# Patient Record
Sex: FEMALE | Race: WHITE
[De-identification: names, ages, dates, MRNs, and addresses within clinical notes are randomized per-mention and may not be internally consistent; named-entity substitution may affect disease eponyms.]

---

## 2017-01-11 ENCOUNTER — HOSPITAL ENCOUNTER (EMERGENCY)
Dept: HOSPITAL 53 - M ED | Age: 27
Discharge: HOME | End: 2017-01-11
Payer: COMMERCIAL

## 2017-01-11 DIAGNOSIS — R05: ICD-10-CM

## 2017-01-11 DIAGNOSIS — Z72.0: ICD-10-CM

## 2017-01-11 DIAGNOSIS — J18.9: ICD-10-CM

## 2017-01-11 DIAGNOSIS — R07.9: Primary | ICD-10-CM

## 2017-01-11 DIAGNOSIS — R20.9: ICD-10-CM

## 2017-01-11 NOTE — REP
Clinical:  Chest pain .

 

Comparison:  None .

 

Findings:

The mediastinum and cardiac silhouette are stable and within normal limits for

portable technique.  The lung fields are clear without acute consolidation,

effusion, or pneumothorax.  Skeletal structures are intact.

 

Impression:

Normal portable chest x-ray

 

 

Signed by

Ramy Charles MD 01/11/2017 08:16 A

## 2017-01-11 NOTE — EDDOCDS
Nurse's Notes                                                                                     

[facility]                                                                         

Name: Cee Randhawa                                                                            

Age: 26 yrs                                                                                       

Sex: Female                                                                                       

: 1990                                                                                   

MRN: [id number]                                                                                     

Arrival Date: 2017                                                                          

Time: 05:24                                                                                       

Account#: [id number]                                                                              

Bed 7                                                                                             

Private MD:                                                                                       

Diagnosis: Acute bronchitis;Pneumonia in diseases classified elsewhere                            

                                                                                                  

Presentation:                                                                                     

                                                                                             

05:35 Presenting complaint: Patient states: throat hurts, chest pains along with coughing,    cjh 

      started about two days ago. Aspirin was not taken prior to arrival. Adult Sepsis            

      Screening: The patient does not have new or worsening altered mentation. Patient's          

      respiratory rate is less than 22. Systolic blood pressure is greater than 100. Patient      

      has a qSOFA score of 0- Negative Sepsis Screen. Suicide/Homicide risk assessment- the       

      patient denies having any suicidal and/or homicidal ideations and does not present with     

      any other emotional, behavioral or mental health complaints.  Status: Patient       

      is not a  or  dependent. Transition of care: patient was not          

      received from another setting of care.                                                      

05:35 Acuity: KIARA Level 4                                                                     [facility] 

05:35 Method Of Arrival: Walkin/Carried/Asstd                                                 [facility] 

                                                                                                  

Triage Assessment:                                                                                

05:36 General: Appears in no apparent distress, comfortable, Behavior is cooperative. Pain:   [facility] 

      Location: head, neck and chest Pain currently is 7 out of 10 on a pain scale. HIV           

      screening NA for this visit Offered previously. Respiratory: Reports cough that is          

      productive. Derm: Skin is pink, warm & dry.                                               

                                                                                                  

OB/GYN:                                                                                           

05:32 LMP 2016                                                                          [facility] 

                                                                                                  

Historical:                                                                                       

- Allergies: no known allergies;                                                                  

- Home Meds:                                                                                      

1. none                                                                                         

- PMHx: none;                                                                                     

- PSHx: tubal pregnancy;                                                                          

- Social history: Smoking status: Patient uses tobacco products, light tobacco smoker.            

No barriers to communication noted.                                                             

- : The pt / caregiver states he / she is not on anticoagulants. Home medication list             

is obtained from the patient.                                                                   

- Exposure Risk Screening:: None identified.                                                      

                                                                                                  

                                                                                                  

Screenin:45 Screening information is obtained from the patient. Fall risk: No risks identified.     kas2

      Assistance ADL's: requires no assistance with activities of daily living. Abuse/DV          

      Screen: The patient / caregiver reports he/she is: not in a situation that causes fear,     

      pain or injury. Nutritional screening: No deficits noted. Advance Directives:               

      Currently, there is no health care proxy. There is no active DNR order. There is no         

      living will. There is no Power of . home support is adequate.                       

                                                                                                  

Assessment:                                                                                       

05:43 General: Appears in no apparent distress, uncomfortable, well nourished, well groomed,  kas2

      Behavior is appropriate for age, cooperative. Pain: Location: chest and neck and head       

      Pain currently is 9 out of 10 on a pain scale. Neurological: Level of Consciousness is      

      awake, alert, Oriented to person, place, time. Cardiovascular: Capillary refill < 3         

      seconds Heart tones S1 S2 present Rhythm is sinus tachycardia No ectopy. Respiratory:       

      Airway is patent Respiratory effort is even, unlabored, Respiratory pattern is regular,     

      symmetrical, Breath sounds are clear bilaterally. Derm: Skin is intact, Skin is dry,        

      Skin is pink, warm & dry. Skin temperature is warm.                                       

06:50 General: Patient laying in bed waiting for respiratory treatment before discharge. No   kas2

      apparent distress noted. Call bell within reach..                                           

                                                                                                  

Vital Signs:                                                                                      

05:32  / 95; Pulse 111; Resp 18; Temp 98.1; Pulse Ox 98% ; Weight 86.18 kg; Height 5    [facility] 

      ft. 4 in. (162.56 cm); Pain 7/10;                                                           

06:54  / 90; Pulse 99; Resp 18; Temp 97.9(O); Pulse Ox 99% on R/A; Pain 5/10;           kas2

07:46  / 86; Pulse 100; Resp 18; Temp 97.8; Pulse Ox 95% on R/A;                        jmk 

05:32 Body Mass Index 32.61 (86.18 kg, 162.56 cm)                                             [facility] 

                                                                                                  

Vitals:                                                                                           

05:32 Log In Time: 2017 at 05:21.                                                 [facility] 

                                                                                                  

ED Course:                                                                                        

05:25 Patient visited by Phyllis Nunez.                                                      gjb 

05:25 Patient moved to Waiting                                                                gjb 

05:36 Triage Initiated                                                                        [facility] 

05:37 Ursula James RN is Primary Nurse.                                                          [facility] 

05:37 Patient moved to 7                                                                      [facility] 

05:40 Patient visited by Ursula James RN.                                                        kas2

05:42 Jannette Gaspar MD is Attending Physician.                                               fg  

05:42 Patient visited by Jannette Gaspar MD.                                                   fg  

05:46 Patient visited by Ursula James RN.                                                        kas2

05:53 Patient visited by Clyde, Oliva, PCA.                                                 ls3 

05:53 EKG done. (by ED staff). Reviewed by Jannette Gaspar MD.                                   ls3 

05:54 Patient visited by Ursula James RN.                                                        kas2

06:09 [facility] Payment Agreement was scanned into [employer] and attached to record.               hs2 

06:37 [facility] is Referral Physician.                               fg  

06:51 Patient visited by Ursula James RN.                                                        kas2

06:55 Patient visited by Ursula James RN.                                                        kas2

07:19 Primary Nurse role handed off by Ursula James RN                                           mcp 

07:46 The patient / caregiver is instructed regarding the plan of care and ED course.         k 

07:46 No IV's were initiated during this patient's visit. No procedures done that require     k 

      assistance.                                                                                 

                                                                                                  

Administered Medications:                                                                         

06:01 Drug: Albuterol-Ipratropium 3 ml [ipratropium-albuterol 0.5 mg-3 mg(2.5 mg base)/3 mL   jh6 

      nebulization soln (3 mL)] Route: Inhalation;                                                

06:54 Drug: Albuterol 2.5 mg [albuterol sulfate 2.5 mg/0.5 mL solution for nebulization (0.5  sd7 

      mL)] Route: Nebulizer;                                                                      

06:58 Follow up: Response: Nebulizer completed                                                sd7 

                                                                                                  

                                                                                                  

RT:                                                                                               

06:01 Initial Med Neb Given as ordered Patient was instructed and evaluated on procedure      jh6 

      Patient tolerated procedure well without adverse effect. Respiratory: Airway is patent      

      Respiratory effort is even, unlabored, Respiratory pattern is regular symmetrical,          

      Breath sounds are coarse in right upper lobe, left upper lobe, right middle lobe, left      

      lower lobe and right lower lobe Breath sounds are diminished in right upper lobe, left      

      upper lobe, right middle lobe, left lower lobe and right lower lobe.                        

06:08 Respiratory: Airway is patent Respiratory effort is even, unlabored, Respiratory        jh6 

      pattern is regular symmetrical, Breath sounds with crackles in right upper lobe, left       

      upper lobe, right middle lobe, left lower lobe and right lower lobe Breath sounds are       

      diminished in right upper lobe, left upper lobe, right middle lobe, left lower lobe and     

      right lower lobe Reports cough that is.                                                     

06:54 Subsequent Med Neb Given as ordered Patient was reinforced on procedure Patient         sd7 

      tolerated procedure well without adverse effect. Respiratory: Breath sounds are coarse      

      bilaterally. Breath sounds with wheezes bilaterally. at inspiration.                        

                                                                                                  

Order Results:                                                                                    

There are currently no results for this order.                                                    

Outcome:                                                                                          

06:37 Discharge ordered by Provider.                                                          fg  

07:46 Discharge Assessment: Patient awake, alert and oriented x 3. No cognitive and/or        jmk 

      functional deficits noted. Patient verbalized understanding of disposition                  

      instructions. patient administered narcotics - no. The following High Risk Discharge        

      criteria are identified: None. Condition: good. Discharge instructions given to             

      patient, Instructed on discharge instructions, follow up and referral plans. medication     

      usage, Demonstrated understanding of instructions, medications, Pt was receptive of         

      discharge instructions/ teaching. Prescriptions given X 3. No special radiology studies     

      were completed. Property :Personal belongings accompany Pt.                                 

07:48 Patient left the ED.                                                                    [facility] 

                                                                                                  

Signatures:                                                                                       

Errol Mccain,RN                           RN   Antonietta Bueno RN                        RN   Lonnie Angel                                 jh6                                                  

Nina Kent,RN                          RN   Terra Del Rio,RT                   RT   sd7                                                  

Jannette Gaspar MD MD fg Schiff, Lauren, PCA                     PCA  ls3                                                  

Phyllis Nunez Hillary, Reg                   Reg  hs2                                                  

Ursula James,RN                            RN   kas2                                                 

                                                                                                  

**************************************************************************************************

MTDD

## 2017-01-11 NOTE — EDDOCDS
Physician Documentation                                                                           

[facility]                                                                         

Name: Cee Randhawa                                                                            

Age: 26 yrs                                                                                       

Sex: Female                                                                                       

: 1990                                                                                   

MRN: [id number]                                                                                     

Arrival Date: 2017                                                                          

Time: 05:24                                                                                       

Account#: [id number]                                                                              

Bed 7                                                                                             

Private MD:                                                                                       

Disposition:                                                                                      

17 06:37 Discharged to Home/Self Care. Impression: Acute bronchitis, Pneumonia in           

diseases classified elsewhere.                                                                  

- Condition is Stable.                                                                            

- Discharge Instructions: Acute Bronchitis, Pneumonia, Adult, Easy-to-Read.                       

- Prescriptions for Zithromax Z- Jac 250 mg Oral Tablet - take 1 tablet by ORAL route             

as directed for 5 days Day 1- take two tablets once. Day 2, 3, 4 , 5 take one tablet            

once daily.; 6 tablet. Prednisone 20 mg Oral Tablet - take 2 tablet by ORAL route               

once daily for 5 days; 10 tablet. benzonatate 200 mg Oral Capsule - take 1 capsule by           

ORAL route 2 times per day As needed; 15 capsule.                                               

- Medication Reconciliation, Local Pharmacy Hours form.                                           

- [facility]; When: Call to arrange an appointment;            

Reason: Continuance of care.                                                                    

- Problem is new.                                                                                 

- Symptoms have worsened.                                                                         

                                                                                                  

                                                                                                  

                                                                                                  

Historical:                                                                                       

- Allergies: no known allergies;                                                                  

- Home Meds:                                                                                      

1. none                                                                                         

- PMHx: none;                                                                                     

- PSHx: tubal pregnancy;                                                                          

- Social history: Smoking status: Patient uses tobacco products, light tobacco smoker.            

No barriers to communication noted.                                                             

- : The pt / caregiver states he / she is not on anticoagulants. Home medication list             

is obtained from the patient.                                                                   

- Exposure Risk Screening:: None identified.                                                      

                                                                                                  

                                                                                                  

OB/GYN:                                                                                           

                                                                                             

05:32 LMP 2016                                                                          [facility] 

                                                                                                  

Vital Signs:                                                                                      

05:32  / 95; Pulse 111; Resp 18; Temp 98.1; Pulse Ox 98% ; Weight 86.18 kg / 189.99     [facility] 

      lbs; Height 5 ft. 4 in. (162.56 cm); Pain 7/10;                                             

06:54  / 90; Pulse 99; Resp 18; Temp 97.9(O); Pulse Ox 99% on R/A; Pain 5/10;           kas2

07:46  / 86; Pulse 100; Resp 18; Temp 97.8; Pulse Ox 95% on R/A;                        k 

05:32 Body Mass Index 32.61 (86.18 kg, 162.56 cm)                                             [facility] 

                                                                                                  

MDM:                                                                                              

05:42 ECG WITH READING ER PHYS+CARDIAG ordered.                                               EDMS

05:48 Albuterol-Ipratropium 3 ml Inhalation once ordered.                                     fg  

05:48 Call Respiratory ordered.                                                               fg  

05:48 Strep Screen, Nursing ordered.                                                          fg  

05:49 Call Respiratory complete.                                                              kas2

05:50 Chest, 1 View Ordered.                                                                  EDMS

06:09 Financial registration complete.                                                        hs2 

06:09 [facility] Payment Agreement was scanned into [employer] and attached to record.               hs2 

06:36 Albuterol 2.5 mg Nebulizer once ordered.                                                fg  

                                                                                                  

Administered Medications:                                                                         

06:01 Drug: Albuterol-Ipratropium 3 ml [ipratropium-albuterol 0.5 mg-3 mg(2.5 mg base)/3 mL   [facility] 

      nebulization soln (3 mL)] Route: Inhalation;                                                

06:54 Drug: Albuterol 2.5 mg [albuterol sulfate 2.5 mg/0.5 mL solution for nebulization (0.5  sd7 

      mL)] Route: Nebulizer;                                                                      

06:58 Follow up: Response: Nebulizer completed                                                sd7 

                                                                                                  

                                                                                                  

Signatures:                                                                                       

Dispatcher [employer]                           EDMS                                                 

Errol Mccain,RN                           RN   Nina Vyas,RN                          RN   [facility]                                                  

Jannette Gaspar MD MD                                                      

Iram Patel, Reg                   Reg  hs2                                                  

Ursula James,RN                            RN   marry                                                 

Lonnie Arteaga                                jh6                                                  

Terra Mehta                     RT   sd7                                                  

                                                                                                  

The chart was reviewed and I authenticate all verbal orders and agree with the evaluation and 
treatment provided.Attachments:

06:09 [facility] Payment Agreement                                                                hs2 

                                                                                                  

**************************************************************************************************

MTDD

## 2017-01-11 NOTE — ECGEPIP
Stationary ECG Study

                           [facility] - ED

                                       

                                       Test Date:    2017

Pat Name:     ISAIAH RUSSELL         Department:   

Patient ID:   [id number]                 Room:         -

Gender:       F                        Technician:   

:          1990               Requested By: ABHIJIT MANCERA

Order Number: [id number]     Reading MD:   Keiko Ferrer

                                 Measurements

Intervals                              Axis          

Rate:         102                      P:            27

IN:           114                      QRS:          54

QRSD:         76                       T:            16

QT:           313                                    

QTc:          409                                    

                           Interpretive Statements

SINUS TACHYCARDIA WITH SHORT IN INTERVAL

ABNORMAL RHYTHM ECG

NO PRIOR FOR COMPARISON

Electronically Signed On 2017 18:05:26 EST by Keiko Ferrer

## 2017-01-13 NOTE — EDDOCDS
Physician Documentation                                                                           

[facility]                                                                         

Name: Cee Randhawa                                                                            

Age: 26 yrs                                                                                       

Sex: Female                                                                                       

: 1990                                                                                   

MRN: [id number]                                                                                     

Arrival Date: 2017                                                                          

Time: 05:24                                                                                       

Account#: [id number]                                                                              

Bed 7                                                                                             

Private MD:                                                                                       

Disposition:                                                                                      

17 06:37 Discharged to Home/Self Care. Impression: Acute bronchitis, Pneumonia in           

diseases classified elsewhere.                                                                  

- Condition is Stable.                                                                            

- Discharge Instructions: Acute Bronchitis, Pneumonia, Adult, Easy-to-Read.                       

- Prescriptions for Zithromax Z- Jac 250 mg Oral Tablet - take 1 tablet by ORAL route             

as directed for 5 days Day 1- take two tablets once. Day 2, 3, 4 , 5 take one tablet            

once daily.; 6 tablet. Prednisone 20 mg Oral Tablet - take 2 tablet by ORAL route               

once daily for 5 days; 10 tablet. benzonatate 200 mg Oral Capsule - take 1 capsule by           

ORAL route 2 times per day As needed; 15 capsule.                                               

- Medication Reconciliation, Local Pharmacy Hours form.                                           

- [facility]; When: Call to arrange an appointment;            

Reason: Continuance of care.                                                                    

- Problem is new.                                                                                 

- Symptoms have worsened.                                                                         

                                                                                                  

                                                                                                  

                                                                                                  

Historical:                                                                                       

- Allergies: no known allergies;                                                                  

- Home Meds:                                                                                      

1. none                                                                                         

- PMHx: none;                                                                                     

- PSHx: tubal pregnancy;                                                                          

- Social history: Smoking status: Patient uses tobacco products, light tobacco smoker.            

No barriers to communication noted.                                                             

- : The pt / caregiver states he / she is not on anticoagulants. Home medication list             

is obtained from the patient.                                                                   

- Exposure Risk Screening:: None identified.                                                      

                                                                                                  

                                                                                                  

OB/GYN:                                                                                           

                                                                                             

05:32 LMP 2016                                                                          [facility] 

                                                                                                  

Vital Signs:                                                                                      

05:32  / 95; Pulse 111; Resp 18; Temp 98.1; Pulse Ox 98% ; Weight 86.18 kg / 189.99     [facility] 

      lbs; Height 5 ft. 4 in. (162.56 cm); Pain 7/10;                                             

06:54  / 90; Pulse 99; Resp 18; Temp 97.9(O); Pulse Ox 99% on R/A; Pain 5/10;           kas2

07:46  / 86; Pulse 100; Resp 18; Temp 97.8; Pulse Ox 95% on R/A;                        k 

05:32 Body Mass Index 32.61 (86.18 kg, 162.56 cm)                                             [facility] 

                                                                                                  

MDM:                                                                                              

05:42 ECG WITH READING ER PHYS+CARDIAG ordered.                                               EDMS

05:48 Albuterol-Ipratropium 3 ml Inhalation once ordered.                                     fg  

05:48 Call Respiratory ordered.                                                               fg  

05:48 Strep Screen, Nursing ordered.                                                          fg  

05:49 Call Respiratory complete.                                                              kas2

05:50 Chest, 1 View Ordered.                                                                  EDMS

06:09 Financial registration complete.                                                        hs2 

06:09 [facility] Payment Agreement was scanned into [employer] and attached to record.               hs2 

06:36 Albuterol 2.5 mg Nebulizer once ordered.                                                fg  

13:58 T-Sheet-- Draft Copy was scanned into [employer] and attached to record.                   gb  

13:58 ECG/EKG was scanned into [employer] and attached to record.                                gb  

                                                                                                  

Administered Medications:                                                                         

06:01 Drug: Albuterol-Ipratropium 3 ml [ipratropium-albuterol 0.5 mg-3 mg(2.5 mg base)/3 mL   [facility] 

      nebulization soln (3 mL)] Route: Inhalation;                                                

06:54 Drug: Albuterol 2.5 mg [albuterol sulfate 2.5 mg/0.5 mL solution for nebulization (0.5  sd7 

      mL)] Route: Nebulizer;                                                                      

06:58 Follow up: Response: Nebulizer completed                                                sd7 

                                                                                                  

                                                                                                  

Signatures:                                                                                       

Dispatcher [employer]                           EDMS                                                 

Errol Mccain,RN                           RN   Stephanie Garza, Reg                  Reg  gb                                                   

Nina Kent,RN                          RN   [facility]                                                  

Jannette Gaspar MD MD                                                      

Iram Patel, Reg                   Reg  hs2                                                  

Ursula James RN                            RN   [facility]                                                 

Lonnie Arteaga                                jh                                                  

Terra Mehta                     RT   sd7                                                  

                                                                                                  

The chart was reviewed and I authenticate all verbal orders and agree with the evaluation and 
treatment provided.Attachments:

06:09 [facility] Payment Agreement                                                                hs2 

13:58 T-Sheet-- Draft Copy                                                                    gb  

13:58 ECG/EKG                                                                                 gb  

                                                                                                  

**************************************************************************************************



*** Chart Complete ***
MTDD

## 2017-01-13 NOTE — EDDOCDS
Nurse's Notes                                                                                     

[facility]                                                                         

Name: Cee Randhawa                                                                            

Age: 26 yrs                                                                                       

Sex: Female                                                                                       

: 1990                                                                                   

MRN: [id number]                                                                                     

Arrival Date: 2017                                                                          

Time: 05:24                                                                                       

Account#: [id number]                                                                              

Bed 7                                                                                             

Private MD:                                                                                       

Diagnosis: Acute bronchitis;Pneumonia in diseases classified elsewhere                            

                                                                                                  

Presentation:                                                                                     

                                                                                             

05:35 Presenting complaint: Patient states: throat hurts, chest pains along with coughing,    cjh 

      started about two days ago. Aspirin was not taken prior to arrival. Adult Sepsis            

      Screening: The patient does not have new or worsening altered mentation. Patient's          

      respiratory rate is less than 22. Systolic blood pressure is greater than 100. Patient      

      has a qSOFA score of 0- Negative Sepsis Screen. Suicide/Homicide risk assessment- the       

      patient denies having any suicidal and/or homicidal ideations and does not present with     

      any other emotional, behavioral or mental health complaints.  Status: Patient       

      is not a  or  dependent. Transition of care: patient was not          

      received from another setting of care.                                                      

05:35 Acuity: KIARA Level 4                                                                     [facility] 

05:35 Method Of Arrival: Walkin/Carried/Asstd                                                 [facility] 

                                                                                                  

Triage Assessment:                                                                                

05:36 General: Appears in no apparent distress, comfortable, Behavior is cooperative. Pain:   [facility] 

      Location: head, neck and chest Pain currently is 7 out of 10 on a pain scale. HIV           

      screening NA for this visit Offered previously. Respiratory: Reports cough that is          

      productive. Derm: Skin is pink, warm & dry.                                               

                                                                                                  

OB/GYN:                                                                                           

05:32 LMP 2016                                                                          [facility] 

                                                                                                  

Historical:                                                                                       

- Allergies: no known allergies;                                                                  

- Home Meds:                                                                                      

1. none                                                                                         

- PMHx: none;                                                                                     

- PSHx: tubal pregnancy;                                                                          

- Social history: Smoking status: Patient uses tobacco products, light tobacco smoker.            

No barriers to communication noted.                                                             

- : The pt / caregiver states he / she is not on anticoagulants. Home medication list             

is obtained from the patient.                                                                   

- Exposure Risk Screening:: None identified.                                                      

                                                                                                  

                                                                                                  

Screenin:45 Screening information is obtained from the patient. Fall risk: No risks identified.     kas2

      Assistance ADL's: requires no assistance with activities of daily living. Abuse/DV          

      Screen: The patient / caregiver reports he/she is: not in a situation that causes fear,     

      pain or injury. Nutritional screening: No deficits noted. Advance Directives:               

      Currently, there is no health care proxy. There is no active DNR order. There is no         

      living will. There is no Power of . home support is adequate.                       

                                                                                                  

Assessment:                                                                                       

05:43 General: Appears in no apparent distress, uncomfortable, well nourished, well groomed,  kas2

      Behavior is appropriate for age, cooperative. Pain: Location: chest and neck and head       

      Pain currently is 9 out of 10 on a pain scale. Neurological: Level of Consciousness is      

      awake, alert, Oriented to person, place, time. Cardiovascular: Capillary refill < 3         

      seconds Heart tones S1 S2 present Rhythm is sinus tachycardia No ectopy. Respiratory:       

      Airway is patent Respiratory effort is even, unlabored, Respiratory pattern is regular,     

      symmetrical, Breath sounds are clear bilaterally. Derm: Skin is intact, Skin is dry,        

      Skin is pink, warm & dry. Skin temperature is warm.                                       

06:50 General: Patient laying in bed waiting for respiratory treatment before discharge. No   kas2

      apparent distress noted. Call bell within reach..                                           

                                                                                                  

Vital Signs:                                                                                      

05:32  / 95; Pulse 111; Resp 18; Temp 98.1; Pulse Ox 98% ; Weight 86.18 kg; Height 5    [facility] 

      ft. 4 in. (162.56 cm); Pain 7/10;                                                           

06:54  / 90; Pulse 99; Resp 18; Temp 97.9(O); Pulse Ox 99% on R/A; Pain 5/10;           kas2

07:46  / 86; Pulse 100; Resp 18; Temp 97.8; Pulse Ox 95% on R/A;                        jmk 

05:32 Body Mass Index 32.61 (86.18 kg, 162.56 cm)                                             [facility] 

                                                                                                  

Vitals:                                                                                           

05:32 Log In Time: 2017 at 05:21.                                                 [facility] 

                                                                                                  

ED Course:                                                                                        

05:25 Patient visited by Phyllis Nunez.                                                      gjb 

05:25 Patient moved to Waiting                                                                gjb 

05:36 Triage Initiated                                                                        [facility] 

05:37 Ursula James RN is Primary Nurse.                                                          [facility] 

05:37 Patient moved to 7                                                                      [facility] 

05:40 Patient visited by Ursula James RN.                                                        kas2

05:42 Jannette Gaspar MD is Attending Physician.                                               fg  

05:42 Patient visited by Jannette Gaspar MD.                                                   fg  

05:46 Patient visited by Ursula James RN.                                                        kas2

05:53 Patient visited by Clyde, Oliva, PCA.                                                 ls3 

05:53 EKG done. (by ED staff). Reviewed by Jannette Gaspar MD.                                   ls3 

05:54 Patient visited by Ursula James RN.                                                        kas2

06:09 [facility] Payment Agreement was scanned into [employer] and attached to record.               hs2 

06:37 [facility] is Referral Physician.                               fg  

06:51 Patient visited by Ursula James RN.                                                        kas2

06:55 Patient visited by Ursula James RN.                                                        kas2

07:19 Primary Nurse role handed off by Ursula James RN                                           mcp 

07:46 The patient / caregiver is instructed regarding the plan of care and ED course.         jmk 

07:46 No IV's were initiated during this patient's visit. No procedures done that require     k 

      assistance.                                                                                 

08:50 Chest, 1 View Returned.                                                                 EDMS

13:58 T-Sheet-- Draft Copy was scanned into [employer] and attached to record.                   gb  

13:58 ECG/EKG was scanned into [employer] and attached to record.                                gb  

18:09 EKG-ADULT Returned.                                                                     EDMS

                                                                                                  

Administered Medications:                                                                         

06:01 Drug: Albuterol-Ipratropium 3 ml [ipratropium-albuterol 0.5 mg-3 mg(2.5 mg base)/3 mL   jh6 

      nebulization soln (3 mL)] Route: Inhalation;                                                

06:54 Drug: Albuterol 2.5 mg [albuterol sulfate 2.5 mg/0.5 mL solution for nebulization (0.5  sd7 

      mL)] Route: Nebulizer;                                                                      

06:58 Follow up: Response: Nebulizer completed                                                sd7 

                                                                                                  

                                                                                                  

RT:                                                                                               

06:01 Initial Med Neb Given as ordered Patient was instructed and evaluated on procedure      jh6 

      Patient tolerated procedure well without adverse effect. Respiratory: Airway is patent      

      Respiratory effort is even, unlabored, Respiratory pattern is regular symmetrical,          

      Breath sounds are coarse in right upper lobe, left upper lobe, right middle lobe, left      

      lower lobe and right lower lobe Breath sounds are diminished in right upper lobe, left      

      upper lobe, right middle lobe, left lower lobe and right lower lobe.                        

06:08 Respiratory: Airway is patent Respiratory effort is even, unlabored, Respiratory        jh6 

      pattern is regular symmetrical, Breath sounds with crackles in right upper lobe, left       

      upper lobe, right middle lobe, left lower lobe and right lower lobe Breath sounds are       

      diminished in right upper lobe, left upper lobe, right middle lobe, left lower lobe and     

      right lower lobe Reports cough that is.                                                     

06:54 Subsequent Med Neb Given as ordered Patient was reinforced on procedure Patient         sd7 

      tolerated procedure well without adverse effect. Respiratory: Breath sounds are coarse      

      bilaterally. Breath sounds with wheezes bilaterally. at inspiration.                        

                                                                                                  

Order Results:                                                                                    

                                                                                                  

Radiology Order: EKG-ADULT                                                                        

      Test: EKG-ADULT                                                                             

      REASON FOR EXAMINATION: Chest Pain; Stationary ECG Study; [facility] - ED; ;        

      Test Date: 2017; Pat Name: CEE RANDHAWA Department:; Patient ID: [id number]         

      Room: -; Gender: F Technician:; : 1990 Requested By: JANNETTE MANCERA; Order        

      Number: [id number] Reading MD: Keiko Ferrer; Measurements; Intervals     

      Axis; Rate: 102 P: 27; MA: 114 QRS: 54; QRSD: 76 T: 16; QT: 313; QTc: 409; Interpretive     

      Statements; SINUS TACHYCARDIA WITH SHORT MA INTERVAL; ABNORMAL RHYTHM ECG; NO PRIOR FOR     

      COMPARISON; Electronically Signed On 2017 18:05:26 EST by Keiko Ferrer;       

Radiology Order: Chest, 1 View                                                                    

      Test: Chest, 1 View                                                                         

      REASON FOR EXAMINATION: Chest Pain; Clinical: Chest pain .; ; Comparison: None .; ;         

      Findings:; The mediastinum and cardiac silhouette are stable and within normal limits       

      for; portable technique. The lung fields are clear without acute consolidation,;            

      effusion, or pneumothorax. Skeletal structures are intact.; ; Impression:; Normal           

      portable chest x-ray; ; ; Signed by; Ramy Charles MD 2017 08:16 A;                  

Outcome:                                                                                          

06:37 Discharge ordered by Provider.                                                          fg  

07:46 Discharge Assessment: Patient awake, alert and oriented x 3. No cognitive and/or        jmk 

      functional deficits noted. Patient verbalized understanding of disposition                  

      instructions. patient administered narcotics - no. The following High Risk Discharge        

      criteria are identified: None. Condition: good. Discharge instructions given to             

      patient, Instructed on discharge instructions, follow up and referral plans. medication     

      usage, Demonstrated understanding of instructions, medications, Pt was receptive of         

      discharge instructions/ teaching. Prescriptions given X 3. No special radiology studies     

      were completed. Property :Personal belongings accompany Pt.                                 

07:48 Patient left the ED.                                                                    k 

                                                                                                  

Signatures:                                                                                       

Dispatcher [employer]                           EDMS                                                 

Errol Mccain,RN                           RN   maddie Trevino, Antonietta, RN                        RN   [facility]                                                  

Stephanie Frankel, Reg                  Reg  gb                                                   

Lonnie Arteaga                                 jh6                                                  

Nina Kent,RN                          RN   [facility]                                                  

Janine,Terra,RT                   RT   sd7                                                  

Jannette Gaspar MD MD fg Schiff, Lauren, PCA                     PCA  ls3                                                  

Phyllis Nunez                               gjb                                                  

Iram Patel, Reg                   Reg  hs2                                                  

Ursula James,RN                            RN   kas2                                                 

                                                                                                  

**************************************************************************************************



*** Chart Complete ***
MTDD

## 2017-01-13 NOTE — EDDOCDS
Physician Documentation                                                                           

[facility]                                                                         

Name: Cee Randhawa                                                                            

Age: 26 yrs                                                                                       

Sex: Female                                                                                       

: 1990                                                                                   

MRN: [id number]                                                                                     

Arrival Date: 2017                                                                          

Time: 05:24                                                                                       

Account#: [id number]                                                                              

Bed 7                                                                                             

Private MD:                                                                                       

Disposition:                                                                                      

17 06:37 Discharged to Home/Self Care. Impression: Acute bronchitis, Pneumonia in           

diseases classified elsewhere.                                                                  

- Condition is Stable.                                                                            

- Discharge Instructions: Acute Bronchitis, Pneumonia, Adult, Easy-to-Read.                       

- Prescriptions for Zithromax Z- Jac 250 mg Oral Tablet - take 1 tablet by ORAL route             

as directed for 5 days Day 1- take two tablets once. Day 2, 3, 4 , 5 take one tablet            

once daily.; 6 tablet. Prednisone 20 mg Oral Tablet - take 2 tablet by ORAL route               

once daily for 5 days; 10 tablet. benzonatate 200 mg Oral Capsule - take 1 capsule by           

ORAL route 2 times per day As needed; 15 capsule.                                               

- Medication Reconciliation, Local Pharmacy Hours form.                                           

- [facility]; When: Call to arrange an appointment;            

Reason: Continuance of care.                                                                    

- Problem is new.                                                                                 

- Symptoms have worsened.                                                                         

                                                                                                  

                                                                                                  

                                                                                                  

Historical:                                                                                       

- Allergies: no known allergies;                                                                  

- Home Meds:                                                                                      

1. none                                                                                         

- PMHx: none;                                                                                     

- PSHx: tubal pregnancy;                                                                          

- Social history: Smoking status: Patient uses tobacco products, light tobacco smoker.            

No barriers to communication noted.                                                             

- : The pt / caregiver states he / she is not on anticoagulants. Home medication list             

is obtained from the patient.                                                                   

- Exposure Risk Screening:: None identified.                                                      

                                                                                                  

                                                                                                  

OB/GYN:                                                                                           

                                                                                             

05:32 LMP 2016                                                                          [facility] 

                                                                                                  

Vital Signs:                                                                                      

05:32  / 95; Pulse 111; Resp 18; Temp 98.1; Pulse Ox 98% ; Weight 86.18 kg / 189.99     [facility] 

      lbs; Height 5 ft. 4 in. (162.56 cm); Pain 7/10;                                             

06:54  / 90; Pulse 99; Resp 18; Temp 97.9(O); Pulse Ox 99% on R/A; Pain 5/10;           kas2

07:46  / 86; Pulse 100; Resp 18; Temp 97.8; Pulse Ox 95% on R/A;                        k 

05:32 Body Mass Index 32.61 (86.18 kg, 162.56 cm)                                             [facility] 

                                                                                                  

MDM:                                                                                              

05:42 ECG WITH READING ER PHYS+CARDIAG ordered.                                               EDMS

05:48 Albuterol-Ipratropium 3 ml Inhalation once ordered.                                     fg  

05:48 Call Respiratory ordered.                                                               fg  

05:48 Strep Screen, Nursing ordered.                                                          fg  

05:49 Call Respiratory complete.                                                              kas2

05:50 Chest, 1 View Ordered.                                                                  EDMS

06:09 Financial registration complete.                                                        hs2 

06:09 [facility] Payment Agreement was scanned into [employer] and attached to record.               hs2 

06:36 Albuterol 2.5 mg Nebulizer once ordered.                                                fg  

13:58 T-Sheet-- Draft Copy was scanned into [employer] and attached to record.                   gb  

13:58 ECG/EKG was scanned into [employer] and attached to record.                                gb  

                                                                                                  

Administered Medications:                                                                         

06:01 Drug: Albuterol-Ipratropium 3 ml [ipratropium-albuterol 0.5 mg-3 mg(2.5 mg base)/3 mL   [facility] 

      nebulization soln (3 mL)] Route: Inhalation;                                                

06:54 Drug: Albuterol 2.5 mg [albuterol sulfate 2.5 mg/0.5 mL solution for nebulization (0.5  sd7 

      mL)] Route: Nebulizer;                                                                      

06:58 Follow up: Response: Nebulizer completed                                                sd7 

                                                                                                  

                                                                                                  

Signatures:                                                                                       

Dispatcher [employer]                           EDMS                                                 

Errol Mccain,RN                           RN   Stephanie Garza, Reg                  Reg  gb                                                   

Nina Kent,RN                          RN   [facility]                                                  

Jannette Gaspar MD MD                                                      

Iram Patel, Reg                   Reg  hs2                                                  

Ursula James RN                            RN   [facility]                                                 

Lonnie Arteaga                                jh                                                  

Terra Mehta                     RT   sd7                                                  

                                                                                                  

The chart was reviewed and I authenticate all verbal orders and agree with the evaluation and 
treatment provided.Attachments:

06:09 [facility] Payment Agreement                                                                hs2 

13:58 T-Sheet-- Draft Copy                                                                    gb  

13:58 ECG/EKG                                                                                 gb  

                                                                                                  

**************************************************************************************************



*** Chart Complete ***
MTDD

## 2017-05-07 ENCOUNTER — HOSPITAL ENCOUNTER (EMERGENCY)
Dept: HOSPITAL 53 - M ED | Age: 27
Discharge: HOME | End: 2017-05-07
Payer: COMMERCIAL

## 2017-05-07 VITALS — SYSTOLIC BLOOD PRESSURE: 116 MMHG | DIASTOLIC BLOOD PRESSURE: 74 MMHG

## 2017-05-07 VITALS — WEIGHT: 196 LBS | HEIGHT: 64 IN | BODY MASS INDEX: 33.46 KG/M2

## 2017-05-07 DIAGNOSIS — R11.2: Primary | ICD-10-CM

## 2017-06-04 ENCOUNTER — HOSPITAL ENCOUNTER (EMERGENCY)
Dept: HOSPITAL 53 - M ED | Age: 27
Discharge: HOME | End: 2017-06-04
Payer: COMMERCIAL

## 2017-06-04 VITALS — BODY MASS INDEX: 37.56 KG/M2 | HEIGHT: 64 IN | WEIGHT: 220 LBS

## 2017-06-04 VITALS — SYSTOLIC BLOOD PRESSURE: 122 MMHG | DIASTOLIC BLOOD PRESSURE: 58 MMHG

## 2017-06-04 DIAGNOSIS — B37.2: ICD-10-CM

## 2017-06-04 DIAGNOSIS — N61.1: Primary | ICD-10-CM

## 2017-06-04 LAB
ANION GAP SERPL CALC-SCNC: 4 MEQ/L (ref 8–16)
BASOPHILS # BLD AUTO: 0 K/MM3 (ref 0–0.2)
BASOPHILS NFR BLD AUTO: 0.4 % (ref 0–1)
BUN SERPL-MCNC: 9 MG/DL (ref 7–18)
CALCIUM SERPL-MCNC: 9 MG/DL (ref 8.5–10.1)
CHLORIDE SERPL-SCNC: 109 MEQ/L (ref 98–107)
CO2 SERPL-SCNC: 27 MEQ/L (ref 21–32)
CREAT SERPL-MCNC: 0.6 MG/DL (ref 0.55–1.02)
EOSINOPHIL # BLD AUTO: 0.4 K/MM3 (ref 0–0.5)
EOSINOPHIL NFR BLD AUTO: 4 % (ref 0–3)
ERYTHROCYTE [DISTWIDTH] IN BLOOD BY AUTOMATED COUNT: 14.1 % (ref 11.5–14.5)
GFR SERPL CREATININE-BSD FRML MDRD: > 60 ML/MIN/{1.73_M2} (ref 60–?)
GLUCOSE SERPL-MCNC: 85 MG/DL (ref 70–105)
LARGE UNSTAINED CELL #: 0.1 K/MM3 (ref 0–0.4)
LARGE UNSTAINED CELL %: 1.2 % (ref 0–4)
LYMPHOCYTES # BLD AUTO: 2.4 K/MM3 (ref 1.5–6.5)
LYMPHOCYTES NFR BLD AUTO: 22 % (ref 24–44)
MCH RBC QN AUTO: 28.3 PG (ref 27–33)
MCHC RBC AUTO-ENTMCNC: 33.1 G/DL (ref 32–36.5)
MCV RBC AUTO: 85.3 FL (ref 80–96)
MONOCYTES # BLD AUTO: 0.3 K/MM3 (ref 0–0.8)
MONOCYTES NFR BLD AUTO: 3 % (ref 0–5)
NEUTROPHILS # BLD AUTO: 7.2 K/MM3 (ref 1.8–7.7)
NEUTROPHILS NFR BLD AUTO: 69.4 % (ref 36–66)
PLATELET # BLD AUTO: 254 K/MM3 (ref 150–450)
POTASSIUM SERPL-SCNC: 4 MEQ/L (ref 3.5–5.1)
SODIUM SERPL-SCNC: 140 MEQ/L (ref 136–145)
WBC # BLD AUTO: 10.4 K/MM3 (ref 4–10)

## 2017-06-04 NOTE — REPUSA
Clinical history: Breast redness, swelling.

Findings: Real-time ultrasound imaging of the right breast was performed.. There is an open draining 
wound  at the two to three o'clock position of the right breast. Adjacent to the site, posterior to t
he nipple, is a complex fluid collection measuring 2.3 x 1.7 x 1.4 cm. Diffuse edema is noted. No oth
er gross abnormalities.

Impression: Complex Fluid collection in the upper inner right breast, consistent with an abscess.

BIRAD 2: benign findings.

     Electronically signed by STEFANY SU MD on 06/04/2017 08:48:09 PM ET

## 2017-08-12 ENCOUNTER — HOSPITAL ENCOUNTER (EMERGENCY)
Dept: HOSPITAL 53 - M ED | Age: 27
LOS: 1 days | Discharge: HOME | End: 2017-08-13
Payer: COMMERCIAL

## 2017-08-12 VITALS — HEIGHT: 64 IN | BODY MASS INDEX: 36.13 KG/M2 | WEIGHT: 211.64 LBS

## 2017-08-12 VITALS — SYSTOLIC BLOOD PRESSURE: 122 MMHG | DIASTOLIC BLOOD PRESSURE: 68 MMHG

## 2017-08-12 DIAGNOSIS — Z72.0: ICD-10-CM

## 2017-08-12 DIAGNOSIS — R10.9: Primary | ICD-10-CM

## 2017-08-12 LAB
ALBUMIN SERPL BCG-MCNC: 3.3 GM/DL (ref 3.2–5.2)
ALBUMIN/GLOB SERPL: 0.87 {RATIO} (ref 1–1.93)
ALP SERPL-CCNC: 90 U/L (ref 45–117)
ALT SERPL W P-5'-P-CCNC: 17 U/L (ref 12–78)
ANION GAP SERPL CALC-SCNC: 9 MEQ/L (ref 8–16)
AST SERPL-CCNC: 13 U/L (ref 15–37)
BASOPHILS # BLD AUTO: 0.1 K/MM3 (ref 0–0.2)
BASOPHILS NFR BLD AUTO: 0.8 % (ref 0–1)
BILIRUB CONJ SERPL-MCNC: < 0.1 MG/DL (ref 0–0.2)
BILIRUB SERPL-MCNC: 0.1 MG/DL (ref 0.2–1)
BUN SERPL-MCNC: 7 MG/DL (ref 7–18)
CALCIUM SERPL-MCNC: 8.7 MG/DL (ref 8.5–10.1)
CHLORIDE SERPL-SCNC: 108 MEQ/L (ref 98–107)
CO2 SERPL-SCNC: 24 MEQ/L (ref 21–32)
CONTROL LINE HCG: (no result)
CONTROL LINE UCG: (no result)
CREAT SERPL-MCNC: 0.62 MG/DL (ref 0.55–1.02)
EOSINOPHIL # BLD AUTO: 0.4 K/MM3 (ref 0–0.5)
EOSINOPHIL NFR BLD AUTO: 3.8 % (ref 0–3)
ERYTHROCYTE [DISTWIDTH] IN BLOOD BY AUTOMATED COUNT: 12.8 % (ref 11.5–14.5)
GFR SERPL CREATININE-BSD FRML MDRD: > 60 ML/MIN/{1.73_M2} (ref 60–?)
GLUCOSE SERPL-MCNC: 114 MG/DL (ref 70–105)
LARGE UNSTAINED CELL #: 0.2 K/MM3 (ref 0–0.4)
LARGE UNSTAINED CELL %: 1.5 % (ref 0–4)
LYMPHOCYTES # BLD AUTO: 2.5 K/MM3 (ref 1.5–6.5)
LYMPHOCYTES NFR BLD AUTO: 21 % (ref 24–44)
MCH RBC QN AUTO: 28.9 PG (ref 27–33)
MCHC RBC AUTO-ENTMCNC: 34.1 G/DL (ref 32–36.5)
MCV RBC AUTO: 84.8 FL (ref 80–96)
MONOCYTES # BLD AUTO: 0.4 K/MM3 (ref 0–0.8)
MONOCYTES NFR BLD AUTO: 3.6 % (ref 0–5)
NEUTROPHILS # BLD AUTO: 8.2 K/MM3 (ref 1.8–7.7)
NEUTROPHILS NFR BLD AUTO: 69.4 % (ref 36–66)
PLATELET # BLD AUTO: 293 K/MM3 (ref 150–450)
POTASSIUM SERPL-SCNC: 3.9 MEQ/L (ref 3.5–5.1)
PROT SERPL-MCNC: 7.1 GM/DL (ref 6.4–8.2)
SODIUM SERPL-SCNC: 141 MEQ/L (ref 136–145)
WBC # BLD AUTO: 11.9 K/MM3 (ref 4–10)

## 2017-08-12 PROCEDURE — 87210 SMEAR WET MOUNT SALINE/INK: CPT

## 2017-08-12 PROCEDURE — 74177 CT ABD & PELVIS W/CONTRAST: CPT

## 2017-08-12 PROCEDURE — 80048 BASIC METABOLIC PNL TOTAL CA: CPT

## 2017-08-12 PROCEDURE — 96375 TX/PRO/DX INJ NEW DRUG ADDON: CPT

## 2017-08-12 PROCEDURE — 81001 URINALYSIS AUTO W/SCOPE: CPT

## 2017-08-12 PROCEDURE — 80076 HEPATIC FUNCTION PANEL: CPT

## 2017-08-12 PROCEDURE — 36415 COLL VENOUS BLD VENIPUNCTURE: CPT

## 2017-08-12 PROCEDURE — 87491 CHLMYD TRACH DNA AMP PROBE: CPT

## 2017-08-12 PROCEDURE — 96374 THER/PROPH/DIAG INJ IV PUSH: CPT

## 2017-08-12 PROCEDURE — 83690 ASSAY OF LIPASE: CPT

## 2017-08-12 PROCEDURE — 99284 EMERGENCY DEPT VISIT MOD MDM: CPT

## 2017-08-12 PROCEDURE — 84703 CHORIONIC GONADOTROPIN ASSAY: CPT

## 2017-08-12 PROCEDURE — 87591 N.GONORRHOEAE DNA AMP PROB: CPT

## 2017-08-12 PROCEDURE — 85025 COMPLETE CBC W/AUTO DIFF WBC: CPT

## 2017-08-12 PROCEDURE — 96376 TX/PRO/DX INJ SAME DRUG ADON: CPT

## 2017-08-12 PROCEDURE — 93000 ELECTROCARDIOGRAM COMPLETE: CPT

## 2017-08-12 NOTE — REPUSA
CLINICAL HISTORY: Right lower quadrant pain.

TECHNIQUE: CT abdomen and pelvis following administration of IV contrast. Total .9 mGy*cm.

COMPARISON: No pertinent prior studies are available at this time.

 

CT ABDOMEN WITH CONTRAST:

Lung bases: No lung base infiltrate or effusion. 4 mm bilateral lower lobe nodules likely due to atel
ectasis.

Liver: No intrahepatic ductal dilation.

Gallbladder: Contracted.

Pancreas: No pancreatic duct dilation.

Bowel loops: Nondistended.

Spleen: Normal size.

Adrenals: Normal size.

Kidneys: No hydronephrosis.

Aorta: Normal caliber.

Peritoneum: No free air.

 

CT PELVIS WITH CONTRAST:

Colon: Nondistended.

Bladder: Normally distended.

Pelvic organs: Unremarkable.

Peritoneum: No fluid.

Skeleton: No acute findings.

 

IMPRESSION: No acute abdominal findings.

     Electronically signed by SHARON OTERO MD on 08/12/2017 11:11:23 PM ET

## 2017-08-13 NOTE — ECGEPIP
Stationary ECG Study

                           [facility] - ED

                                       

                                       Test Date:    2017

Pat Name:     ISAIAH RUSSELL         Department:   

Patient ID:   [id number]                 Room:         -

Gender:       F                        Technician:   corazon

:          1990               Requested By: ABHIJIT MANCERA

Order Number: [id number]     Reading MD:   Keiko Ferrer

                                 Measurements

Intervals                              Axis          

Rate:         86                       P:            24

CO:           127                      QRS:          33

QRSD:         88                       T:            17

QT:           353                                    

QTc:          422                                    

                           Interpretive Statements

SINUS RHYTHM

DECREASED RATE 17

Electronically Signed On 2017 7:31:39 EDT by Keiko Ferrer

## 2017-09-19 ENCOUNTER — HOSPITAL ENCOUNTER (EMERGENCY)
Dept: HOSPITAL 53 - M ED | Age: 27
LOS: 1 days | Discharge: HOME | End: 2017-09-20
Payer: COMMERCIAL

## 2017-09-19 VITALS — WEIGHT: 210.54 LBS | HEIGHT: 55 IN | BODY MASS INDEX: 48.72 KG/M2
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
## 2017-09-19 DIAGNOSIS — I95.1: Primary | ICD-10-CM
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
## 2017-09-19 DIAGNOSIS — S20.229A: ICD-10-CM

## 2017-09-19 LAB
ANION GAP SERPL CALC-SCNC: 8 MEQ/L (ref 8–16)
BASOPHILS # BLD AUTO: 0 K/MM3 (ref 0–0.2)
BASOPHILS NFR BLD AUTO: 0.3 % (ref 0–1)
BUN SERPL-MCNC: 13 MG/DL (ref 7–18)
CALCIUM SERPL-MCNC: 8.6 MG/DL (ref 8.5–10.1)
CHLORIDE SERPL-SCNC: 108 MEQ/L (ref 98–107)
CO2 SERPL-SCNC: 24 MEQ/L (ref 21–32)
CONTROL LINE HCG: (no result)
CREAT SERPL-MCNC: 0.66 MG/DL (ref 0.55–1.02)
EOSINOPHIL # BLD AUTO: 0.2 K/MM3 (ref 0–0.5)
EOSINOPHIL NFR BLD AUTO: 2.6 % (ref 0–3)
ERYTHROCYTE [DISTWIDTH] IN BLOOD BY AUTOMATED COUNT: 13.3 % (ref 11.5–14.5)
GFR SERPL CREATININE-BSD FRML MDRD: > 60 ML/MIN/{1.73_M2} (ref 60–?)
GLUCOSE SERPL-MCNC: 122 MG/DL (ref 70–105)
LARGE UNSTAINED CELL #: 0.2 K/MM3 (ref 0–0.4)
LARGE UNSTAINED CELL %: 1.6 % (ref 0–4)
LYMPHOCYTES # BLD AUTO: 2.2 K/MM3 (ref 1.5–6.5)
LYMPHOCYTES NFR BLD AUTO: 24.6 % (ref 24–44)
MCH RBC QN AUTO: 29.2 PG (ref 27–33)
MCHC RBC AUTO-ENTMCNC: 34.2 G/DL (ref 32–36.5)
MCV RBC AUTO: 85.3 FL (ref 80–96)
MONOCYTES # BLD AUTO: 0.3 K/MM3 (ref 0–0.8)
MONOCYTES NFR BLD AUTO: 3.5 % (ref 0–5)
NEUTROPHILS # BLD AUTO: 6 K/MM3 (ref 1.8–7.7)
NEUTROPHILS NFR BLD AUTO: 67.4 % (ref 36–66)
PLATELET # BLD AUTO: 294 K/MM3 (ref 150–450)
POTASSIUM SERPL-SCNC: 3.7 MEQ/L (ref 3.5–5.1)
SODIUM SERPL-SCNC: 140 MEQ/L (ref 136–145)
WBC # BLD AUTO: 9 K/MM3 (ref 4–10)

## 2017-09-19 PROCEDURE — 96372 THER/PROPH/DIAG INJ SC/IM: CPT

## 2017-09-19 PROCEDURE — 85025 COMPLETE CBC W/AUTO DIFF WBC: CPT

## 2017-09-19 PROCEDURE — 99283 EMERGENCY DEPT VISIT LOW MDM: CPT

## 2017-09-19 PROCEDURE — 72100 X-RAY EXAM L-S SPINE 2/3 VWS: CPT

## 2017-09-19 PROCEDURE — 80048 BASIC METABOLIC PNL TOTAL CA: CPT

## 2017-09-19 PROCEDURE — 84703 CHORIONIC GONADOTROPIN ASSAY: CPT

## 2017-09-19 PROCEDURE — 96374 THER/PROPH/DIAG INJ IV PUSH: CPT

## 2017-09-20 VITALS — DIASTOLIC BLOOD PRESSURE: 89 MMHG | SYSTOLIC BLOOD PRESSURE: 127 MMHG

## 2018-01-18 ENCOUNTER — HOSPITAL ENCOUNTER (INPATIENT)
Dept: HOSPITAL 53 - M PSY | Age: 28
LOS: 5 days | Discharge: HOME | DRG: 751 | End: 2018-01-23
Attending: PSYCHIATRY & NEUROLOGY | Admitting: PSYCHIATRY & NEUROLOGY
Payer: COMMERCIAL

## 2018-01-18 DIAGNOSIS — F33.9: Primary | ICD-10-CM

## 2018-01-18 DIAGNOSIS — F17.210: ICD-10-CM

## 2018-01-18 DIAGNOSIS — F41.1: ICD-10-CM

## 2018-01-18 DIAGNOSIS — M54.5: ICD-10-CM

## 2018-01-18 DIAGNOSIS — Z62.810: ICD-10-CM

## 2018-01-18 DIAGNOSIS — Z79.899: ICD-10-CM

## 2018-01-18 LAB
ACETAMINOPHEN LEVEL: < 2 UG/ML (ref 10–30)
ALBUMIN/GLOBULIN RATIO: 0.84 (ref 1–1.93)
ALBUMIN: 3.6 GM/DL (ref 3.2–5.2)
ALKALINE PHOSPHATASE: 96 U/L (ref 45–117)
ALT SERPL W P-5'-P-CCNC: 18 U/L (ref 12–78)
AMPHETAMINES UR QL SCN: NEGATIVE
ANION GAP: 8 MEQ/L (ref 8–16)
AST SERPL-CCNC: 14 U/L (ref 7–37)
BARBITURATES UR QL SCN: NEGATIVE
BENZODIAZ UR QL SCN: NEGATIVE
BILIRUB CONJ SERPL-MCNC: < 0.1 MG/DL (ref 0–0.2)
BILIRUBIN,TOTAL: 0.3 MG/DL (ref 0.2–1)
BLOOD UREA NITROGEN: 8 MG/DL (ref 7–18)
BZE UR QL SCN: NEGATIVE
CALCIUM LEVEL: 8.7 MG/DL (ref 8.5–10.1)
CANNABINOIDS UR QL SCN: NEGATIVE
CARBON DIOXIDE LEVEL: 25 MEQ/L (ref 21–32)
CHLORIDE LEVEL: 105 MEQ/L (ref 98–107)
CONTROL LINE HCG: (no result)
CREATININE FOR GFR: 0.56 MG/DL (ref 0.55–1.02)
ETHYL ALCOHOL (ETHANOL): 0.01 % (ref 0–0.01)
GFR SERPL CREATININE-BSD FRML MDRD: > 60 ML/MIN/{1.73_M2} (ref 60–?)
GLUCOSE, FASTING: 101 MG/DL (ref 70–105)
HCG, SERUM QUALITATIVE: POSITIVE
HCG, SERUM QUANTITATIVE: < 1 MIU/ML
HEMATOCRIT: 37.2 % (ref 36–47)
HEMOGLOBIN: 12.2 G/DL (ref 12–16)
MEAN CORPUSCULAR HEMOGLOBIN: 27.3 PG (ref 27–33)
MEAN CORPUSCULAR HGB CONC: 32.8 G/DL (ref 32–36.5)
MEAN CORPUSCULAR VOLUME: 83.2 FL (ref 80–96)
METHADONE URINE: NEGATIVE
NRBC BLD AUTO-RTO: 0 % (ref 0–0)
OPIATES UR QL SCN: NEGATIVE
PCP UR QL SCN: NEGATIVE
PLATELET COUNT, AUTOMATED: 307 10^3/UL (ref 150–450)
POTASSIUM SERUM: 4.1 MEQ/L (ref 3.5–5.1)
RED BLOOD COUNT: 4.47 10^6/UL (ref 4–5.4)
RED CELL DISTRIBUTION WIDTH: 13.1 % (ref 11.5–14.5)
SALICYLATE LEVEL: 2.9 MG/DL (ref 5–30)
SODIUM LEVEL: 138 MEQ/L (ref 136–145)
THYROID STIMULATING HORMONE: 1.92 UIU/ML (ref 0.36–3.74)
TOTAL PROTEIN: 7.9 GM/DL (ref 6.4–8.2)
WHITE BLOOD COUNT: 6.8 10^3/UL (ref 4–10)

## 2018-01-18 RX ADMIN — ACETAMINOPHEN 1 MG: 325 TABLET ORAL at 21:50

## 2018-01-18 RX ADMIN — NICOTINE 1 PATCH: 21 PATCH, EXTENDED RELEASE TRANSDERMAL at 13:00

## 2018-01-19 RX ADMIN — CHLORHEXIDINE GLUCONATE 1 ML: 1.2 RINSE ORAL at 15:45

## 2018-01-19 RX ADMIN — CHLORHEXIDINE GLUCONATE 1 ML: 1.2 RINSE ORAL at 20:16

## 2018-01-19 RX ADMIN — Medication 1: at 20:16

## 2018-01-19 RX ADMIN — LIDOCAINE 1 PATCH: 50 PATCH CUTANEOUS at 10:23

## 2018-01-19 RX ADMIN — ESCITALOPRAM OXALATE 1 MG: 10 TABLET, FILM COATED ORAL at 15:45

## 2018-01-20 RX ADMIN — CHLORHEXIDINE GLUCONATE 1 ML: 1.2 RINSE ORAL at 16:00

## 2018-01-20 RX ADMIN — Medication 1: at 20:18

## 2018-01-20 RX ADMIN — CHLORHEXIDINE GLUCONATE 1 ML: 1.2 RINSE ORAL at 08:26

## 2018-01-20 RX ADMIN — CHLORHEXIDINE GLUCONATE 1 ML: 1.2 RINSE ORAL at 20:17

## 2018-01-20 RX ADMIN — LIDOCAINE 1 PATCH: 50 PATCH CUTANEOUS at 08:26

## 2018-01-20 RX ADMIN — ESCITALOPRAM OXALATE 1 MG: 10 TABLET, FILM COATED ORAL at 08:26

## 2018-01-21 RX ADMIN — CHLORHEXIDINE GLUCONATE 1 ML: 1.2 RINSE ORAL at 08:20

## 2018-01-21 RX ADMIN — ACETAMINOPHEN 1 MG: 325 TABLET ORAL at 12:27

## 2018-01-21 RX ADMIN — Medication 1: at 20:52

## 2018-01-21 RX ADMIN — CHLORHEXIDINE GLUCONATE 1 ML: 1.2 RINSE ORAL at 16:00

## 2018-01-21 RX ADMIN — CHLORHEXIDINE GLUCONATE 1 ML: 1.2 RINSE ORAL at 20:54

## 2018-01-21 RX ADMIN — LIDOCAINE 1 PATCH: 50 PATCH CUTANEOUS at 08:19

## 2018-01-21 RX ADMIN — ESCITALOPRAM OXALATE 1 MG: 10 TABLET, FILM COATED ORAL at 08:19

## 2018-01-22 LAB
CONTROL LINE UCG: (no result)
URINE PREG TEST: NEGATIVE

## 2018-01-22 RX ADMIN — CHLORHEXIDINE GLUCONATE 1 ML: 1.2 RINSE ORAL at 15:43

## 2018-01-22 RX ADMIN — ESCITALOPRAM OXALATE 1 MG: 10 TABLET, FILM COATED ORAL at 08:05

## 2018-01-22 RX ADMIN — LIDOCAINE 1 PATCH: 50 PATCH CUTANEOUS at 09:00

## 2018-01-22 RX ADMIN — Medication 1: at 21:00

## 2018-01-22 RX ADMIN — CHLORHEXIDINE GLUCONATE 1 ML: 1.2 RINSE ORAL at 21:24

## 2018-01-22 RX ADMIN — CHLORHEXIDINE GLUCONATE 1 ML: 1.2 RINSE ORAL at 08:07

## 2018-01-23 RX ADMIN — LIDOCAINE 1 PATCH: 50 PATCH CUTANEOUS at 08:15

## 2018-01-23 RX ADMIN — CHLORHEXIDINE GLUCONATE 1 ML: 1.2 RINSE ORAL at 08:16

## 2018-01-23 RX ADMIN — ESCITALOPRAM OXALATE 1 MG: 10 TABLET, FILM COATED ORAL at 12:42

## 2018-03-04 ENCOUNTER — HOSPITAL ENCOUNTER (EMERGENCY)
Dept: HOSPITAL 53 - M ED | Age: 28
Discharge: HOME | End: 2018-03-04
Payer: COMMERCIAL

## 2018-03-04 DIAGNOSIS — F17.210: ICD-10-CM

## 2018-03-04 DIAGNOSIS — R10.9: Primary | ICD-10-CM

## 2018-03-04 DIAGNOSIS — E66.8: ICD-10-CM

## 2018-03-04 DIAGNOSIS — R11.0: ICD-10-CM

## 2018-03-04 LAB
ALBUMIN/GLOBULIN RATIO: 0.82 (ref 1–1.93)
ALBUMIN: 3.6 GM/DL (ref 3.2–5.2)
ALKALINE PHOSPHATASE: 97 U/L (ref 45–117)
ALT SERPL W P-5'-P-CCNC: 15 U/L (ref 12–78)
AMORPHOUS SEDIMENT RFX: (no result)
ANION GAP: 6 MEQ/L (ref 8–16)
AST SERPL-CCNC: 21 U/L (ref 7–37)
BASO #: 0 10^3/UL (ref 0–0.2)
BASO %: 0.3 % (ref 0–1)
BILIRUB CONJ SERPL-MCNC: < 0.1 MG/DL (ref 0–0.2)
BILIRUBIN,TOTAL: 0.2 MG/DL (ref 0.2–1)
BLOOD UREA NITROGEN: 11 MG/DL (ref 7–18)
CALCIUM LEVEL: 8.8 MG/DL (ref 8.5–10.1)
CARBON DIOXIDE LEVEL: 26 MEQ/L (ref 21–32)
CHLORIDE LEVEL: 107 MEQ/L (ref 98–107)
CREATININE FOR GFR: 0.59 MG/DL (ref 0.55–1.3)
EOS #: 0.3 10^3/UL (ref 0–0.5)
EOSINOPHIL NFR BLD AUTO: 2.5 % (ref 0–3)
GFR SERPL CREATININE-BSD FRML MDRD: > 60 ML/MIN/{1.73_M2} (ref 60–?)
GLUCOSE, FASTING: 118 MG/DL (ref 70–100)
HEMATOCRIT: 35.9 % (ref 36–47)
HEMOGLOBIN: 11.6 G/DL (ref 12–16)
IMMATURE GRANULOCYTE %: 0.3 % (ref 0–3)
LEUKOCYTE ESTERASE UR AUTO RFX: NEGATIVE
LIPASE: 203 U/L (ref 73–393)
LYMPH #: 2.5 10^3/UL (ref 1.5–6.5)
LYMPH %: 25 % (ref 24–44)
MEAN CORPUSCULAR HEMOGLOBIN: 27.3 PG (ref 27–33)
MEAN CORPUSCULAR HGB CONC: 32.3 G/DL (ref 32–36.5)
MEAN CORPUSCULAR VOLUME: 84.5 FL (ref 80–96)
MONO #: 0.6 10^3/UL (ref 0–0.8)
MONO %: 5.6 % (ref 0–5)
NEUTROPHILS #: 6.5 10^3/UL (ref 1.8–7.7)
NEUTROPHILS %: 66.3 % (ref 36–66)
NRBC BLD AUTO-RTO: 0 % (ref 0–0)
PLATELET COUNT, AUTOMATED: 309 10^3/UL (ref 150–450)
POTASSIUM SERUM: 4.5 MEQ/L (ref 3.5–5.1)
RED BLOOD COUNT: 4.25 10^6/UL (ref 4–5.4)
RED CELL DISTRIBUTION WIDTH: 13.8 % (ref 11.5–14.5)
SODIUM LEVEL: 139 MEQ/L (ref 136–145)
SPECIFIC GRAVITY UR AUTO RFX: 1.02 (ref 1–1.03)
SQUAM EPITHELIAL CELL UR AURFX: 7 /HPF (ref 0–6)
TOTAL PROTEIN: 8 GM/DL (ref 6.4–8.2)
WHITE BLOOD COUNT: 9.8 10^3/UL (ref 4–10)

## 2018-03-04 RX ADMIN — DEXTROSE MONOHYDRATE 1 MG: 50 INJECTION, SOLUTION INTRAVENOUS at 22:41

## 2018-03-04 RX ADMIN — ONDANSETRON 1 MG: 2 INJECTION INTRAMUSCULAR; INTRAVENOUS at 22:40

## 2018-03-04 RX ADMIN — SODIUM CHLORIDE 1 MLS/HR: 9 INJECTION, SOLUTION INTRAVENOUS at 22:41

## 2018-03-28 ENCOUNTER — HOSPITAL ENCOUNTER (OUTPATIENT)
Dept: HOSPITAL 53 - M LAB REF | Age: 28
End: 2018-03-28
Attending: PHYSICIAN ASSISTANT
Payer: COMMERCIAL

## 2018-03-28 DIAGNOSIS — J02.9: Primary | ICD-10-CM

## 2018-03-28 PROCEDURE — 87070 CULTURE OTHR SPECIMN AEROBIC: CPT

## 2018-07-07 ENCOUNTER — HOSPITAL ENCOUNTER (EMERGENCY)
Dept: HOSPITAL 53 - M ED | Age: 28
Discharge: HOME | End: 2018-07-07
Payer: MEDICAID

## 2018-07-07 DIAGNOSIS — J06.9: Primary | ICD-10-CM

## 2018-07-07 DIAGNOSIS — Z72.0: ICD-10-CM

## 2018-07-07 DIAGNOSIS — J45.909: ICD-10-CM

## 2018-07-07 DIAGNOSIS — J98.01: ICD-10-CM

## 2018-07-07 PROCEDURE — 87880 STREP A ASSAY W/OPTIC: CPT

## 2018-07-20 NOTE — REP
Lu Smith is here for scheduled HDR brachytherapy    HDR Single Channel Cylinder  2    Pre-procedure-     Patient identified, arm band applied, signed consent available   Medications taken by patient prior to procedure, imodium this am for diarrhea  Pain assessment: 0/10. Occasional cramping, relieved with Ibuprofen.  /82  Pulse 92  SpO2 96%    Post-procedure-  Pain assessment: 0/10   Device removed without difficulty, Education for possible side effects and management, Discharge teaching reviewed, questions answered, Vaginal dilator use reviewed, Follow-up scheduled and Discharged to home       Lumbar spine three views:

 

There are no comparisons.

 

Vertebral body heights, interspacing alignment are normal.  The pedicles, facets

and sacroiliac articulations are unremarkable.  There is no spondylolysis or

spondylolisthesis.

 

Impression:

 

Negative lumbar spine.

 

 

Signed by

Dae Baumann MD 09/20/2017 07:58 A

## 2018-08-22 ENCOUNTER — HOSPITAL ENCOUNTER (EMERGENCY)
Dept: HOSPITAL 53 - M ED | Age: 28
LOS: 1 days | Discharge: HOME | End: 2018-08-23
Payer: COMMERCIAL

## 2018-08-22 DIAGNOSIS — Z79.899: ICD-10-CM

## 2018-08-22 DIAGNOSIS — F17.210: ICD-10-CM

## 2018-08-22 DIAGNOSIS — F41.9: ICD-10-CM

## 2018-08-22 DIAGNOSIS — F33.9: ICD-10-CM

## 2018-08-22 DIAGNOSIS — M79.601: Primary | ICD-10-CM

## 2018-08-22 DIAGNOSIS — J45.909: ICD-10-CM

## 2018-08-22 DIAGNOSIS — K21.9: ICD-10-CM

## 2018-08-22 PROCEDURE — 73080 X-RAY EXAM OF ELBOW: CPT

## 2018-08-29 ENCOUNTER — HOSPITAL ENCOUNTER (EMERGENCY)
Dept: HOSPITAL 53 - M ED | Age: 28
Discharge: HOME | End: 2018-08-29
Payer: COMMERCIAL

## 2018-08-29 DIAGNOSIS — R31.9: ICD-10-CM

## 2018-08-29 DIAGNOSIS — N39.0: Primary | ICD-10-CM

## 2018-08-29 DIAGNOSIS — F17.210: ICD-10-CM

## 2018-08-29 DIAGNOSIS — F41.9: ICD-10-CM

## 2018-08-29 DIAGNOSIS — M54.9: ICD-10-CM

## 2018-08-29 DIAGNOSIS — F32.9: ICD-10-CM

## 2018-08-29 LAB
LEUKOCYTE ESTERASE UR AUTO RFX: (no result)
SPECIFIC GRAVITY UR AUTO RFX: 1.01 (ref 1–1.03)
SQUAM EPITHELIAL CELL UR AURFX: 0 /HPF (ref 0–6)

## 2018-08-29 PROCEDURE — 74176 CT ABD & PELVIS W/O CONTRAST: CPT

## 2018-08-29 RX ADMIN — CIPROFLOXACIN HYDROCHLORIDE 1 MG: 500 TABLET, FILM COATED ORAL at 21:22

## 2018-11-16 ENCOUNTER — HOSPITAL ENCOUNTER (OUTPATIENT)
Dept: HOSPITAL 53 - M LAB | Age: 28
End: 2018-11-16
Attending: FAMILY MEDICINE
Payer: COMMERCIAL

## 2018-11-16 DIAGNOSIS — F33.9: Primary | ICD-10-CM

## 2018-11-16 LAB
ALBUMIN/GLOBULIN RATIO: 0.9 (ref 1–1.93)
ALBUMIN: 3.5 GM/DL (ref 3.2–5.2)
ALKALINE PHOSPHATASE: 101 U/L (ref 45–117)
ALT SERPL W P-5'-P-CCNC: 14 U/L (ref 12–78)
ANION GAP: 5 MEQ/L (ref 8–16)
AST SERPL-CCNC: 12 U/L (ref 7–37)
BASO #: 0 10^3/UL (ref 0–0.2)
BASO %: 0.4 % (ref 0–1)
BILIRUBIN,TOTAL: 0.3 MG/DL (ref 0.2–1)
BLOOD UREA NITROGEN: 7 MG/DL (ref 7–18)
CALCIUM LEVEL: 8.9 MG/DL (ref 8.5–10.1)
CARBON DIOXIDE LEVEL: 28 MEQ/L (ref 21–32)
CHLORIDE LEVEL: 104 MEQ/L (ref 98–107)
CHOLEST SERPL-MCNC: 183 MG/DL (ref ?–200)
CHOLESTEROL RISK RATIO: 5.9 (ref ?–5)
CREATININE FOR GFR: 0.64 MG/DL (ref 0.55–1.3)
EOS #: 0.4 10^3/UL (ref 0–0.5)
EOSINOPHIL NFR BLD AUTO: 4.1 % (ref 0–3)
EST. AVERAGE GLUCOSE BLD GHB EST-MCNC: 120 MG/DL (ref 60–110)
FREE T4: 1.05 NG/DL (ref 0.76–1.46)
GFR SERPL CREATININE-BSD FRML MDRD: > 60 ML/MIN/{1.73_M2} (ref 60–?)
GLUCOSE, FASTING: 89 MG/DL (ref 70–100)
HDLC SERPL-MCNC: 31 MG/DL (ref 40–?)
HEMATOCRIT: 34.5 % (ref 36–47)
HEMOGLOBIN: 11.1 G/DL (ref 12–15.5)
IMMATURE GRANULOCYTE #: 0.1 10^3/UL (ref 0–0)
IMMATURE GRANULOCYTE %: 0.5 % (ref 0–3)
LDL CHOLESTEROL: 107 MG/DL (ref ?–100)
LYMPH #: 2.5 10^3/UL (ref 1.5–4.5)
LYMPH %: 26.5 % (ref 24–44)
MEAN CORPUSCULAR HEMOGLOBIN: 27.5 PG (ref 27–33)
MEAN CORPUSCULAR HGB CONC: 32.2 G/DL (ref 32–36.5)
MEAN CORPUSCULAR VOLUME: 85.4 FL (ref 80–96)
MONO #: 0.4 10^3/UL (ref 0–0.8)
MONO %: 4.7 % (ref 0–5)
NEUTROPHILS #: 6 10^3/UL (ref 1.8–7.7)
NEUTROPHILS %: 63.8 % (ref 36–66)
NONHDLC SERPL-MCNC: 152 MG/DL
NRBC BLD AUTO-RTO: 0 % (ref 0–0)
PLATELET COUNT, AUTOMATED: 318 10^3/UL (ref 150–450)
POTASSIUM SERUM: 4.1 MEQ/L (ref 3.5–5.1)
RED BLOOD COUNT: 4.04 10^6/UL (ref 4–5.4)
RED CELL DISTRIBUTION WIDTH: 13 % (ref 11.5–14.5)
SODIUM LEVEL: 137 MEQ/L (ref 136–145)
THYROID STIMULATING HORMONE: 2.87 UIU/ML (ref 0.36–3.74)
TOTAL PROTEIN: 7.4 GM/DL (ref 6.4–8.2)
TRIGLYCERIDES LEVEL: 224 MG/DL (ref ?–150)
WHITE BLOOD COUNT: 9.3 10^3/UL (ref 4–10)

## 2018-11-16 PROCEDURE — 84443 ASSAY THYROID STIM HORMONE: CPT

## 2018-12-26 ENCOUNTER — HOSPITAL ENCOUNTER (EMERGENCY)
Dept: HOSPITAL 53 - M ED | Age: 28
Discharge: HOME | End: 2018-12-26
Payer: COMMERCIAL

## 2018-12-26 VITALS — DIASTOLIC BLOOD PRESSURE: 106 MMHG | SYSTOLIC BLOOD PRESSURE: 149 MMHG

## 2018-12-26 VITALS — WEIGHT: 216.49 LBS | HEIGHT: 64 IN | BODY MASS INDEX: 36.96 KG/M2

## 2018-12-26 DIAGNOSIS — F17.210: ICD-10-CM

## 2018-12-26 DIAGNOSIS — F33.9: ICD-10-CM

## 2018-12-26 DIAGNOSIS — J06.9: Primary | ICD-10-CM

## 2019-01-15 ENCOUNTER — HOSPITAL ENCOUNTER (OUTPATIENT)
Dept: HOSPITAL 53 - M LAB | Age: 29
End: 2019-01-15
Attending: FAMILY MEDICINE
Payer: COMMERCIAL

## 2019-01-15 DIAGNOSIS — R30.1: ICD-10-CM

## 2019-01-15 DIAGNOSIS — F41.9: ICD-10-CM

## 2019-01-15 DIAGNOSIS — F33.9: ICD-10-CM

## 2019-01-15 DIAGNOSIS — D64.9: ICD-10-CM

## 2019-01-15 DIAGNOSIS — R73.9: ICD-10-CM

## 2019-01-15 DIAGNOSIS — E66.9: Primary | ICD-10-CM

## 2019-01-15 LAB
ALBUMIN SERPL BCG-MCNC: 3.4 GM/DL (ref 3.2–5.2)
ALT SERPL W P-5'-P-CCNC: 15 U/L (ref 12–78)
BASOPHILS # BLD AUTO: 0 10^3/UL (ref 0–0.2)
BASOPHILS NFR BLD AUTO: 0.4 % (ref 0–1)
BILIRUB SERPL-MCNC: 0.2 MG/DL (ref 0.2–1)
BUN SERPL-MCNC: 7 MG/DL (ref 7–18)
CALCIUM SERPL-MCNC: 8.4 MG/DL (ref 8.5–10.1)
CHLORIDE SERPL-SCNC: 105 MEQ/L (ref 98–107)
CHOLEST SERPL-MCNC: 163 MG/DL (ref ?–200)
CHOLEST/HDLC SERPL: 5.62 {RATIO} (ref ?–5)
CO2 SERPL-SCNC: 24 MEQ/L (ref 21–32)
CREAT SERPL-MCNC: 0.59 MG/DL (ref 0.55–1.3)
EOSINOPHIL # BLD AUTO: 0.3 10^3/UL (ref 0–0.5)
EOSINOPHIL NFR BLD AUTO: 3.6 % (ref 0–3)
EST. AVERAGE GLUCOSE BLD GHB EST-MCNC: 111 MG/DL (ref 60–110)
GFR SERPL CREATININE-BSD FRML MDRD: > 60 ML/MIN/{1.73_M2} (ref 60–?)
GLUCOSE SERPL-MCNC: 85 MG/DL (ref 70–100)
HCT VFR BLD AUTO: 34.4 % (ref 36–47)
HDLC SERPL-MCNC: 29 MG/DL (ref 40–?)
HGB BLD-MCNC: 11.1 G/DL (ref 12–15.5)
LDLC SERPL CALC-MCNC: 102 MG/DL (ref ?–100)
LYMPHOCYTES # BLD AUTO: 2.5 10^3/UL (ref 1.5–4.5)
LYMPHOCYTES NFR BLD AUTO: 30 % (ref 24–44)
MCH RBC QN AUTO: 27.4 PG (ref 27–33)
MCHC RBC AUTO-ENTMCNC: 32.3 G/DL (ref 32–36.5)
MCV RBC AUTO: 84.9 FL (ref 80–96)
MONOCYTES # BLD AUTO: 0.4 10^3/UL (ref 0–0.8)
MONOCYTES NFR BLD AUTO: 4.2 % (ref 0–5)
NEUTROPHILS # BLD AUTO: 5.1 10^3/UL (ref 1.8–7.7)
NEUTROPHILS NFR BLD AUTO: 61.4 % (ref 36–66)
NONHDLC SERPL-MCNC: 134 MG/DL
PLATELET # BLD AUTO: 298 10^3/UL (ref 150–450)
POTASSIUM SERPL-SCNC: 4.1 MEQ/L (ref 3.5–5.1)
PROT SERPL-MCNC: 7.5 GM/DL (ref 6.4–8.2)
RBC # BLD AUTO: 4.05 10^6/UL (ref 4–5.4)
SODIUM SERPL-SCNC: 139 MEQ/L (ref 136–145)
T4 FREE SERPL-MCNC: 1.07 NG/DL (ref 0.76–1.46)
TRIGL SERPL-MCNC: 160 MG/DL (ref ?–150)
TSH SERPL DL<=0.005 MIU/L-ACNC: 1.46 UIU/ML (ref 0.36–3.74)
WBC # BLD AUTO: 8.3 10^3/UL (ref 4–10)

## 2019-04-26 ENCOUNTER — HOSPITAL ENCOUNTER (OUTPATIENT)
Dept: HOSPITAL 53 - M RAD | Age: 29
End: 2019-04-26
Attending: FAMILY MEDICINE
Payer: COMMERCIAL

## 2019-04-26 DIAGNOSIS — M25.531: Primary | ICD-10-CM

## 2019-04-26 NOTE — REP
RIGHT WRIST, FOUR VIEWS:

 

HISTORY:  Wrist pain.

 

There is no acute fracture or dislocation.  The joint spaces are normal in

appearance.

 

IMPRESSION:

 

There is no acute fracture or dislocation.

 

 

Electronically Signed by

Akil Campuzano MD 04/26/2019 02:40 P

## 2020-02-21 ENCOUNTER — HOSPITAL ENCOUNTER (OUTPATIENT)
Dept: HOSPITAL 53 - M LAB | Age: 30
End: 2020-02-21
Attending: PHYSICIAN ASSISTANT
Payer: COMMERCIAL

## 2020-02-21 DIAGNOSIS — R51: Primary | ICD-10-CM

## 2020-02-21 DIAGNOSIS — R70.0: ICD-10-CM

## 2020-06-23 ENCOUNTER — HOSPITAL ENCOUNTER (OUTPATIENT)
Dept: HOSPITAL 53 - M LAB | Age: 30
End: 2020-06-23
Attending: PHYSICIAN ASSISTANT
Payer: COMMERCIAL

## 2020-06-23 DIAGNOSIS — R51: Primary | ICD-10-CM

## 2020-07-14 ENCOUNTER — HOSPITAL ENCOUNTER (OUTPATIENT)
Dept: HOSPITAL 53 - M PAIN | Age: 30
End: 2020-07-14
Attending: NURSE PRACTITIONER
Payer: COMMERCIAL

## 2020-07-14 DIAGNOSIS — M79.18: Primary | ICD-10-CM

## 2020-07-16 NOTE — ECWPNPC
PATIENT NAME: ISAIAH RUSSELL

: 1990

GENDER: FEMALE

MRN: [id number]

VISIT DATE: 2020

DISCHARGE DATE: 20 1345

VISIT LOCKED DATE TIME: 

PHYSICIAN: CHARLEY GUTIÉRREZ 

RESOURCE: CHARLEY GUTIÉRREZ 

 

           

           

REASON FOR APPOINTMENT

           

          1. BACK PAIN

           

HISTORY OF PRESENT ILLNESS

           

      GENERAL:

           - 30-YEAR-OLD FEMALE IN FOR INITIAL PAIN CONSULT. PATIENT STATES

          HER PAIN IS LOCATED IN HER LOW BACK AND RADIATES UP INTO HER

          THORACIC AREA. SHE RATES HER PAIN CURRENTLY AT A 10 OUT OF 10 AND

          DESCRIBES IT AS ACHING AND SHARP. PATIENT WAS SEEN BY ANOTHER

          PAIN CENTER WHO SHE ADMITS WAS GOING TO DO TRIGGER POINT

          INJECTIONS WITH HER. SHE DENIES HISTORY OF TRAUMA AND/OR

          TREATMENT MODALITIES THAT HAVE BEEN EFFECTIVE IN REDUCING HER

          PAIN SYMPTOMS.

           

      FALL RISK SCREENING:

      SCREENING

           

           

          :NO FALLS REPORTED IN THE LAST YEAR

           

      PAIN SCREENING:

      PATIENT HAS A COMPLAINT OF ACUTE OR CHRONIC PAIN

           

           

          :YES

          LOCATION OF PAIN:LOW BACK RADIATES UP THRU THE SPINE

          INTENSITY OF PAIN (SCALE OF 1 TO 10):10

          WHAT DOES YOUR PAIN FEEL LIKE:ACHING, SHARP

          DURATION:CONTINOUS, CONSTANT, ALL DAY, AWAKENS FROM SLEEP

          PAIN IS INCREASED BY:ACTIVITIES, PROLONGED STANDING

          PAIN IS DECREASED BY:USE OF PAIN MEDICATIONS IBUPROFEN

          LEVEL OF RELIEF FROM PAIN TREATMENTS IN THE PAST:25%

          PAIN HAS INTERFERED WITH THE FOLLOWING:MOOD, WALKING ABILITY,

          HOUSEWORK, SLEEP, RELATIONSHIP WITH OTHERS, ENJOYMENT OF LIFE

          PLAN/GOALS/TREATMENT/INTERVENTION/FOLLOW UP:SEE PLAN

           

      NURSING NOTE:

           - -.

           

      PAIN CENTER INTAKE QUESTIONS:

      DO YOU HAVE A HISTORY OF MRSA?

           

           

          :NO

           

      DO YOU TAKE A BLOOD THINNERS?

           

           

          :NO

           

      DO YOU HAVE ANY BLEEDING DISORDERS?

           

           

          :NO

           

      ANY NEW NUMBNESS OR WEAKNESS IN YOUR LEGS OR ARMS?

           

           

          :NO

           

      ANY PACEMAKER,DEFIBRILLATOR, OR DORSAL COLUMN

          STIMULATOR?

           

           

          :NO

           

      DO YOU HAVE ANY RASHES OR OPEN SORES?

           

           

          :NO

           

      ARE YOU ALLERGIC TO IV DYE?

           

           

          :NO

           

      ARE YOU DIABETIC?

           

           

          :NO

           

      ANY NEW PROBLEMS WITH YOUR MEDICATIONS?

           

           

          :NO

           

      HAVE YOU RECEIVED A VACCINE IN THE PAST 30 DAYS?

           

           

          :NO

           

      DO YOU PLAN TO RECEIVE A VACCINE IN THE NEXT 21

          DAYS?

           

           

          :NO

           

      DO YOU NEED ANY PRESCRIPTION?

           

           

          :NO

           

      DO YOU TAKE ANY IMMUNOSUPPRESSIVE MEDICATIONS?

           

           

          :NO

           

CURRENT MEDICATIONS

           

          TAKING TRAZODONE HCL 50 MG TABLET 1 TABLET AT BEDTIME AS NEEDED

          ORALLY ONCE A DAY, NOTES: [facility]

          NOT-TAKING ESCITALOPRAM OXALATE 20 MG TABLET 0.5 TABLET ORALLY

          ONCE A DAY, NOTES: [facility]

          NOT-TAKING HYDROXYZINE HCL 50 MG TABLET 1 TABLET AS NEEDED ORALLY

          TID AS NEEDED, NOTES: [facility]

          MEDICATION LIST REVIEWED AND RECONCILED WITH THE PATIENT

           

PAST MEDICAL HISTORY

           

          DEPRESSION/ANXIETY

          INSOMNIA

          ASTHMA

           

ALLERGIES

           

          SEASONALE

           

SURGICAL HISTORY

           

          TUBAL PREGNANCY- SYR 2016

           

FAMILY HISTORY

           

          FATHER: UNKNOWN

          MOTHER: UNKNOWN

          PATERNAL GRAND FATHER: UNKNOWN

          PATERNAL GRAND MOTHER: UNKNOWN

          MATERNAL GRAND FATHER: UNKNOWN

          MATERNAL GRAND MOTHER: UNKNOWN

          MOTHER PASSED AWAY- UNKNOWN MEDICAL ISSUES. LOST CONTACT WITH

          FATHER.

           

SOCIAL HISTORY

           

          GENERAL:

           

          TOBACCO USE

          ARE YOU A:CURRENT SMOKER

          ARE YOU INTERESTED IN QUITTING?NOT READY TO QUIT

          COUNSELED THE PATIENT ON SMOKING EFFECTS, EDUCATION

          [id number]/14/2020

          HOW MANY CIGARETTES A DAY DO YOU SMOKE?6-10

          HOW SOON AFTER YOU WAKE UP DO YOU SMOKE YOUR FIRST

          CIGARETTE?AFTER 60 MIN

          HOW OFTEN DO YOU SMOKE CIGARETTES?EVERY DAY

          PATIENT COUNSELED ON THE DANGERS OF TOBACCO USE AND URGED TO

          QUIT:2020

          SMOKING CESSATION INFORMATION GIVEN2020

           

           

          LATEX QUESTIONNAIRE

          LATEX ALLERGY : HAVE YOU EVER DEVELOPED ANY TYPE OF REACTION

          AFTER HANDLING LATEX PRODUCTS SUCH AS RUBBER GLOVES, CONDOMS,

          DIAPHRAGMS, BALLOONS, SOCKS, OR UNDERWEAR?NO

          LATEX ALLERGY : HAVE YOU EVER DEVELOPED ANY TYPE OF REACTION

          DURING OR AFTER DENTAL APPOINTMENT, VAGINAL/RECTAL EXAMINATION,

          SURGICAL PROCEDURE, OR ANY OTHER EXPOSURE?NO

          LATEX RISK : HAVE YOU EVER HAD ANY DIFFICULTY BREATHING OR HIVES

          AFTER EATING OR HANDLING ANY FRUITS, OR VEGETABLES; SUCH AS KIWI,

          BANANAS, STONE FRUITS, OR CHESTNUTSNO

          LATEX RISK : DO YOU HAVE A PREVIOUS PERSONAL HISTORY OF MORE THAN

          NINE SURGERIES, SPINA BIFIDA, OR REPEATED CATHERIZATIONS? NO

          LATEX RISK : ARE YOU FREQUENTLY EXPOSED TO LATEX PRODUCTS IN YOUR

          OCCUPATION?NO

          DATE ASKED : 2020

           

           

          ALCOHOL SCREENING

          DID YOU HAVE A DRINK CONTAINING ALCOHOL IN THE PAST YEAR?NO

          POINTS0

          INTERPRETATIONNEGATIVE

           

           

          RECREATIONAL DRUG USE

          DRUG USE?NO

           

           

          CAFFEINE

          CAFFEINE USE?YES 2 SODAS DAILY

           

           

          SEXUAL HX

          HAD SEX IN THE LAST 12 MONTHS (VAGINAL, ORAL, OR ANAL)?YES

          WITHMEN ONLY

          PREVENTION STRATEGIES DISCUSSED:OTHER

          USE PROTECTION?YES

          HOW OFTEN?SOME OF THE TIME

          LMP:3/19/18

          HAVE YOU EVER HAD AN STD?NO

           

           

          HIV / HEP-C SCREENING

          HIV TEST OFFERED TO PATIENT:YES

          DATE OFFERED:2018

          TEST ACCEPTED:YES

          BROCHURE PROVIDED TO PATIENTYES

           

           

          Oriental orthodox

          [id number] NONE

           

           

          LANGUAGE

          LANGUAGES SPOKEN:ENGLISH

           

           

          EDUCATION

          LEVEL OF EDUCATION:HIGH SCHOOL

           

           

          LEARNING BARRIERS / SPECIAL NEEDS

          HEARING IMPAIRED?NO

          VISION IMPAIRED?YES

          COGNITIVELY IMPAIRED?YES

          :CORRECTIVE LENSES

          :MENTAL RETARDATION

          READINESS TO LEARN?YES

          LEARNING PREFERENCES?YES

          LEARNING CAPABILITIES PRESENT?YES

           

           

          DOMESTIC VIOLENCE

          STATUS:SINGLE

          DO YOU FEEL SAFE IN YOUR ENVIRONMENT?YES

           

           

          OCCUPATION: LILIANALED- 

           

           

          MARITAL STATUS: SINGLE.

           

           

          OTHERS AT HOME: EX PARTNER, NOW ROOM MATES.

           

           

          PAIN CLINIC PFS, CLERGY, PUBLIC HEALTH REFERRALS

          PFS REFERRAL NEEDED?NO

          CLERGY REFERRAL NEEDED?NO

          PUBLIC HEALTH REFERRAL NEEDED?NO

          WAS THE PROVIDER NOTIFIED OF ANY PERTINENT INFO?YES

          HAS THE PATIENT BEEN EDUCATED REGARDING HIS/HER PLAN OF CARE?YES

          HAS THE PATIENT BEEN EDUCATED REGARDING PAIN, THE RISK FOR PAIN,

          THE IMPORTANCE OF EFFECTIVE PAIN MANAGEMENT, AND THE PAIN

          ASSESSMENT PROCESS?YES

           

HOSPITALIZATION/MAJOR DIAGNOSTIC PROCEDURE

           

          NO HOSPITALIZATION HISTORY.

           

REVIEW OF SYSTEMS

           

      CONSTITUTIONAL:

           

          ANY RECENT FEVER    NO . CHILLS    NO . WEIGHT CHANGE OF UNKNOWN

          REASONS    NO .

           

      MUSCULOSKELETAL:

           

          ANY UNUSUAL JOINT PAIN OR SWELLING NOT MENTIONED    NO . SYSTEMIC

          LUPUS    NO . ANY NEUROMUSCULAR DISORDER NOT MENTIONED    NO .

          LYME DISEASE    NO .

           

      GASTROENTEROLOGY:

           

          ANY NEW CHANGE IN BOWEL CONTROL?    NO . HISTORY OF LIVER

          DISORDER NOT MENTIONED    NO . HISTORY OF UNUSUAL ABDOMINAL PAIN

          OR CRAMPING NOT MENTIONED    NO . NO CONSTIPATION.

           

      GENITOURINARY:

           

          ANY NEW CHANGE IN BLADDER CONTROL?    NO . ANY RENAL/KIDNEY

          CONDITON NOT MENTIONED    NO .

           

      NEUROLOGY:

           

          HISTORY OF TBI NOT MENTIONED    NO . OTHER NEW NUMBNESS OR PAIN

          PATTERNS NOT MENTIONED    NO . NEW ONSET DIZZINESS OR

          NEUROLOGICAL CHANGES NOT MENTIONED    NO . HISTORY OF SEVERE

          HEADACHES NOT MENTIONED    NO . HISTORY OF STROKE OR NEUROLOGICAL

          DISORDER NOT MENTIONED    NO .

           

      CARDIOLOGY:

           

          HEART SURGERY    NO . CONGESTIVE HEART FAILURE/FLUID OVERLOAD NOT

          MENTIONED    NO . HISTORY OF CHEST PAIN,IRREGULAR HEART BEAT NOT

          MENTIONED    NO .

           

      RESPIRATORY:

           

          SHORTNESS OF BREATH ON EXERTION, WHEEZES, UNUSUAL COUGH NOT

          MENTIONED    NO .

           

      ENDOCRINOLOGY:

           

          ADRENAL GLAND OR THYROID DISORDERS NOT MENTIONED    NO . UNUSUAL

          URINATION, DIZZINESS OR LETHARGY NOT MENTIONED    NO .

           

VITAL SIGNS

           

          .0 LBS, HT 64 IN, BMI 38.79 INDEX, /74 MM HG, HR 92

          /MIN, RR 18 /MIN, TEMP 97.7 F, OXYGEN SAT % 98%, SAFE IN ENV?

          (Y/N) YES, NA INITIALS AW [id number] ASUMADU LPN.

           

EXAMINATION

           

      GENERAL EXAMINATION:

          GENERALNO ACUTE DISTRESS, WELL NOURISHED AND HYDRATED.

           

          PSYCHAPPROPRIATE MOOD AND AFFECT .

           

          LUNGS:CLEAR TO AUSCULTATION BILATERALLY, NO WHEEZES, RHONCHI,

          RALES.

           

          HEART:NO MURMURS, REGULAR RATE AND RHYTHM.

           

          BACK:POINT TENDER BILATERAL LOW BACK, SURROUNDING SKIN SHOWS NO

          ERYTHEMA, ECCHYMOSIS, INCREASED WARMTH, AND/OR SKIN ERUPTIONS

          NOTED. BANDS OF RESTRICTIVE TISSUE NOTED OVER TRIGGER POINTS .

           

ASSESSMENTS

           

          MYALGIA, OTHER SITE - M79.18 (PRIMARY)

           

TREATMENT

           

      MYALGIA, OTHER SITE

          START IBUPROFEN TABLET, 800 MG, 1 TABLET WITH FOOD OR MILK AS

          NEEDED, ORALLY, THREE TIMES A DAY, 30 DAYS, 90 TABLET

          NOTES: BILATERAL LOW BACK TPI

           

          TPI INFO GIVEN VANCE JAUREGUI-TRUONG.

          CLINICAL NOTES:

           

          30-YEAR-OLD FEMALE IN FOR INITIAL PAIN CONSULT. GIVEN PRESENTING

          SYMPTOMS AND RESULTS OF PHYSICAL EXAMINATION RECOMMENDED TPI OF

          THE LOW BACK BILATERALLY WITH POST PROCEDURAL FOLLOW-UP. PATIENT

          HAS EXPRESSED UNDERSTANDING OF AND WAS IN AGREEMENT WITH

          TREATMENT PLAN. GIVEN TIME TO ASK QUESTIONS AND EXPRESS CONCERNS.

           

          .

           

PROCEDURE CODES

           

           ESTABILISHED PATIENT [facility] CHARGE

           

DISPOSITION & COMMUNICATION

           

FOLLOW UP

           

          POSTPROCEDURE (REASON: BILATERAL LOW BACK TPI)

           

 

ELECTRONICALLY SIGNED BY JUVENTINO BAUER ON

          07/15/2020 AT 08:36 AM EDT

           

           

           

 

DISCLAIMER :

THIS IS A VISIT SUMMARY EXTRACTED FROM THE [employer] CHART.

IT IS NOT A COPY OF THE [employer] PROGRESS NOTE.

RAMÓN

## 2020-08-04 ENCOUNTER — HOSPITAL ENCOUNTER (OUTPATIENT)
Dept: HOSPITAL 53 - M PAIN | Age: 30
End: 2020-08-04
Attending: ANESTHESIOLOGY
Payer: COMMERCIAL

## 2020-08-04 DIAGNOSIS — M79.18: Primary | ICD-10-CM

## 2020-08-10 ENCOUNTER — HOSPITAL ENCOUNTER (OUTPATIENT)
Dept: HOSPITAL 53 - M LABSMTC | Age: 30
End: 2020-08-10
Attending: ANESTHESIOLOGY
Payer: COMMERCIAL

## 2020-08-10 DIAGNOSIS — Z01.812: Primary | ICD-10-CM

## 2020-08-10 DIAGNOSIS — Z20.828: ICD-10-CM

## 2020-08-19 ENCOUNTER — HOSPITAL ENCOUNTER (OUTPATIENT)
Dept: HOSPITAL 53 - M LABSMTC | Age: 30
End: 2020-08-19
Attending: NURSE PRACTITIONER
Payer: COMMERCIAL

## 2020-08-19 DIAGNOSIS — E55.9: ICD-10-CM

## 2020-08-19 DIAGNOSIS — F33.0: Primary | ICD-10-CM

## 2020-08-19 DIAGNOSIS — F63.81: ICD-10-CM

## 2020-08-19 LAB
25(OH)D3 SERPL-MCNC: 36.4 NG/ML (ref 30–100)
FT4I SERPL CALC-MCNC: 1.9 % (ref 1.3–4.8)
T3RU NFR SERPL: 30 % (ref 30–39)
T4 SERPL-MCNC: 6.3 UG/DL (ref 4.5–12)
TSH SERPL DL<=0.005 MIU/L-ACNC: 1.32 UIU/ML (ref 0.36–3.74)

## 2020-08-25 ENCOUNTER — HOSPITAL ENCOUNTER (OUTPATIENT)
Dept: HOSPITAL 53 - M PAIN | Age: 30
End: 2020-08-25
Attending: NURSE PRACTITIONER
Payer: COMMERCIAL

## 2020-08-25 DIAGNOSIS — M79.18: Primary | ICD-10-CM

## 2020-09-25 ENCOUNTER — HOSPITAL ENCOUNTER (OUTPATIENT)
Dept: HOSPITAL 53 - M PAIN | Age: 30
End: 2020-09-25
Attending: NURSE PRACTITIONER
Payer: COMMERCIAL

## 2020-09-25 DIAGNOSIS — M79.18: Primary | ICD-10-CM

## 2020-10-02 ENCOUNTER — HOSPITAL ENCOUNTER (OUTPATIENT)
Dept: HOSPITAL 53 - M LABSMTC | Age: 30
End: 2020-10-02
Attending: ANESTHESIOLOGY
Payer: COMMERCIAL

## 2020-10-02 DIAGNOSIS — Z20.828: Primary | ICD-10-CM

## 2020-10-07 ENCOUNTER — HOSPITAL ENCOUNTER (OUTPATIENT)
Dept: HOSPITAL 53 - M PAIN | Age: 30
End: 2020-10-07
Attending: ANESTHESIOLOGY
Payer: COMMERCIAL

## 2020-10-07 DIAGNOSIS — J45.909: ICD-10-CM

## 2020-10-07 DIAGNOSIS — F17.210: ICD-10-CM

## 2020-10-07 DIAGNOSIS — M79.18: Primary | ICD-10-CM

## 2020-10-07 DIAGNOSIS — F41.9: ICD-10-CM

## 2020-10-07 DIAGNOSIS — F32.9: ICD-10-CM

## 2020-10-07 DIAGNOSIS — G47.00: ICD-10-CM

## 2020-10-07 DIAGNOSIS — Z79.899: ICD-10-CM

## 2020-10-07 PROCEDURE — 20553 NJX 1/MLT TRIGGER POINTS 3/>: CPT

## 2020-10-12 NOTE — ECWPNPC
PATIENT NAME: ISAIAH RUSSELL

: 1990

GENDER: FEMALE

MRN: [id number]

VISIT DATE: 10/07/2020

DISCHARGE DATE: 10/07/20 1528

VISIT LOCKED DATE TIME: 

PHYSICIAN: EYAD CHILDS MD

RESOURCE: EYAD CHILDS MD

 

           

           

REASON FOR APPOINTMENT

           

          1. [place] THORACIC/LOW BACK TPI

           

HISTORY OF PRESENT ILLNESS

           

      PAIN CENTER INTAKE QUESTIONS:

      DO YOU HAVE A HISTORY OF MRSA?

           

           

          :NO

           

      DO YOU TAKE A BLOOD THINNERS?

           

           

          :NO

           

      DO YOU HAVE ANY BLEEDING DISORDERS?

           

           

          :NO

           

      ANY NEW NUMBNESS OR WEAKNESS IN YOUR LEGS OR ARMS?

           

           

          :NO

           

      ANY PACEMAKER,DEFIBRILLATOR, OR DORSAL COLUMN

          STIMULATOR?

           

           

          :NO

           

      DO YOU HAVE ANY RASHES OR OPEN SORES?

           

           

          :NO

           

      ARE YOU ALLERGIC TO IV DYE?

           

           

          :NO

           

      ARE YOU DIABETIC?

           

           

          :NO

           

      ANY NEW PROBLEMS WITH YOUR MEDICATIONS?

           

           

          :NO

           

      HAVE YOU RECEIVED A VACCINE IN THE PAST 30 DAYS?

           

           

          :NO

           

      DO YOU PLAN TO RECEIVE A VACCINE IN THE NEXT 21

          DAYS?

           

           

          :NO

           

      DO YOU TAKE ANY IMMUNOSUPPRESSIVE MEDICATIONS?

           

           

          :NO

           

      ANY HISTORY OF SEIZURES?

           

           

          :NO

           

      ANY HISTORY OF CARDIAC ISSUES OR EVENTS?

           

           

          :NO

           

      DO YOU HAVE SLEEP APNEA?

           

           

          :YES

          DO YOU WEAR A CPAP?YES

           

      ANY RECENT HEAD INJURY?

           

           

          :NO

           

      DO YOU HAVE ANY NEW INFECTIONS?

           

           

          :NO

           

      IS THERE A CHANCE YOU COULD BE PREGNANT?

           

           

          :NO

           

      ARE YOU BREAST FEEDING?

           

           

          :NO

           

      WHEN DID YOU LAST EAT?

           

           

          : 10/06/2020 2230

           

      WHEN DID YOU LAST DRINK?

           

           

          : 10/07/2020 1230

           

      WHAT DID YOU LAST DRINK?

           

           

          : WATER

           

      NAME OF PERSON DRIVING YOU HOME?

           

           

          : -[employer]

           

      DO YOU HAVE ANY OTHER QUESTIONS OR CONCERNS?

           

           

          : -

           

      GENERAL:

           -.

           

      FALL RISK SCREENING:

      SCREENING

           

           

          :NO FALLS REPORTED IN THE LAST YEAR

           

      PAIN SCREENING:

      PATIENT HAS A COMPLAINT OF ACUTE OR CHRONIC PAIN

           

           

          :YES

          LOCATION OF PAIN:MID BACK, LOW BACK

          INTENSITY OF PAIN (SCALE OF 1 TO 10):8

          WHAT DOES YOUR PAIN FEEL LIKE:BURNING, CONTINOUS, SHARP

          DURATION:CONSTANT

          PAIN IS INCREASED BY:OTHERS WALKING

          PAIN IS DECREASED BY:OTHERS NOTHING REALLY HELPS

           

      NURSING NOTE:

           -.

           

CURRENT MEDICATIONS

           

          TAKING TRAZODONE  MG TABLET 1 TABLET AT BEDTIME AS NEEDED

          ORALLY ONCE A DAY, NOTES: 10/06/2020 2130

          TAKING CLARITIN 10 MG TABLET 1 TABLET ORALLY ONCE A DAY, NOTES:

          10/07/2020 0900

          TAKING BACLOFEN 10 MG TABLET 1 TABLET WITH FOOD OR MILK ORALLY

          THREE TIMES A DAY, NOTES: 10/07/2020 0900

          NOT-TAKING IBUPROFEN 800 MG TABLET 1 TABLET WITH FOOD OR MILK AS

          NEEDED ORALLY THREE TIMES A DAY

          NOT-TAKING ESCITALOPRAM OXALATE 20 MG TABLET 0.5 TABLET ORALLY

          ONCE A DAY, NOTES: [facility]

          NOT-TAKING HYDROXYZINE HCL 50 MG TABLET 1 TABLET AS NEEDED ORALLY

          TID AS NEEDED, NOTES: [facility]

          MEDICATION LIST REVIEWED AND RECONCILED WITH THE PATIENT

           

PAST MEDICAL HISTORY

           

          DEPRESSION/ANXIETY

          INSOMNIA

          ASTHMA

           

ALLERGIES

           

          SEASONALE

           

SURGICAL HISTORY

           

          TUBAL PREGNANCY- SYR 2016

           

FAMILY HISTORY

           

          FATHER: 

          MOTHER: 

          PATERNAL GRAND FATHER: UNKNOWN

          PATERNAL GRAND MOTHER: UNKNOWN

          MATERNAL GRAND FATHER: UNKNOWN

          MATERNAL GRAND MOTHER: UNKNOWN

          MOTHER PASSED AWAY- UNKNOWN MEDICAL ISSUES. LOST CONTACT WITH

          FATHER.

           

SOCIAL HISTORY

           

          GENERAL:

           

          TOBACCO USE

          ARE YOU A:CURRENT SMOKER

          ARE YOU INTERESTED IN QUITTING?NOT READY TO QUIT

          COUNSELED THE PATIENT ON SMOKING EFFECTS, EDUCATION

          [id number]/2020

          HOW MANY CIGARETTES A DAY DO YOU SMOKE?6-10

          HOW SOON AFTER YOU WAKE UP DO YOU SMOKE YOUR FIRST

          CIGARETTE?AFTER 60 MIN

          HOW OFTEN DO YOU SMOKE CIGARETTES?EVERY DAY

          PATIENT COUNSELED ON THE DANGERS OF TOBACCO USE AND URGED TO

          QUIT:10/06/2020

          SMOKING CESSATION INFORMATION GIVEN2020

           

           

          LATEX QUESTIONNAIRE

          LATEX ALLERGY : HAVE YOU EVER DEVELOPED ANY TYPE OF REACTION

          AFTER HANDLING LATEX PRODUCTS SUCH AS RUBBER GLOVES, CONDOMS,

          DIAPHRAGMS, BALLOONS, SOCKS, OR UNDERWEAR?NO

          LATEX ALLERGY : HAVE YOU EVER DEVELOPED ANY TYPE OF REACTION

          DURING OR AFTER DENTAL APPOINTMENT, VAGINAL/RECTAL EXAMINATION,

          SURGICAL PROCEDURE, OR ANY OTHER EXPOSURE?NO

          LATEX RISK : HAVE YOU EVER HAD ANY DIFFICULTY BREATHING OR HIVES

          AFTER EATING OR HANDLING ANY FRUITS, OR VEGETABLES; SUCH AS KIWI,

          BANANAS, STONE FRUITS, OR CHESTNUTSNO

          LATEX RISK : DO YOU HAVE A PREVIOUS PERSONAL HISTORY OF MORE THAN

          NINE SURGERIES, SPINA BIFIDA, OR REPEATED CATHERIZATIONS? NO

          LATEX RISK : ARE YOU FREQUENTLY EXPOSED TO LATEX PRODUCTS IN YOUR

          OCCUPATION?NO

          DATE ASKED : 10/07/2020

           

           

          ALCOHOL SCREENING

          DID YOU HAVE A DRINK CONTAINING ALCOHOL IN THE PAST YEAR?NO

          POINTS0

          INTERPRETATIONNEGATIVE

           

           

          RECREATIONAL DRUG USE

          DRUG USE?NO

           

           

          CAFFEINE

          CAFFEINE USE?YES 2 SODAS DAILY

           

           

          SEXUAL HX

          HAD SEX IN THE LAST 12 MONTHS (VAGINAL, ORAL, OR ANAL)?YES

          WITHMEN ONLY

          PREVENTION STRATEGIES DISCUSSED:OTHER

          USE PROTECTION?YES

          HOW OFTEN?SOME OF THE TIME

          LMP:3/19/18

          HAVE YOU EVER HAD AN STD?NO

           

           

          HIV / HEP-C SCREENING

          HIV TEST OFFERED TO PATIENT:YES

          DATE OFFERED:2018

          TEST ACCEPTED:YES

          BROCHURE PROVIDED TO PATIENTYES

           

           

          [employer]

          [id number] NONE

           

           

          LANGUAGE

          LANGUAGES SPOKEN:ENGLISH

           

           

          EDUCATION

          LEVEL OF EDUCATION:HIGH SCHOOL

           

           

          LEARNING BARRIERS / SPECIAL NEEDS

          HEARING IMPAIRED?NO

          VISION IMPAIRED?YES

          COGNITIVELY IMPAIRED?YES

          :CORRECTIVE LENSES

          :MENTAL RETARDATION

          READINESS TO LEARN?YES

          LEARNING PREFERENCES?YES

          LEARNING CAPABILITIES PRESENT?YES

           

           

          DOMESTIC VIOLENCE

          STATUS:SINGLE

          DO YOU FEEL SAFE IN YOUR ENVIRONMENT?YES

           

           

          OCCUPATION: DIABLED- [employer].

           

           

          MARITAL STATUS: SINGLE.

           

           

          OTHERS AT HOME: EX PARTNER, NOW ROOM MATES.

           

           

          PAIN CLINIC PFS, CLERGY, PUBLIC HEALTH REFERRALS

          PFS REFERRAL NEEDED?NO

          CLERGY REFERRAL NEEDED?NO

          PUBLIC HEALTH REFERRAL NEEDED?NO

          WAS THE PROVIDER NOTIFIED OF ANY PERTINENT INFO?YES

          HAS THE PATIENT BEEN EDUCATED REGARDING HIS/HER PLAN OF CARE?YES

          HAS THE PATIENT BEEN EDUCATED REGARDING PAIN, THE RISK FOR PAIN,

          THE IMPORTANCE OF EFFECTIVE PAIN MANAGEMENT, AND THE PAIN

          ASSESSMENT PROCESS?YES

           

           

          ADVANCE DIRECTIVE

          ADVANCE DIRECTIVE DISCUSSED WITH PATIENT:YES DOES NOT HAVE

          ADVANCED DIRECTIVES AND DOES NOT NEED INFORMATION

           

HOSPITALIZATION/MAJOR DIAGNOSTIC PROCEDURE

           

          DENIES PAST HOSPITALIZATION

           

VITAL SIGNS

           

           LBS, HT 64 IN, BMI 38.79 INDEX, /80 MM HG, 

          /MIN, RR 18 /MIN, TEMP 97.8 F, OXYGEN SAT % 98%, SAFE IN ENV?

          (Y/N) YES, NA INITIALS SC 14:27, REVIEWED BY: DIANE.

           

EXAMINATION

           

      GENERAL EXAMINATION: THE PATIENT IS ALERT, ORIENTED

          TIMES THREE AND COOPERATIVE. HEART SHOWS REGULAR RHYTHM, NO

          MURMURS AND NO GALLOPS. LUNGS ARE CLEAR TO AUSCULTATION.

           

ASSESSMENTS

           

          MYALGIA, OTHER SITE - M79.18 (PRIMARY)

           

TREATMENT

           

      OTHERS

          CLINICAL NOTES: 10/6/20 @ 1300 PAT DONE JAZMINE CARRANZA RN BSN.

           

PROCEDURES

           

      PAIN NURSING RECORD

          PRE-PROCEDURE    IV SITE N/A

          PROCEDURE    IN ROOM 1416, PHYSICIAN IN ROOM 1509, START 1512,

          FINISH 1515, PHYSICIAN OUT OF ROOM 1516, OUT OF ROOM 1530,

          STEROID KENALOG 40 MG, O2 RA, ECG N/A, PATIENT SHIELDED NO,

          SAFETY STRAP NO, PREP ALCOHOL BY DR CHILDS, IV INFUSED N/A,

          DRESSING TEGADERM BY LESLEY CARRENO RN

          LOC:    10/07/2020 1511 LESLEY CARRENO RN , 1. ALERT, ORIENTED

          RESP:    10/07/2020 1511 LESLEY CARRENO RN , 1. REGULAR, NO DYSPNEA

          COLOR:    10/07/2020 1511 N WAI RN , 1. PINK

          SKIN:    10/07/2020 1511 N ELISABET CARRENO , 1. WARM, DRY

          POSITION:    10/07/2020 1511 N ELISABET CARRENO , 4. OTHER- SITTING

          POSITION

          VITALS:    10/07/2020 1526 HOLLY SERNA- 122/12-05-%

          DISCHARGE:    POST PAIN 6/10 BILATERAL THORAIC AND BIALTERAL LOW

          BACK, DRESSING SITE DRY AND INTACT, IV N/A, GAIT STEADY, TEACHING

          COMPLETED, PATIENT ACKNOWLEDGES UNDERSTANDING YES PATIENT

          VERBLAIZES UNDERSTANDING OF DISCHARGE INSTRUCTIONS REVIEWED,

          PATIENT DISCHARGED AT 1530

      PN TRIGGER POINT INJECTION WITH STEROIDS

          PRE PROCEDURE DIAGNOSIS    1. MYALGIA 2. PAIN AT BILATERAL

          THORACIC AREA AND BILATERAL LOWER BACK AREA

          POST PROCEDURE DIAGNOSIS    1. MYALGIA 2. PAIN AT BILATERAL

          THORACIC AREA AND BILATERAL LOWER BACK AREA

          PROCEDURE    TRIGGER POINT INJECTION AT BILATERAL THORACIC AREA

          AND BILATERAL LOWER BACK AREA

          SURGEON    DR. EYAD CHILDS

          ASSISTANT    NONE

          ANESTHESIA    LOCAL

          PRE PROCEDURE NOTE    THE PATIENT HAS A HISTORY OF CHRONIC PAIN

          AT THE RIGHT AND LEFT THORACIC AREA AND RIGHT AND LEFT LOWER BACK

          AREA. I EVALUATED THE PATIENT AND REVIEWED THE CHART. THERE IS

          EVIDENCE OF BANDS OF TISSUE WITH RESTRICTION OF MOVEMENT AND

          PRESENCE OF TRIGGER POINT AT THE RIGHT AND LEFT THORACIC AREA AND

          RIGHT AND LEFT LOWER BACK AREA. I WENT OVER THE RISKS,

          ALTERNATIVES, AND BENEFITS ASSOCIATED WITH THIS PROCEDURE. I

          DISCUSSED THAT THE USE OF STEROIDS MAY CONTRIBUTE TO

          IMMUNOSUPPRESSION OF THE PATIENT'S BODY AGAINST INFECTIONS SUCH

          AS COVID-19. THE PATIENT IS AWARE OF THE POTENTIAL COMPLICATIONS

          ASSOCIATED WITH THIS VIRUS, INCLUDING, BUT NOT LIMITED TO, DEATH.

          THE PATIENT WOULD LIKE TO PROCEED AND GIVE CONSENT TO PERFORMED

          THE PROCEDURE. THE PATIENT DENIES UNEXPLAINABLE WEIGHT LOSS,

          FEVER, CHILLS, OR NEW CHANGES IN URINARY OR BOWEL CONTROL. THE

          PATIENT IS COVID-19 NEGATIVE

          DESCRIPTION OF PROCEDURE    THE PATIENT WAS BROUGHT TO THE

          PROCEDURE ROOM AND PLACED IN THE SITTING POSITION. THE AREA WAS

          CLEANED WITH ALCOHOL. THE PROCEDURE WAS DONE USING ASEPTIC

          STERILE TECHNIQUE. A TIMEOUT WAS PERFORMED WHERE LATERALITY AND

          THE SITE OF THE PROCEDURE WERE CHECKED AND CONFIRMED WITH

          EVERYONE IN THE ROOM. USING A 25-GAUGE NEEDLE, TRIGGER POINTS

          WERE INJECTED AT THE RIGHT AND LEFT THORACIC AREA AND RIGHT AND

          LEFT LOWER BACK AREA WITH A TOTAL OF 40 ML OF BUPIVACAINE 0.25%

          AND KENALOG 40 MG. THE MEDICATIONS WERE VERIFIED WITH THE NURSE.

          THERE WAS NO EVIDENCE OF BLOOD OR PARESTHESIA DURING THE

          PROCEDURE. THE PATIENT WAS SENT TO THE RECOVERY ROOM. THE PATIENT

          WAS MOVING THE EXTREMITIES AND DOING WELL. THERE WERE NO

          COMPLICATIONS DURING THE PROCEDURE. ESTIMATED BLOOD LOSS WAS LESS

          THAN 5 ML

          POST PROCEDURE NOTE    THE PROCEDURE DONE WAS DISCUSSED WITH THE

          PATIENT. THE PATIENT WILL BE SEEN IN A FOLLOW UP IN THE NEXT FEW

          WEEKS. I AM LOOKING FOR LONG LASTING PAIN RELIEF FOR THE PATIENT

          WITH THIS INTERVENTION. INSTRUCTIONS WERE GIVEN, QUESTIONS WERE

          ANSWERED, AND THE PATIENT EXPRESSED UNDERSTANDING AND AGREES WITH

          THE PLAN. I, ESTER KNIGHT, DOCUMENTED THE ABOVE INFORMATION

          ACTING AS A SCRIBE FOR DR. CHILDS. I HAVE REVIEWED THE ABOVE

          DOCUMENT, WRITTEN BY ESTER KNIGHT, MEDICAL SCRIBE, AND I

          VERIFY THAT IT IS ACCURATE

           

PROCEDURE CODES

           

          50927 INJECT TRIGGER POINTS 3/>

           

DISPOSITION & COMMUNICATION

           

FOLLOW UP

           

          FOLLOW UP WITH NP (REASON: POST TPI BILATERAL THORACIC AND LOW

          BACK)

           

 

ELECTRONICALLY SIGNED BY EYAD CHILDS MD, MD ON

          10/12/2020 AT 01:40 PM EDT

           

           

           

 

DISCLAIMER :

THIS IS A VISIT SUMMARY EXTRACTED FROM THE [employer] CHART.

IT IS NOT A COPY OF THE [employer] PROGRESS NOTE.

RAMÓN

## 2020-11-05 ENCOUNTER — HOSPITAL ENCOUNTER (OUTPATIENT)
Dept: HOSPITAL 53 - M PAIN | Age: 30
End: 2020-11-05
Attending: NURSE PRACTITIONER
Payer: COMMERCIAL

## 2020-11-05 DIAGNOSIS — Z79.899: ICD-10-CM

## 2020-11-05 DIAGNOSIS — F17.210: ICD-10-CM

## 2020-11-05 DIAGNOSIS — G89.28: Primary | ICD-10-CM

## 2020-11-05 DIAGNOSIS — J45.909: ICD-10-CM

## 2020-11-05 DIAGNOSIS — G47.00: ICD-10-CM

## 2020-11-05 DIAGNOSIS — F41.9: ICD-10-CM

## 2020-11-05 DIAGNOSIS — F32.9: ICD-10-CM

## 2020-11-05 DIAGNOSIS — M79.18: ICD-10-CM

## 2020-11-10 NOTE — ECWPNPC
PATIENT NAME: ISAIAH RUSSELL

: 1990

GENDER: FEMALE

MRN: [id number]

VISIT DATE: 2020

DISCHARGE DATE: 20 1505

VISIT LOCKED DATE TIME: 

PHYSICIAN: CHARLEY GUTIÉRREZ 

RESOURCE: CHARLEY GUTIÉRREZ 

 

           

           

REASON FOR APPOINTMENT

           

          1. POST [place] THORACIC/LOW BACK TPI

           

HISTORY OF PRESENT ILLNESS

           

      GENERAL:

           - 30-YEAR-OLD FEMALE IN FOR POST TPI FOLLOW-UP. SHE RATES HER

          PAIN PREPROCEDURE AT A 10 OUT OF 10 AND POSTPROCEDURE AT A 2-3

          OUT OF 10 TIMES ONE MONTH. SHE RATES HER PAIN CURRENTLY AT A 5

          OUT OF 10 AND DESCRIBES IT AS ACHING, AND THROBBING. SHE FEELS

          HER MEDICATIONS ARE HELPFUL AND DENIES MED SIDE EFFECTS AT THIS

          TIME.

           

      FALL RISK SCREENING:

      SCREENING

           

           

          :NO FALLS REPORTED IN THE LAST YEAR

           

      PAIN SCREENING:

      PATIENT HAS A COMPLAINT OF ACUTE OR CHRONIC PAIN

           

           

          :YES

          LOCATION OF PAIN:UPPER BACK, LOW BACK

          INTENSITY OF PAIN (SCALE OF 1 TO 10):5

          WHAT DOES YOUR PAIN FEEL LIKE:ACHING, THROBBING

          DURATION:INTERMITTENT

          PAIN IS INCREASED BY:ACTIVITIES

          PAIN IS DECREASED BY:OTHERS LAYING DOWN

          TREATMENT/MEDICATIONS USED TO MANAGE PAIN:OPIOIDS BACLOFEN

          LEVEL OF RELIEF FROM PAIN TREATMENTS IN THE PAST:75%

          PAIN HAS INTERFERED WITH THE FOLLOWING:BATHING/DRESSING, WALKING

          ABILITY, HOUSEWORK, SLEEP, TRANSPORTATION, TOILETING

           

      NURSING NOTE:

           -.

           

      PAIN CENTER INTAKE QUESTIONS:

      DO YOU HAVE A HISTORY OF MRSA?

           

           

          :NO

           

      DO YOU TAKE A BLOOD THINNERS?

           

           

          :NO

           

      DO YOU HAVE ANY BLEEDING DISORDERS?

           

           

          :NO

           

      ANY NEW NUMBNESS OR WEAKNESS IN YOUR LEGS OR ARMS?

           

           

          :NO

           

      ANY PACEMAKER,DEFIBRILLATOR, OR DORSAL COLUMN

          STIMULATOR?

           

           

          :NO

           

      DO YOU HAVE ANY RASHES OR OPEN SORES?

           

           

          :NO

           

      ARE YOU ALLERGIC TO IV DYE?

           

           

          :NO

           

      ARE YOU DIABETIC?

           

           

          :NO

           

      ANY NEW PROBLEMS WITH YOUR MEDICATIONS?

           

           

          :NO

           

      HAVE YOU RECEIVED A VACCINE IN THE PAST 30 DAYS?

           

           

          :NO

           

      DO YOU PLAN TO RECEIVE A VACCINE IN THE NEXT 21

          DAYS?

           

           

          :YES

          IF SO WHAT VACCINE AND WHEN? FLU VACCINE

           

      DO YOU NEED ANY PRESCRIPTION?

           

           

          :NO

           

      DO YOU TAKE ANY IMMUNOSUPPRESSIVE MEDICATIONS?

           

           

          :NO

           

      IS THERE A CHANCE YOU COULD BE PREGNANT?

           

           

          :NO

           

      ARE YOU BREAST FEEDING?

           

           

          :NO

           

CURRENT MEDICATIONS

           

          TAKING TRAZODONE  MG TABLET 1 TABLET AT BEDTIME AS NEEDED

          ORALLY ONCE A DAY

          TAKING CLARITIN 10 MG TABLET 1 TABLET ORALLY ONCE A DAY

          TAKING BACLOFEN 10 MG TABLET 1 TABLET WITH FOOD OR MILK ORALLY

          THREE TIMES A DAY

          TAKING SEROQUEL 100 MG TABLET 1 TABLET AT BEDTIME ORALLY ONCE A

          DAY

          NOT-TAKING IBUPROFEN 800 MG TABLET 1 TABLET WITH FOOD OR MILK AS

          NEEDED ORALLY THREE TIMES A DAY

          NOT-TAKING ESCITALOPRAM OXALATE 20 MG TABLET 0.5 TABLET ORALLY

          ONCE A DAY, NOTES: [facility]

          NOT-TAKING HYDROXYZINE HCL 50 MG TABLET 1 TABLET AS NEEDED ORALLY

          TID AS NEEDED, NOTES: [facility]

          MEDICATION LIST REVIEWED AND RECONCILED WITH THE PATIENT

           

PAST MEDICAL HISTORY

           

          DEPRESSION/ANXIETY

          INSOMNIA

          ASTHMA

           

ALLERGIES

           

          SEASONALE

           

SURGICAL HISTORY

           

          TUBAL PREGNANCY- SYR 2016

           

FAMILY HISTORY

           

          FATHER: 

          MOTHER: 

          PATERNAL GRAND FATHER: UNKNOWN

          PATERNAL GRAND MOTHER: UNKNOWN

          MATERNAL GRAND FATHER: UNKNOWN

          MATERNAL GRAND MOTHER: UNKNOWN

          MOTHER PASSED AWAY- UNKNOWN MEDICAL ISSUES. LOST CONTACT WITH

          FATHER.

           

SOCIAL HISTORY

           

          GENERAL:

           

          TOBACCO USE

          ARE YOU A:CURRENT SMOKER

          ARE YOU INTERESTED IN QUITTING?NOT READY TO QUIT

          COUNSELED THE PATIENT ON SMOKING EFFECTS, EDUCATION

          [id number]/05/2020

          HOW MANY CIGARETTES A DAY DO YOU SMOKE?6-10

          HOW SOON AFTER YOU WAKE UP DO YOU SMOKE YOUR FIRST

          CIGARETTE?AFTER 60 MIN

          HOW OFTEN DO YOU SMOKE CIGARETTES?EVERY DAY

          PATIENT COUNSELED ON THE DANGERS OF TOBACCO USE AND URGED TO

          QUIT:10/06/2020

          SMOKING CESSATION INFORMATION GIVEN2020

           

           

          LATEX QUESTIONNAIRE

          LATEX ALLERGY : HAVE YOU EVER DEVELOPED ANY TYPE OF REACTION

          AFTER HANDLING LATEX PRODUCTS SUCH AS RUBBER GLOVES, CONDOMS,

          DIAPHRAGMS, BALLOONS, SOCKS, OR UNDERWEAR?NO

          LATEX ALLERGY : HAVE YOU EVER DEVELOPED ANY TYPE OF REACTION

          DURING OR AFTER DENTAL APPOINTMENT, VAGINAL/RECTAL EXAMINATION,

          SURGICAL PROCEDURE, OR ANY OTHER EXPOSURE?NO

          LATEX RISK : HAVE YOU EVER HAD ANY DIFFICULTY BREATHING OR HIVES

          AFTER EATING OR HANDLING ANY FRUITS, OR VEGETABLES; SUCH AS KIWI,

          BANANAS, STONE FRUITS, OR CHESTNUTSNO

          LATEX RISK : DO YOU HAVE A PREVIOUS PERSONAL HISTORY OF MORE THAN

          NINE SURGERIES, SPINA BIFIDA, OR REPEATED CATHERIZATIONS? NO

          LATEX RISK : ARE YOU FREQUENTLY EXPOSED TO LATEX PRODUCTS IN YOUR

          OCCUPATION?NO

          DATE ASKED : 10/07/2020

           

           

          ALCOHOL SCREENING

          DID YOU HAVE A DRINK CONTAINING ALCOHOL IN THE PAST YEAR?NO

          POINTS0

          INTERPRETATIONNEGATIVE

           

           

          RECREATIONAL DRUG USE

          DRUG USE?NO

           

           

          CAFFEINE

          CAFFEINE USE?YES 2 SODAS DAILY

           

           

          SEXUAL HX

          HAD SEX IN THE LAST 12 MONTHS (VAGINAL, ORAL, OR ANAL)?YES

          WITHMEN ONLY

          PREVENTION STRATEGIES DISCUSSED:OTHER

          USE PROTECTION?YES

          HOW OFTEN?SOME OF THE TIME

          LMP:3/19/18

          HAVE YOU EVER HAD AN STD?NO

           

           

          HIV / HEP-C SCREENING

          HIV TEST OFFERED TO PATIENT:YES

          DATE OFFERED:2018

          TEST ACCEPTED:YES

          BROCHURE PROVIDED TO PATIENTYES

           

           

          Buddhism

          [id number] NONE

           

           

          LANGUAGE

          LANGUAGES SPOKEN:ENGLISH

           

           

          EDUCATION

          LEVEL OF EDUCATION:HIGH SCHOOL

           

           

          LEARNING BARRIERS / SPECIAL NEEDS

          HEARING IMPAIRED?NO

          VISION IMPAIRED?YES

          COGNITIVELY IMPAIRED?YES

          :CORRECTIVE LENSES

          :MENTAL RETARDATION

          READINESS TO LEARN?YES

          LEARNING PREFERENCES?YES

          LEARNING CAPABILITIES PRESENT?YES

           

           

          DOMESTIC VIOLENCE

          STATUS:SINGLE

          DO YOU FEEL SAFE IN YOUR ENVIRONMENT?YES

           

           

          OCCUPATION: [employer]- [employer].

           

           

          MARITAL STATUS: SINGLE.

           

           

          OTHERS AT HOME: EX PARTNER, NOW ROOM MATES.

           

           

          PAIN CLINIC PFS, CLERGY, PUBLIC HEALTH REFERRALS

          PFS REFERRAL NEEDED?NO

          CLERGY REFERRAL NEEDED?NO

          PUBLIC HEALTH REFERRAL NEEDED?NO

          WAS THE PROVIDER NOTIFIED OF ANY PERTINENT INFO?YES

          HAS THE PATIENT BEEN EDUCATED REGARDING HIS/HER PLAN OF CARE?YES

          HAS THE PATIENT BEEN EDUCATED REGARDING PAIN, THE RISK FOR PAIN,

          THE IMPORTANCE OF EFFECTIVE PAIN MANAGEMENT, AND THE PAIN

          ASSESSMENT PROCESS?YES

           

           

          ADVANCE DIRECTIVE

          ADVANCE DIRECTIVE DISCUSSED WITH PATIENT:YES DOES NOT HAVE

          ADVANCED DIRECTIVES AND DOES NOT NEED INFORMATION

           

HOSPITALIZATION/MAJOR DIAGNOSTIC PROCEDURE

           

          NO HOSPITALIZATION HISTORY.

           

REVIEW OF SYSTEMS

           

      CONSTITUTIONAL:

           

          ANY RECENT FEVER    NO . CHILLS    NO . WEIGHT CHANGE OF UNKNOWN

          REASONS    NO .

           

      GASTROENTEROLOGY:

           

          NEW UNEXPLAINABLE CHANGES IN BOWEL CONTROL    NO . CONSTIPATION  

           NO .

           

      GENITOURINARY:

           

          ANY NEW CHANGE IN BLADDER CONTROL?    NO .

           

      NEUROLOGY:

           

          NEW ONSET DIZZINESS OR NEUROLOGICAL CHANGES NOT MENTIONED    NO .

          NEW NUMBNESS OR PAIN PATTERNS NOT MENTIONED AND PERTINENT TO

          TODAY'S VISIT    NO .

           

      CARDIOLOGY:

           

          NEW CHEST PRESSURE    NO . NEW CHEST PAIN    NO .

           

      RESPIRATORY:

           

          UNEXPLAINABLE COUGH    NO . NEW SHORTNESS OF BREATH    NO .

           

VITAL SIGNS

           

          .4 LBS, HT 64 IN, BMI 39.03 INDEX, /78 MM HG, 

          /MIN, RR 18 /MIN, TEMP 97.5 F, OXYGEN SAT % 95%, NA INITIALS AW

          1437, REVIEWED BY: EM.

           

EXAMINATION

           

      GENERAL EXAMINATION:

          GENERALNO ACUTE DISTRESS, WELL NOURISHED AND HYDRATED.

           

          PSYCHAPPROPRIATE MOOD AND AFFECT .

           

          LUNGS:CLEAR TO AUSCULTATION BILATERALLY, NO WHEEZES, RHONCHI,

          RALES.

           

          HEART:NO MURMURS, REGULAR RATE AND RHYTHM.

           

          BACK:POINT TENDER BILATERAL THORACIC AND LOW BACK SURROUNDING

          SKIN SHOWS NO ERYTHEMA, ECCHYMOSIS, INCREASED WARMTH, AND/OR SKIN

          ERUPTIONS NOTED. BANDS OF RESTRICTED TISSUE NOTED OVER TRIGGER

          POINTS .

           

ASSESSMENTS

           

          OTHER CHRONIC POSTPROCEDURAL PAIN - G89.28 (PRIMARY)

           

          MYALGIA, OTHER SITE - M79.18

           

TREATMENT

           

      OTHER CHRONIC POSTPROCEDURAL PAIN

          PAIN PROCEDURE LOGDATE OF [id number]/7/20PROCEDURE:TRIGGER POINT

          INJECTION BILAT THORACIC AND LOW BACKAMOUNT OF PRE

          SEDATE0/0RESULT:PRE-9/10 POST 7-8/10

          NOTES: 30-YEAR-OLD FEMALE IN FOR POST TPI FOLLOW-UP. GIVEN

          PRESENTING SYMPTOMS AND RESULTS OF PHYSICAL EXAMINATION

          RECOMMENDED BILATERAL THORACIC AND LOW BACK TRIGGER POINT

          INJECTIONS WITH POST PROCEDURAL FOLLOW-UP. PATIENT EXPRESSED

          UNDERSTANDING OF AND WAS IN AGREEMENT WITH TREATMENT PLAN. GIVEN

          TIME TO ASK QUESTIONS AND EXPRESS CONCERNS.

           

          1458 REVIEWED INFORMATION ON TRIGGER POINT INJECTIONS PROCEDURE

          WITH PATIENT. ALSO REVIEWED PRE-PROCEDURE INSTRUCTIONS. PATIENT

          VERBALIZED AN UNDERSTANDING. MAYURI RAUSCH RN.

          CLINICAL NOTES: BILATERAL THORACIC AND LOW BACK TRIGGER POINT

          INJECTIONS.

           

           

      OTHERS

          CLINICAL NOTES: 10/6/20 @ 1300 WILTON CARRANZA RN BSN.

           

PROCEDURE CODES

           

           ESTABILISHED PATIENT [facility] CHARGE

           

DISPOSITION & COMMUNICATION

           

FOLLOW UP

           

          POSTPROCEDURE (REASON: BILATERAL THORACIC AND LOW BACK TRIGGER

          POINT INJECTIONS)

           

 

ELECTRONICALLY SIGNED BY JUVENTINO BAUER ON

          2020 AT 09:41 AM EST

           

           

           

 

DISCLAIMER :

THIS IS A VISIT SUMMARY EXTRACTED FROM THE [employer] CHART.

IT IS NOT A COPY OF THE [employer] PROGRESS NOTE.

RAMÓN

## 2020-11-18 ENCOUNTER — HOSPITAL ENCOUNTER (OUTPATIENT)
Dept: HOSPITAL 53 - M LABSMTC | Age: 30
End: 2020-11-18
Attending: ANESTHESIOLOGY
Payer: COMMERCIAL

## 2020-11-18 DIAGNOSIS — Z20.828: Primary | ICD-10-CM

## 2020-11-23 ENCOUNTER — HOSPITAL ENCOUNTER (OUTPATIENT)
Dept: HOSPITAL 53 - M PAIN | Age: 30
End: 2020-11-23
Attending: ANESTHESIOLOGY
Payer: COMMERCIAL

## 2020-11-23 DIAGNOSIS — F17.210: ICD-10-CM

## 2020-11-23 DIAGNOSIS — M79.18: Primary | ICD-10-CM

## 2020-11-23 DIAGNOSIS — F41.9: ICD-10-CM

## 2020-11-23 DIAGNOSIS — F32.9: ICD-10-CM

## 2020-11-23 DIAGNOSIS — G47.00: ICD-10-CM

## 2020-11-23 DIAGNOSIS — J45.909: ICD-10-CM

## 2020-11-23 DIAGNOSIS — Z79.899: ICD-10-CM

## 2020-11-23 PROCEDURE — 20553 NJX 1/MLT TRIGGER POINTS 3/>: CPT

## 2020-12-03 NOTE — ECWPNPC
PATIENT NAME: ISAIAH RUSSELL

: 1990

GENDER: FEMALE

MRN: [id number]

VISIT DATE: 2020

DISCHARGE DATE: 20

VISIT LOCKED DATE TIME: 

PHYSICIAN: EYAD CHILDS MD

RESOURCE: EYAD CHILDS MD

 

           

           

REASON FOR APPOINTMENT

           

          1. BILATERAL THORACIC AND LOW BACK TRIGGER POINT INJECTIONS

           

HISTORY OF PRESENT ILLNESS

           

      GENERAL:

           - -.

           

      FALL RISK SCREENING:

      SCREENING

           

           

          :NO FALLS REPORTED IN THE LAST YEAR

           

      PAIN SCREENING:

      PATIENT HAS A COMPLAINT OF ACUTE OR CHRONIC PAIN

           

           

          :YES

          LOCATION OF PAIN:UPPER BACK, MID BACK, LOW BACK

          INTENSITY OF PAIN (SCALE OF 1 TO 10):8

          WHAT DOES YOUR PAIN FEEL LIKE:ACHING, CONTINOUS, SHARP, STABBING,

          THROBBING

          DURATION:CONTINOUS

          PAIN IS INCREASED BY:ACTIVITIES, PROLONGED STANDING

          PAIN IS DECREASED BY:USE OF PAIN MEDICATIONS

           

      NURSING NOTE:

           - -.

           

      PAIN CENTER INTAKE QUESTIONS:

      DO YOU HAVE A HISTORY OF MRSA?

           

           

          :NO

           

      DO YOU TAKE A BLOOD THINNERS?

           

           

          :NO

           

      DO YOU HAVE ANY BLEEDING DISORDERS?

           

           

          :NO

           

      ANY NEW NUMBNESS OR WEAKNESS IN YOUR LEGS OR ARMS?

           

           

          :NO

           

      ANY PACEMAKER,DEFIBRILLATOR, OR DORSAL COLUMN

          STIMULATOR?

           

           

          :NO

           

      DO YOU HAVE ANY RASHES OR OPEN SORES?

           

           

          :NO

           

      ARE YOU ALLERGIC TO IV DYE?

           

           

          :NO

           

      ARE YOU DIABETIC?

           

           

          :NO

           

      ANY NEW PROBLEMS WITH YOUR MEDICATIONS?

           

           

          :NO

           

      HAVE YOU RECEIVED A VACCINE IN THE PAST 30 DAYS?

           

           

          :NO

           

      DO YOU PLAN TO RECEIVE A VACCINE IN THE NEXT 21

          DAYS?

           

           

          :NO

           

      DO YOU TAKE ANY IMMUNOSUPPRESSIVE MEDICATIONS?

           

           

          :NO

           

      ANY HISTORY OF SEIZURES?

           

           

          :NO

           

      ANY HISTORY OF CARDIAC ISSUES OR EVENTS?

           

           

          :NO

           

      DO YOU HAVE SLEEP APNEA?

           

           

          :YES

          DO YOU WEAR A CPAP?YES

           

      ANY RECENT HEAD INJURY?

           

           

          :NO

           

      DO YOU HAVE ANY NEW INFECTIONS?

           

           

          :NO

           

      IS THERE A CHANCE YOU COULD BE PREGNANT?

           

           

          :NO

           

      ARE YOU BREAST FEEDING?

           

           

          :NO

           

      WHEN DID YOU LAST EAT?

           

           

          : -2300

           

      WHEN DID YOU LAST DRINK?

           

           

          : -2330

           

      WHAT DID YOU LAST DRINK?

           

           

          : -WATER

           

      NAME OF PERSON DRIVING YOU HOME?

           

           

          : -ANGELY-VOLUNTEER TRANSPORTER

           

      DO YOU HAVE ANY OTHER QUESTIONS OR CONCERNS?

           

           

          : -

           

CURRENT MEDICATIONS

           

          TAKING TRAZODONE  MG TABLET 1 TABLET AT BEDTIME AS NEEDED

          ORALLY ONCE A DAY, NOTES: ONE IN PAST MONTH

          TAKING CLARITIN 10 MG TABLET 1 TABLET ORALLY ONCE A DAY, NOTES:

          

          TAKING BACLOFEN 10 MG TABLET 1 TABLET WITH FOOD OR MILK ORALLY

          THREE TIMES A DAY, NOTES: 2300

          TAKING SEROQUEL 100 MG TABLET 1 TABLET AT BEDTIME ORALLY ONCE A

          DAY, NOTES: 2300

          NOT-TAKING IBUPROFEN 800 MG TABLET 1 TABLET WITH FOOD OR MILK AS

          NEEDED ORALLY THREE TIMES A DAY

          NOT-TAKING ESCITALOPRAM OXALATE 20 MG TABLET 0.5 TABLET ORALLY

          ONCE A DAY, NOTES: [facility]

          NOT-TAKING HYDROXYZINE HCL 50 MG TABLET 1 TABLET AS NEEDED ORALLY

          TID AS NEEDED, NOTES: [facility]

          MEDICATION LIST REVIEWED AND RECONCILED WITH THE PATIENT

           

PAST MEDICAL HISTORY

           

          DEPRESSION/ANXIETY

          INSOMNIA

          ASTHMA

           

ALLERGIES

           

          SEASONALE

           

SURGICAL HISTORY

           

          TUBAL PREGNANCY- SYR 2016

           

FAMILY HISTORY

           

          FATHER: 

          MOTHER: 

          PATERNAL GRAND FATHER: UNKNOWN

          PATERNAL GRAND MOTHER: UNKNOWN

          MATERNAL GRAND FATHER: UNKNOWN

          MATERNAL GRAND MOTHER: UNKNOWN

          MOTHER PASSED AWAY- UNKNOWN MEDICAL ISSUES. LOST CONTACT WITH

          FATHER.

           

SOCIAL HISTORY

           

          GENERAL:

           

          TOBACCO USE

          ARE YOU A:CURRENT SMOKER

          ARE YOU INTERESTED IN QUITTING?NOT READY TO QUIT

          COUNSELED THE PATIENT ON SMOKING EFFECTS, EDUCATION

          [id number]/05/2020

          HOW MANY CIGARETTES A DAY DO YOU SMOKE?6-10

          HOW SOON AFTER YOU WAKE UP DO YOU SMOKE YOUR FIRST

          CIGARETTE?AFTER 60 MIN

          HOW OFTEN DO YOU SMOKE CIGARETTES?EVERY DAY

          PATIENT COUNSELED ON THE DANGERS OF TOBACCO USE AND URGED TO

          QUIT:10/06/2020

          SMOKING CESSATION INFORMATION GIVEN2020

           

           

          LATEX QUESTIONNAIRE

          LATEX ALLERGY : HAVE YOU EVER DEVELOPED ANY TYPE OF REACTION

          AFTER HANDLING LATEX PRODUCTS SUCH AS RUBBER GLOVES, CONDOMS,

          DIAPHRAGMS, BALLOONS, SOCKS, OR UNDERWEAR?NO

          LATEX ALLERGY : HAVE YOU EVER DEVELOPED ANY TYPE OF REACTION

          DURING OR AFTER DENTAL APPOINTMENT, VAGINAL/RECTAL EXAMINATION,

          SURGICAL PROCEDURE, OR ANY OTHER EXPOSURE?NO

          LATEX RISK : HAVE YOU EVER HAD ANY DIFFICULTY BREATHING OR HIVES

          AFTER EATING OR HANDLING ANY FRUITS, OR VEGETABLES; SUCH AS KIWI,

          BANANAS, STONE FRUITS, OR CHESTNUTSNO

          LATEX RISK : DO YOU HAVE A PREVIOUS PERSONAL HISTORY OF MORE THAN

          NINE SURGERIES, SPINA BIFIDA, OR REPEATED CATHERIZATIONS? NO

          LATEX RISK : ARE YOU FREQUENTLY EXPOSED TO LATEX PRODUCTS IN YOUR

          OCCUPATION?NO

          DATE ASKED : 2020

           

           

          ALCOHOL SCREENING

          DID YOU HAVE A DRINK CONTAINING ALCOHOL IN THE PAST YEAR?NO

          POINTS0

          INTERPRETATIONNEGATIVE

           

           

          RECREATIONAL DRUG USE

          DRUG USE?NO

           

           

          CAFFEINE

          CAFFEINE USE?YES 2 SODAS DAILY

           

           

          SEXUAL HX

          HAD SEX IN THE LAST 12 MONTHS (VAGINAL, ORAL, OR ANAL)?YES

          WITHMEN ONLY

          PREVENTION STRATEGIES DISCUSSED:OTHER

          USE PROTECTION?YES

          HOW OFTEN?SOME OF THE TIME

          LMP:3/19/18

          HAVE YOU EVER HAD AN STD?NO

           

           

          HIV / HEP-C SCREENING

          HIV TEST OFFERED TO PATIENT:YES

          DATE OFFERED:2018

          TEST ACCEPTED:YES

          BROCHURE PROVIDED TO PATIENTYES

           

           

          Scientology

          [id number] NONE

           

           

          LANGUAGE

          LANGUAGES SPOKEN:ENGLISH

           

           

          EDUCATION

          LEVEL OF EDUCATION:HIGH SCHOOL

           

           

          LEARNING BARRIERS / SPECIAL NEEDS

          HEARING IMPAIRED?NO

          VISION IMPAIRED?YES

          :CORRECTIVE LENSES

          COGNITIVELY IMPAIRED?YES

          :MENTAL RETARDATION

          READINESS TO LEARN?YES

          LEARNING PREFERENCES?YES

          LEARNING CAPABILITIES PRESENT?YES

          EMOTIONAL BARRIERS?NO

          SPECIAL DEVICES?NO

           NEEDED?NO

           

           

          DOMESTIC VIOLENCE

          STATUS:SINGLE

          DO YOU FEEL SAFE IN YOUR ENVIRONMENT?YES

           

           

          OCCUPATION: [employer]- [employer].

           

           

          MARITAL STATUS: SINGLE.

           

           

          OTHERS AT HOME: EX PARTNER, NOW ROOM MATES.

           

           

          PAIN CLINIC PFS, CLERGY, PUBLIC HEALTH REFERRALS

          PFS REFERRAL NEEDED?NO

          CLERGY REFERRAL NEEDED?NO

          PUBLIC HEALTH REFERRAL NEEDED?NO

          WAS THE PROVIDER NOTIFIED OF ANY PERTINENT INFO?YES

          HAS THE PATIENT BEEN EDUCATED REGARDING HIS/HER PLAN OF CARE?YES

          HAS THE PATIENT BEEN EDUCATED REGARDING PAIN, THE RISK FOR PAIN,

          THE IMPORTANCE OF EFFECTIVE PAIN MANAGEMENT, AND THE PAIN

          ASSESSMENT PROCESS?YES

           

           

          ADVANCE DIRECTIVE

          ADVANCE DIRECTIVE DISCUSSED WITH PATIENT:YES DOES NOT HAVE

          ADVANCED DIRECTIVES AND DOES NOT NEED INFORMATION

           

HOSPITALIZATION/MAJOR DIAGNOSTIC PROCEDURE

           

          NO HOSPITALIZATION HISTORY.

           

VITAL SIGNS

           

          .8 LBS, HT 64 IN, BMI 39.10 INDEX, /91 MM HG, 

          /MIN, RR 18 /MIN, TEMP 98.1 F, OXYGEN SAT % 99%, SAFE IN ENV?

          (Y/N) YES, NA INITIALS AW 1433, REVIEWED BY: ALVARO RN @ [id number].

           

EXAMINATION

           

      GENERAL EXAMINATION: THE PATIENT IS ALERT, ORIENTED

          TIMES THREE AND COOPERATIVE. HEART SHOWS REGULAR RHYTHM, NO

          MURMURS AND NO GALLOPS. LUNGS ARE CLEAR TO AUSCULTATION.

           

ASSESSMENTS

           

          MYALGIA, OTHER SITE - M79.18 (PRIMARY)

           

TREATMENT

           

      MYALGIA, OTHER SITE

          MEDICATION: NORCO TABLET 5MG/325MG ORALLY

          (HYDROCODONE/ACETAMINOPHEN)TERRIE FISHER 2020 3:34:32 PM >

          VERIFIED LOT # [id number] EXP 3/2022 TANI CHAO 2020

          3:36:22 PM > NORCO VERIFIED. TANI CHAO 2020 3:38:29 PM >

          NORCO ADMINISTERED 1538

          MEDICATION: VALIUM TAB 5MG ORALLY (DIAZEPAM)TERRIE FISHER

          2020 3:35:06 PM > VERIFIED LOT # [id number] EXP. 2021

          TANI CHAO 2020 3:36:47 PM > VALIUM VERIFIED TANI CHAO

          2020 3:39:15 PM > VALIUM ADMINISTERED 1538

          NOTES:

           

          DISCHARGE INSTRUCTIONS REVIEWED WITH PATIENT AND SHE VERBALIZED

          UNDERTANDING OF DISCHARGE INSTRUCTIONS.

           

          .

           

PROCEDURES

           

      PAIN NURSING RECORD

          PRE-PROCEDURE    IV SITE N/A, PRE-PROCEDURE ORAL MEDICATIONS PER

          MD ORDER

          PROCEDURE    IN ROOM 1425, PHYSICIAN IN ROOM 1608, START 1611,

          FINISH 1614, PHYSICIAN OUT OF ROOM 1615, OUT OF ROOM 1628,

          STEROID KENALOG, O2 RA, ECG N/A, PATIENT SHIELDED NO, SAFETY

          STRAP NO, PREP ALCOHOL BY DR CHILDS, IV INFUSED N/A, DRESSING

          TEGADERM BY NAKUL CHAO RN

          LOC:    1. ALERT, ORIENTED

          RESP:    1. REGULAR, NO DYSPNEA

          COLOR:    1. PINK

          SKIN:    1. WARM, DRY

          POSITION:    1. PRONE

          VITALS:    TANI CHAO 2020 4:19:41 PM > 119/75  16

          99% D/C V/S.

          DISCHARGE:    POST PAIN 6, DRESSING SITE DRY AND INTACT, IV N/A,

          GAIT STEADY, TEACHING COMPLETED, PATIENT ACKNOWLEDGES

          UNDERSTANDING YES, PATIENT DISCHARGED AT 1628

      PN TRIGGER POINT INJECTION WITH STEROIDS

          PRE PROCEDURE DIAGNOSIS    1. MYALGIA 2. PAIN AT BILATERAL

          THORACIC AREA AND BILATERAL LOWER BACK AREA

          POST PROCEDURE DIAGNOSIS    1. MYALGIA 2. PAIN AT BILATERAL

          THORACIC AREA AND BILATERAL LOWER BACK AREA

          PROCEDURE    TRIGGER POINT INJECTION AT BILATERAL THORACIC AREA

          AND BILATERAL LOWER BACK AREA

          SURGEON    DR. EYAD CHILDS

          ASSISTANT    NONE

          ANESTHESIA    LOCAL

          PRE PROCEDURE NOTE    THE PATIENT HAS A HISTORY OF CHRONIC PAIN

          AT THE RIGHT AND LEFT THORACIC AREA AND RIGHT AND LEFT LOWER BACK

          AREA. I EVALUATED THE PATIENT AND REVIEWED THE CHART. THERE IS

          EVIDENCE OF BANDS OF TISSUE WITH RESTRICTION OF MOVEMENT AND

          PRESENCE OF TRIGGER POINT AT THE RIGHT AND LEFT THORACIC AREA AND

          RIGHT AND LEFT LOWER BACK AREA. I WENT OVER THE RISKS,

          ALTERNATIVES, AND BENEFITS ASSOCIATED WITH THIS PROCEDURE. I

          DISCUSSED THAT THE USE OF STEROIDS MAY CONTRIBUTE TO

          IMMUNOSUPPRESSION OF THE PATIENT'S BODY AGAINST INFECTIONS SUCH

          AS COVID-19. THE PATIENT IS AWARE OF THE POTENTIAL COMPLICATIONS

          ASSOCIATED WITH THIS VIRUS, INCLUDING, BUT NOT LIMITED TO, DEATH.

          THE PATIENT WOULD LIKE TO PROCEED AND GIVE CONSENT TO PERFORMED

          THE PROCEDURE. THE PATIENT DENIES UNEXPLAINABLE WEIGHT LOSS,

          FEVER, CHILLS, OR NEW CHANGES IN URINARY OR BOWEL CONTROL. THE

          PATIENT IS COVID-19 NEGATIVE

          DESCRIPTION OF PROCEDURE    THE PATIENT WAS BROUGHT TO THE

          PROCEDURE ROOM AND PLACED IN THE SITTING POSITION. THE AREA WAS

          CLEANED WITH ALCOHOL. THE PROCEDURE WAS DONE USING ASEPTIC

          STERILE TECHNIQUE. A TIMEOUT WAS PERFORMED WHERE LATERALITY AND

          THE SITE OF THE PROCEDURE WERE CHECKED AND CONFIRMED WITH

          EVERYONE IN THE ROOM. USING A 25-GAUGE NEEDLE, TRIGGER POINTS

          WERE INJECTED AT THE RIGHT AND LEFT THORACIC AREA AND RIGHT AND

          LEFT LOWER BACK AREA WITH A TOTAL OF 40 ML OF BUPIVACAINE 0.25%

          AND KENALOG 40 MG. THE MEDICATIONS WERE VERIFIED WITH THE NURSE.

          THERE WAS NO EVIDENCE OF BLOOD OR PARESTHESIA DURING THE

          PROCEDURE. THE PATIENT WAS SENT TO THE RECOVERY ROOM. THE PATIENT

          WAS MOVING THE EXTREMITIES AND DOING WELL. THERE WERE NO

          COMPLICATIONS DURING THE PROCEDURE. ESTIMATED BLOOD LOSS WAS LESS

          THAN 5 ML

          POST PROCEDURE NOTE    DEPENDING ON THE RESULTS, WE SHOULD

          CONSIDER WORKING OVER HER FACETS IN HER LOWER BACK. THE PROCEDURE

          DONE WAS DISCUSSED WITH THE PATIENT. THE PATIENT WILL BE SEEN IN

          A FOLLOW UP IN THE NEXT FEW WEEKS. I AM LOOKING FOR LONG LASTING

          PAIN RELIEF FOR THE PATIENT WITH THIS INTERVENTION. INSTRUCTIONS

          WERE GIVEN, QUESTIONS WERE ANSWERED, AND THE PATIENT EXPRESSED

          UNDERSTANDING AND AGREES WITH THE PLAN. I, ESTER KNIGHT,

          DOCUMENTED THE ABOVE INFORMATION ACTING AS A SCRIBE FOR DR. CHILDS. I HAVE REVIEWED THE ABOVE DOCUMENT, WRITTEN BY ESTER KNIGHT, MEDICAL SCRIBE, AND I VERIFY THAT IT IS ACCURATE

           

PROCEDURE CODES

           

          83990 INJECT TRIGGER POINTS 3/>

           

DISPOSITION & COMMUNICATION

           

FOLLOW UP

           

          FOLLOW UP WITH NP (REASON: POST TPI BILATERAL THORACIC AND LOWER

          BACK AREA)

           

 

ELECTRONICALLY SIGNED BY EYAD CHILDS MD, MD ON

          2020 AT 09:21 AM EST

           

           

           

 

DISCLAIMER :

THIS IS A VISIT SUMMARY EXTRACTED FROM THE [employer] CHART.

IT IS NOT A COPY OF THE [employer] PROGRESS NOTE.

RAMÓN

## 2020-12-08 ENCOUNTER — HOSPITAL ENCOUNTER (OUTPATIENT)
Dept: HOSPITAL 53 - M PAIN | Age: 30
End: 2020-12-08
Attending: NURSE PRACTITIONER
Payer: COMMERCIAL

## 2020-12-08 DIAGNOSIS — F32.9: ICD-10-CM

## 2020-12-08 DIAGNOSIS — F17.210: ICD-10-CM

## 2020-12-08 DIAGNOSIS — Z79.899: ICD-10-CM

## 2020-12-08 DIAGNOSIS — F41.9: ICD-10-CM

## 2020-12-08 DIAGNOSIS — G89.29: Primary | ICD-10-CM

## 2020-12-08 DIAGNOSIS — J45.909: ICD-10-CM

## 2020-12-08 DIAGNOSIS — G47.00: ICD-10-CM

## 2020-12-10 NOTE — ECWPNPC
PATIENT NAME: ISAIAH RUSSELL

: 1990

GENDER: FEMALE

MRN: [id number]

VISIT DATE: 2020

DISCHARGE DATE: 20 1449

VISIT LOCKED DATE TIME: 

PHYSICIAN: CHARLEY GUTIÉRREZ 

RESOURCE: CHARLEY GUTIÉRREZ 

 

           

           

REASON FOR APPOINTMENT

           

          1. POST BILATERAL THORACIC AND LOW BACK TRIGGER POINT INJECTIONS

           

HISTORY OF PRESENT ILLNESS

           

      GENERAL:

           30-YEAR-OLD FEMALE IN FOR POST THORACIC AND LOW BACK TRIGGER

          POINT INJECTION FOLLOW-UP. PATIENT FEELS THE PROCEDURE WAS

          INEFFECTIVE. SHE RATES HER PAIN CURRENTLY AT A 9 OUT OF 10 AND

          DESCRIBES IT AS ACHING, AND THROBBING.

           

      FALL RISK SCREENING:

      SCREENING

           

           

          :NO FALLS REPORTED IN THE LAST YEAR

           

      PAIN SCREENING:

      PATIENT HAS A COMPLAINT OF ACUTE OR CHRONIC PAIN

           

           

          :YES

          LOCATION OF PAIN:MID BACK, LOW BACK

          INTENSITY OF PAIN (SCALE OF 1 TO 10):9

          WHAT DOES YOUR PAIN FEEL LIKE:ACHING, THROBBING

          DURATION:CONTINOUS, CONSTANT

          PAIN IS INCREASED BY:ACTIVITIES

          PAIN IS DECREASED BY:USE OF PAIN MEDICATIONS

          TREATMENT/MEDICATIONS USED TO MANAGE PAIN:OPIOIDS

          LEVEL OF RELIEF FROM PAIN TREATMENTS IN THE PAST:25%

          PAIN HAS INTERFERED WITH THE FOLLOWING:BATHING/DRESSING, WALKING

          ABILITY, HOUSEWORK, TRANSPORTATION, TOILETING

           

      NURSING NOTE:

           -.

           

      PAIN CENTER INTAKE QUESTIONS:

      DO YOU HAVE A HISTORY OF MRSA?

           

           

          :NO

           

      DO YOU TAKE A BLOOD THINNERS?

           

           

          :NO

           

      DO YOU HAVE ANY BLEEDING DISORDERS?

           

           

          :NO

           

      ANY NEW NUMBNESS OR WEAKNESS IN YOUR LEGS OR ARMS?

           

           

          :NO

           

      ANY PACEMAKER,DEFIBRILLATOR, OR DORSAL COLUMN

          STIMULATOR?

           

           

          :NO

           

      DO YOU HAVE ANY RASHES OR OPEN SORES?

           

           

          :NO

           

      ARE YOU ALLERGIC TO IV DYE?

           

           

          :NO

           

      ARE YOU DIABETIC?

           

           

          :NO

           

      ANY NEW PROBLEMS WITH YOUR MEDICATIONS?

           

           

          :NO

           

      HAVE YOU RECEIVED A VACCINE IN THE PAST 30 DAYS?

           

           

          :NO

           

      DO YOU PLAN TO RECEIVE A VACCINE IN THE NEXT 21

          DAYS?

           

           

          :NO

           

      DO YOU NEED ANY PRESCRIPTION?

           

           

          :NO

           

      DO YOU TAKE ANY IMMUNOSUPPRESSIVE MEDICATIONS?

           

           

          :NO

           

      IS THERE A CHANCE YOU COULD BE PREGNANT?

           

           

          :NO

           

      ARE YOU BREAST FEEDING?

           

           

          :NO

           

CURRENT MEDICATIONS

           

          TAKING TRAZODONE  MG TABLET 1 TABLET AT BEDTIME AS NEEDED

          ORALLY ONCE A DAY

          TAKING CLARITIN 10 MG TABLET 1 TABLET ORALLY ONCE A DAY

          TAKING BACLOFEN 10 MG TABLET 1 TABLET WITH FOOD OR MILK ORALLY

          THREE TIMES A DAY

          TAKING SEROQUEL 100 MG TABLET 1 TABLET AT BEDTIME ORALLY ONCE A

          DAY

          NOT-TAKING IBUPROFEN 800 MG TABLET 1 TABLET WITH FOOD OR MILK AS

          NEEDED ORALLY THREE TIMES A DAY

          NOT-TAKING ESCITALOPRAM OXALATE 20 MG TABLET 0.5 TABLET ORALLY

          ONCE A DAY, NOTES: [facility]

          NOT-TAKING HYDROXYZINE HCL 50 MG TABLET 1 TABLET AS NEEDED ORALLY

          TID AS NEEDED, NOTES: [facility]

          MEDICATION LIST REVIEWED AND RECONCILED WITH THE PATIENT

           

PAST MEDICAL HISTORY

           

          DEPRESSION/ANXIETY

          INSOMNIA

          ASTHMA

           

ALLERGIES

           

          SEASONALE

           

SURGICAL HISTORY

           

          TUBAL PREGNANCY- SYR 2016

           

FAMILY HISTORY

           

          FATHER: 

          MOTHER: 

          PATERNAL GRAND FATHER: UNKNOWN

          PATERNAL GRAND MOTHER: UNKNOWN

          MATERNAL GRAND FATHER: UNKNOWN

          MATERNAL GRAND MOTHER: UNKNOWN

          MOTHER PASSED AWAY- UNKNOWN MEDICAL ISSUES. LOST CONTACT WITH

          FATHER.

           

SOCIAL HISTORY

           

          GENERAL:

           

          TOBACCO USE

          ARE YOU A:CURRENT SMOKER

          ARE YOU INTERESTED IN QUITTING?NOT READY TO QUIT

          COUNSELED THE PATIENT ON SMOKING EFFECTS, EDUCATION

          [id number]/08/2020

          HOW MANY CIGARETTES A DAY DO YOU SMOKE?6-10

          HOW SOON AFTER YOU WAKE UP DO YOU SMOKE YOUR FIRST

          CIGARETTE?AFTER 60 MIN

          HOW OFTEN DO YOU SMOKE CIGARETTES?EVERY DAY

          PATIENT COUNSELED ON THE DANGERS OF TOBACCO USE AND URGED TO

          QUIT:10/06/2020

          SMOKING CESSATION INFORMATION GIVEN2020

           

           

          LATEX QUESTIONNAIRE

          LATEX ALLERGY : HAVE YOU EVER DEVELOPED ANY TYPE OF REACTION

          AFTER HANDLING LATEX PRODUCTS SUCH AS RUBBER GLOVES, CONDOMS,

          DIAPHRAGMS, BALLOONS, SOCKS, OR UNDERWEAR?NO

          LATEX ALLERGY : HAVE YOU EVER DEVELOPED ANY TYPE OF REACTION

          DURING OR AFTER DENTAL APPOINTMENT, VAGINAL/RECTAL EXAMINATION,

          SURGICAL PROCEDURE, OR ANY OTHER EXPOSURE?NO

          DATE ASKED : 2020

          LATEX RISK : HAVE YOU EVER HAD ANY DIFFICULTY BREATHING OR HIVES

          AFTER EATING OR HANDLING ANY FRUITS, OR VEGETABLES; SUCH AS KIWI,

          BANANAS, STONE FRUITS, OR CHESTNUTSNO

          LATEX RISK : DO YOU HAVE A PREVIOUS PERSONAL HISTORY OF MORE THAN

          NINE SURGERIES, SPINA BIFIDA, OR REPEATED CATHERIZATIONS? NO

          LATEX RISK : ARE YOU FREQUENTLY EXPOSED TO LATEX PRODUCTS IN YOUR

          OCCUPATION?NO

           

           

          ALCOHOL SCREENING

          DID YOU HAVE A DRINK CONTAINING ALCOHOL IN THE PAST YEAR?NO

          POINTS0

          INTERPRETATIONNEGATIVE

           

           

          RECREATIONAL DRUG USE

          DRUG USE?NO

           

           

          CAFFEINE

          CAFFEINE USE?YES 2 SODAS DAILY

           

           

          SEXUAL HX

          HAD SEX IN THE LAST 12 MONTHS (VAGINAL, ORAL, OR ANAL)?YES

          WITHMEN ONLY

          PREVENTION STRATEGIES DISCUSSED:OTHER

          USE PROTECTION?YES

          HOW OFTEN?SOME OF THE TIME

          LMP:3/19/18

          HAVE YOU EVER HAD AN STD?NO

           

           

          HIV / HEP-C SCREENING

          HIV TEST OFFERED TO PATIENT:YES

          DATE OFFERED:2018

          TEST ACCEPTED:YES

          BROCHURE PROVIDED TO PATIENTYES

           

           

          Zoroastrianism

          [id number] NONE

           

           

          LANGUAGE

          LANGUAGES SPOKEN:ENGLISH

           

           

          EDUCATION

          LEVEL OF EDUCATION:HIGH SCHOOL

           

           

          LEARNING BARRIERS / SPECIAL NEEDS

          HEARING IMPAIRED?NO

          VISION IMPAIRED?YES

          COGNITIVELY IMPAIRED?YES

          :CORRECTIVE LENSES

          :MENTAL RETARDATION

          READINESS TO LEARN?YES

          LEARNING PREFERENCES?YES

          LEARNING CAPABILITIES PRESENT?YES

          EMOTIONAL BARRIERS?NO

          SPECIAL DEVICES?NO

           NEEDED?NO

           

           

          DOMESTIC VIOLENCE

          STATUS:SINGLE

          DO YOU FEEL SAFE IN YOUR ENVIRONMENT?YES

           

           

          OCCUPATION: [employer].

           

           

          MARITAL STATUS: SINGLE.

           

           

          OTHERS AT HOME: EX PARTNER, NOW ROOM MATES.

           

           

          PAIN CLINIC PFS, CLERGY, PUBLIC HEALTH REFERRALS

          PFS REFERRAL NEEDED?NO

          CLERGY REFERRAL NEEDED?NO

          PUBLIC HEALTH REFERRAL NEEDED?NO

          WAS THE PROVIDER NOTIFIED OF ANY PERTINENT INFO?YES

          HAS THE PATIENT BEEN EDUCATED REGARDING HIS/HER PLAN OF CARE?YES

          HAS THE PATIENT BEEN EDUCATED REGARDING PAIN, THE RISK FOR PAIN,

          THE IMPORTANCE OF EFFECTIVE PAIN MANAGEMENT, AND THE PAIN

          ASSESSMENT PROCESS?YES

           

           

          ADVANCE DIRECTIVE

          ADVANCE DIRECTIVE DISCUSSED WITH PATIENT:YES DOES NOT HAVE

          ADVANCED DIRECTIVES AND DOES NOT NEED INFORMATION

           

HOSPITALIZATION/MAJOR DIAGNOSTIC PROCEDURE

           

          NO HOSPITALIZATION HISTORY.

           

REVIEW OF SYSTEMS

           

      CONSTITUTIONAL:

           

          ANY RECENT FEVER    NO . CHILLS    NO . WEIGHT CHANGE OF UNKNOWN

          REASONS    NO .

           

      GASTROENTEROLOGY:

           

          NEW UNEXPLAINABLE CHANGES IN BOWEL CONTROL    NO . CONSTIPATION  

           NO .

           

      GENITOURINARY:

           

          ANY NEW CHANGE IN BLADDER CONTROL?    NO .

           

      NEUROLOGY:

           

          NEW ONSET DIZZINESS OR NEUROLOGICAL CHANGES NOT MENTIONED    NO .

          NEW NUMBNESS OR PAIN PATTERNS NOT MENTIONED AND PERTINENT TO

          TODAY'S VISIT    NO .

           

      CARDIOLOGY:

           

          NEW CHEST PRESSURE    NO . NEW CHEST PAIN    NO .

           

      RESPIRATORY:

           

          UNEXPLAINABLE COUGH    NO . NEW SHORTNESS OF BREATH    NO .

           

VITAL SIGNS

           

          .8 LBS, HT 64 IN, BMI 39.61 INDEX, /93 MM HG, HR 98

          /MIN, RR 16 /MIN, TEMP 97.8 F, OXYGEN SAT % 99%, SAFE IN ENV?

          (Y/N) Y, NA INITIALS SC 14:21, REVIEWED BY: EM.

           

EXAMINATION

           

      GENERAL EXAMINATION:

          GENERALNO ACUTE DISTRESS, WELL NOURISHED AND HYDRATED.

           

          PSYCHAPPROPRIATE MOOD AND AFFECT .

           

          LUNGS:CLEAR TO AUSCULTATION BILATERALLY, NO WHEEZES, RHONCHI,

          RALES.

           

          HEART:NO MURMURS, REGULAR RATE AND RHYTHM.

           

ASSESSMENTS

           

          OTHER CHRONIC PAIN - G89.29 (PRIMARY)

           

TREATMENT

           

      OTHER CHRONIC PAIN

          PAIN PROCEDURE LOGDATE OF [id number]/23/20PROCEDURE:TRIGGER

          POINT INJECTION BILATERAL THORACIC AND LOW BACKAMOUNT OF PRE

          SEDATENORCO 5/325, VALIUM 5MGRESULT:INEFFECTIVE THIS PROCEDURE

          WAS REVIEWED BY PATRICIA CARRANZA ON 2020 AT 16:07 PM EST

          NOTES: 1-YEAR-OLD FEMALE IN FOR POST TRIGGER POINT INJECTION

          FOLLOW-UP. GIVEN PRESENTING SYMPTOMS RECOMMEND INCREASING

          BACLOFEN TO 20 MG 3 TIMES A DAY WITH FOLLOW-UP IN ONE MONTH TO

          DETERMINE EFFICACY OF TREATMENT. PATIENT HAS EXPRESSED UNDER

          STANDING OF AND WAS IN AGREEMENT WITH TREATMENT PLAN. GIVEN TIME

          TO ASK QUESTIONS AND EXPRESS CONCERNS.

           

           

      OTHERS

          INCREASE BACLOFEN TABLET, 20 MG, 1 TABLET WITH FOOD OR MILK,

          ORALLY, THREE TIMES A DAY, 30 DAY(S), 90

           

PROCEDURE CODES

           

           ESTABILISHED PATIENT [facility] FACILITY CHARGE

           

DISPOSITION & COMMUNICATION

           

FOLLOW UP

           

          4 WEEKS (REASON: MEDICINE INCREASE )

           

 

ELECTRONICALLY SIGNED BY JUVENTINO BAUER ON

          2020 AT 09:23 AM EST

           

           

           

 

DISCLAIMER :

THIS IS A VISIT SUMMARY EXTRACTED FROM THE [employer] CHART.

IT IS NOT A COPY OF THE [employer] PROGRESS NOTE.

RAMÓN

## 2021-01-11 ENCOUNTER — HOSPITAL ENCOUNTER (OUTPATIENT)
Dept: HOSPITAL 53 - M PAIN | Age: 31
End: 2021-01-11
Attending: NURSE PRACTITIONER
Payer: COMMERCIAL

## 2021-01-11 DIAGNOSIS — Z86.59: ICD-10-CM

## 2021-01-11 DIAGNOSIS — J45.909: ICD-10-CM

## 2021-01-11 DIAGNOSIS — Z79.899: ICD-10-CM

## 2021-01-11 DIAGNOSIS — F17.210: ICD-10-CM

## 2021-01-11 DIAGNOSIS — E66.01: ICD-10-CM

## 2021-01-11 DIAGNOSIS — G47.00: ICD-10-CM

## 2021-01-11 DIAGNOSIS — M79.18: Primary | ICD-10-CM

## 2021-01-11 DIAGNOSIS — M54.9: ICD-10-CM

## 2021-01-14 NOTE — ECWPNPC
PATIENT NAME: ISAIAH RUSESLL

: 1990

GENDER: FEMALE

MRN: [id number]

VISIT DATE: 2021

DISCHARGE DATE: 21 1518

VISIT LOCKED DATE TIME: 

PHYSICIAN: CHARLEY GUTIÉRREZ 

PHYSICIAN PAGER NO: INACTIVE

RESOURCE: CHARLEY GUTIÉRREZ 

 

           

           

REASON FOR APPOINTMENT

           

          1. MEDICAL INCREASE

           

HISTORY OF PRESENT ILLNESS

           

      DEPRESSION SCREENING:

      PHQ-2 (2015 EDITION)

           

           

          LITTLE INTEREST OR PLEASURE IN DOING THINGS?NOT AT ALL

          FEELING DOWN, DEPRESSED, OR HOPELESS?NOT AT ALL

          TOTAL SCORE0

           

           30-YEAR-OLD FEMALE IN FOR CHRONIC PAIN FOLLOW-UP. AT LAST CLINIC

          VISIT PATIENT'S BACLOFEN WAS INCREASED AND SHE ADMITS TODAY THAT

          THIS HAS BEEN BENEFICIAL. SHE RATES HER PAIN CURRENTLY AT A 9 OUT

          OF 10 AVERAGE IN AROUND A 6 OUT OF 10. SHE DESCRIBES HER PAIN AS

          ACHING, SHARP, AND STABBING. PATIENT DOES ADMIT TO INCREASED PAIN

          AND NEW ONSET OF RADICULAR SYMPTOMS.

           

      GENERAL:

           -.

           

      FALL RISK SCREENING:

      SCREENING

           

           

          :NO FALLS REPORTED IN THE LAST YEAR

           

      PAIN SCREENING:

      PATIENT HAS A COMPLAINT OF ACUTE OR CHRONIC PAIN

           

           

          :YES

          LOCATION OF PAIN:UPPER BACK, MID BACK, LOW BACK, LEG(S)

          INTENSITY OF PAIN (SCALE OF 1 TO 10):9 AVERAGE 6

          WHAT DOES YOUR PAIN FEEL LIKE:ACHING, SHARP, STABBING

          DURATION:CONTINOUS, CONSTANT, AWAKENS FROM SLEEP

          PAIN IS INCREASED BY:ACTIVITIES, OTHERS WALKING

          PAIN IS DECREASED BY:USE OF PAIN MEDICATIONS, OTHERS LAYING DOWN

          OR SITTING

          TREATMENT/MEDICATIONS USED TO MANAGE PAIN:OTC PAIN RELIEVERS,

          NSAIDS, PHYSICAL THERAPY

           

      NURSING NOTE:

           -.

           

      PAIN CENTER INTAKE QUESTIONS:

      DO YOU HAVE A HISTORY OF MRSA?

           

           

          :NO

           

      DO YOU TAKE A BLOOD THINNERS?

           

           

          :NO

           

      DO YOU HAVE ANY BLEEDING DISORDERS?

           

           

          :NO

           

      ANY NEW NUMBNESS OR WEAKNESS IN YOUR LEGS OR ARMS?

           

           

          :NO

           

      ANY PACEMAKER,DEFIBRILLATOR, OR DORSAL COLUMN

          STIMULATOR?

           

           

          :NO

           

      DO YOU HAVE ANY RASHES OR OPEN SORES?

           

           

          :NO

           

      ARE YOU ALLERGIC TO IV DYE?

           

           

          :NO

           

      ARE YOU DIABETIC?

           

           

          :NO

           

      ANY NEW PROBLEMS WITH YOUR MEDICATIONS?

           

           

          :NO

           

      HAVE YOU RECEIVED A VACCINE IN THE PAST 30 DAYS?

           

           

          :NO

           

      DO YOU PLAN TO RECEIVE A VACCINE IN THE NEXT 21

          DAYS?

           

           

          :NO

           

      DO YOU NEED ANY PRESCRIPTION?

           

           

          :YES BACLOFEN

           

      DO YOU TAKE ANY IMMUNOSUPPRESSIVE MEDICATIONS?

           

           

          :NO

           

      IS THERE A CHANCE YOU COULD BE PREGNANT?

           

           

          :NO

           

      ARE YOU BREAST FEEDING?

           

           

          :NO

           

CURRENT MEDICATIONS

           

          TAKING TRAZODONE  MG TABLET 1 TABLET AT BEDTIME AS NEEDED

          ORALLY ONCE A DAY

          TAKING CLARITIN 10 MG TABLET 1 TABLET ORALLY ONCE A DAY

          TAKING SEROQUEL 100 MG TABLET 1 TABLET AT BEDTIME ORALLY ONCE A

          DAY

          TAKING BACLOFEN 20 MG TABLET 1 TABLET WITH FOOD OR MILK ORALLY

          THREE TIMES A DAY

          NOT-TAKING IBUPROFEN 800 MG TABLET 1 TABLET WITH FOOD OR MILK AS

          NEEDED ORALLY THREE TIMES A DAY

          NOT-TAKING ESCITALOPRAM OXALATE 20 MG TABLET 0.5 TABLET ORALLY

          ONCE A DAY, NOTES: [facility]

          NOT-TAKING HYDROXYZINE HCL 50 MG TABLET 1 TABLET AS NEEDED ORALLY

          TID AS NEEDED, NOTES: [facility]

          MEDICATION LIST REVIEWED AND RECONCILED WITH THE PATIENT

           

PAST MEDICAL HISTORY

           

          DEPRESSION/ANXIETY

          INSOMNIA

          ASTHMA

           

ALLERGIES

           

          SEASONALE

           

SURGICAL HISTORY

           

          TUBAL PREGNANCY- SYR 2016

           

FAMILY HISTORY

           

          FATHER: 

          MOTHER: 

          PATERNAL GRAND FATHER: UNKNOWN

          PATERNAL GRAND MOTHER: UNKNOWN

          MATERNAL GRAND FATHER: UNKNOWN

          MATERNAL GRAND MOTHER: UNKNOWN

          MOTHER PASSED AWAY- UNKNOWN MEDICAL ISSUES. LOST CONTACT WITH

          FATHER.

           

SOCIAL HISTORY

           

          GENERAL:

           

          TOBACCO USE

          ARE YOU A:CURRENT SMOKER

          HOW OFTEN DO YOU SMOKE CIGARETTES?EVERY DAY

          HOW SOON AFTER YOU WAKE UP DO YOU SMOKE YOUR FIRST

          CIGARETTE?AFTER 60 MIN

          HOW MANY CIGARETTES A DAY DO YOU SMOKE?6-10

          ARE YOU INTERESTED IN QUITTING?NOT READY TO QUIT

          PATIENT COUNSELED ON THE DANGERS OF TOBACCO USE AND URGED TO

          QUIT:10/06/2020

          COUNSELED THE PATIENT ON SMOKING EFFECTS, EDUCATION

          [id number]/08/2020

          SMOKING CESSATION INFORMATION GIVEN2020

           

           

          LATEX QUESTIONNAIRE

          LATEX ALLERGY : HAVE YOU EVER DEVELOPED ANY TYPE OF REACTION

          AFTER HANDLING LATEX PRODUCTS SUCH AS RUBBER GLOVES, CONDOMS,

          DIAPHRAGMS, BALLOONS, SOCKS, OR UNDERWEAR?NO

          LATEX ALLERGY : HAVE YOU EVER DEVELOPED ANY TYPE OF REACTION

          DURING OR AFTER DENTAL APPOINTMENT, VAGINAL/RECTAL EXAMINATION,

          SURGICAL PROCEDURE, OR ANY OTHER EXPOSURE?NO

          LATEX RISK : HAVE YOU EVER HAD ANY DIFFICULTY BREATHING OR HIVES

          AFTER EATING OR HANDLING ANY FRUITS, OR VEGETABLES; SUCH AS KIWI,

          BANANAS, STONE FRUITS, OR CHESTNUTSNO

          LATEX RISK : DO YOU HAVE A PREVIOUS PERSONAL HISTORY OF MORE THAN

          NINE SURGERIES, SPINA BIFIDA, OR REPEATED CATHERIZATIONS? NO

          LATEX RISK : ARE YOU FREQUENTLY EXPOSED TO LATEX PRODUCTS IN YOUR

          OCCUPATION?NO

          DATE ASKED : 2021

           

           

          ALCOHOL SCREENING

          DID YOU HAVE A DRINK CONTAINING ALCOHOL IN THE PAST YEAR?NO

          POINTS0

          INTERPRETATIONNEGATIVE

           

           

          RECREATIONAL DRUG USE

          DRUG USE?NO

           

           

          CAFFEINE

          CAFFEINE USE?YES 2 SODAS DAILY

           

           

          SEXUAL HX

          HAD SEX IN THE LAST 12 MONTHS (VAGINAL, ORAL, OR ANAL)?YES

          WITHMEN ONLY

          PREVENTION STRATEGIES DISCUSSED:OTHER

          USE PROTECTION?YES

          HOW OFTEN?SOME OF THE TIME

          LMP:3/19/18

          HAVE YOU EVER HAD AN STD?NO

           

           

          HIV / HEP-C SCREENING

          HIV TEST OFFERED TO PATIENT:YES

          DATE OFFERED:2018

          TEST ACCEPTED:YES

          BROCHURE PROVIDED TO PATIENTYES

           

           

          Spiritism

          [id number] NONE

           

           

          LANGUAGE

          LANGUAGES SPOKEN:ENGLISH

           

           

          EDUCATION

          LEVEL OF EDUCATION:HIGH SCHOOL

           

           

          LEARNING BARRIERS / SPECIAL NEEDS

          BARRIERS TO LEARNING?YES HAS DIFFICULTY WITH COMPREHENSION PER

          PATIENT.

          COMMENTSDOCUMENTED IN NOTES SECTION>

          HEARING IMPAIRED?NO

          VISION IMPAIRED?YES

          :CORRECTIVE LENSES

          COGNITIVELY IMPAIRED?YES

          :MENTAL RETARDATION

          READINESS TO LEARN?YES

          LEARNING PREFERENCES?YES

          LEARNING CAPABILITIES PRESENT?YES

          EMOTIONAL BARRIERS?NO

          SPECIAL DEVICES?NO

           NEEDED?NO

           

           

          DOMESTIC VIOLENCE

          STATUS:SINGLE

          DO YOU FEEL SAFE IN YOUR ENVIRONMENT?YES

           

           

          OCCUPATION: [employer].

           

           

          MARITAL STATUS: SINGLE.

           

           

          OTHERS AT HOME: EX PARTNER, NOW ROOM MATES.

           

           

          PAIN CLINIC PFS, CLERGY, PUBLIC HEALTH REFERRALS

          PFS REFERRAL NEEDED?NO

          CLERGY REFERRAL NEEDED?NO

          PUBLIC HEALTH REFERRAL NEEDED?NO

          WAS THE PROVIDER NOTIFIED OF ANY PERTINENT INFO?YES

          HAS THE PATIENT BEEN EDUCATED REGARDING HIS/HER PLAN OF CARE?YES

          HAS THE PATIENT BEEN EDUCATED REGARDING PAIN, THE RISK FOR PAIN,

          THE IMPORTANCE OF EFFECTIVE PAIN MANAGEMENT, AND THE PAIN

          ASSESSMENT PROCESS?YES

           

           

          ADVANCE DIRECTIVE

          ADVANCE DIRECTIVE DISCUSSED WITH PATIENT:YES DOES NOT HAVE

          ADVANCED DIRECTIVES AT THIS TIME. PATIENT GIVEN INFORMATION AND

          PATIENT WILL TAKE HOME TO REVIEW.

           

HOSPITALIZATION/MAJOR DIAGNOSTIC PROCEDURE

           

          NO HOSPITALIZATION HISTORY.

           

REVIEW OF SYSTEMS

           

      CONSTITUTIONAL:

           

          ANY RECENT FEVER    NO . CHILLS    NO . WEIGHT CHANGE OF UNKNOWN

          REASONS    NO .

           

      GASTROENTEROLOGY:

           

          NEW UNEXPLAINABLE CHANGES IN BOWEL CONTROL    NO . CONSTIPATION  

           NO .

           

      GENITOURINARY:

           

          ANY NEW CHANGE IN BLADDER CONTROL?    NO .

           

      NEUROLOGY:

           

          NEW ONSET DIZZINESS OR NEUROLOGICAL CHANGES NOT MENTIONED    NO .

          NEW NUMBNESS OR PAIN PATTERNS NOT MENTIONED AND PERTINENT TO

          TODAY'S VISIT    NO .

           

      CARDIOLOGY:

           

          NEW CHEST PRESSURE    NO . NEW CHEST PAIN    NO .

           

      RESPIRATORY:

           

          UNEXPLAINABLE COUGH    NO . NEW SHORTNESS OF BREATH    NO .

           

VITAL SIGNS

           

          .8 LBS, HT 64 IN, BMI 40.30 INDEX, /74 MM HG, 

          /MIN, RR 18 /MIN, TEMP 98.8 F, OXYGEN SAT % 96%, SAFE IN ENV?

          (Y/N) YES, NA INITIALS JS 1400, REVIEWED BY: BOAZ LANDIS MA.

           

EXAMINATION

           

      GENERAL EXAMINATION:

          GENERALNO ACUTE DISTRESS, WELL NOURISHED AND HYDRATED.

           

          PSYCHAPPROPRIATE MOOD AND AFFECT .

           

          LUNGS:CLEAR TO AUSCULTATION BILATERALLY, NO WHEEZES, RHONCHI,

          RALES.

           

          HEART:NO MURMURS, REGULAR RATE AND RHYTHM.

           

          BACK:POINT TENDER BILATERAL THORACIC AND LOW BACK , SURROUNDING

          SKIN SHOWS NO ERYTHEMA, ECCHYMOSIS, INCREASED WARMTH, AND/OR SKIN

          ERUPTIONS NOTED. BANDS OF RESTRICTIVE TISSUE NOTED OVER TRIGGER

          POINTS .

           

ASSESSMENTS

           

          MYALGIA, OTHER SITE - M79.18 (PRIMARY)

           

          DORSALGIA - M54.9

           

TREATMENT

           

      MYALGIA, OTHER SITE

          NOTES:

           

          30-YEAR-OLD FEMALE IN FOR CHRONIC PAIN FOLLOW-UP. GIVEN

          PRESENTING SYMPTOMS AND RESULTS OF PHYSICAL EXAMINATION RECOMMEND

          BILATERAL THORACIC AND LOW BACK TRIGGER POINT INJECTIONS WITH

          POST PROCEDURAL FOLLOW-UP. FURTHER RECOMMENDED GETTING AN UPDATED

          MRI. PATIENT EXPRESSED UNDERSTANDING OF AND WAS IN AGREEMENT WITH

          TREATMENT PLAN. GIVEN TIME TO ASK QUESTIONS AND EXPRESS CONCERNS.

           

          PRINTED AND REVIEWED PRE-PROCEDURE INSTRUCTIONS WITH PATIENT.

          PATIENT VERBALIZED UNDERSTANDING. TOM WAGNER 2021

           

          .

           

           

      DORSALGIA

          [facility] MRI LUMBAR W/O CONTRAST (CPT 90076)[id number]

           

PROCEDURE CODES

           

           ESTABILISHED PATIENT [facility] FACILITY CHARGE

           

DISPOSITION & COMMUNICATION

           

FOLLOW UP

           

          POSTPROCEDURE (REASON: BILATERAL THORACIC AND LOW BACK TRIGGER

          POINT INJECTIONS, LUMBAR MRI)

           

 

ELECTRONICALLY SIGNED BY JUVENTINO BAUER ON

          2021 AT 10:13 AM EST

           

           

           

 

DISCLAIMER :

THIS IS A VISIT SUMMARY EXTRACTED FROM THE [employer] CHART.

IT IS NOT A COPY OF THE [employer] PROGRESS NOTE.

RAMÓN

## 2021-01-28 ENCOUNTER — HOSPITAL ENCOUNTER (EMERGENCY)
Dept: HOSPITAL 53 - M ED | Age: 31
LOS: 1 days | Discharge: HOME | End: 2021-01-29
Payer: COMMERCIAL

## 2021-01-28 VITALS — WEIGHT: 230.6 LBS | BODY MASS INDEX: 39.37 KG/M2 | HEIGHT: 64 IN

## 2021-01-28 DIAGNOSIS — Z79.899: ICD-10-CM

## 2021-01-28 DIAGNOSIS — N89.8: Primary | ICD-10-CM

## 2021-01-28 DIAGNOSIS — F17.210: ICD-10-CM

## 2021-01-28 PROCEDURE — 96361 HYDRATE IV INFUSION ADD-ON: CPT

## 2021-01-28 PROCEDURE — 87210 SMEAR WET MOUNT SALINE/INK: CPT

## 2021-01-28 PROCEDURE — 86901 BLOOD TYPING SEROLOGIC RH(D): CPT

## 2021-01-28 PROCEDURE — 76856 US EXAM PELVIC COMPLETE: CPT

## 2021-01-28 PROCEDURE — 93976 VASCULAR STUDY: CPT

## 2021-01-28 PROCEDURE — 86850 RBC ANTIBODY SCREEN: CPT

## 2021-01-28 PROCEDURE — 96374 THER/PROPH/DIAG INJ IV PUSH: CPT

## 2021-01-28 PROCEDURE — 99284 EMERGENCY DEPT VISIT MOD MDM: CPT

## 2021-01-28 PROCEDURE — 86900 BLOOD TYPING SEROLOGIC ABO: CPT

## 2021-01-28 PROCEDURE — 85610 PROTHROMBIN TIME: CPT

## 2021-01-28 PROCEDURE — 85025 COMPLETE CBC W/AUTO DIFF WBC: CPT

## 2021-01-28 PROCEDURE — 85730 THROMBOPLASTIN TIME PARTIAL: CPT

## 2021-01-28 PROCEDURE — 84702 CHORIONIC GONADOTROPIN TEST: CPT

## 2021-01-28 PROCEDURE — 87661 TRICHOMONAS VAGINALIS AMPLIF: CPT

## 2021-01-28 PROCEDURE — 80047 BASIC METABLC PNL IONIZED CA: CPT

## 2021-01-29 VITALS — SYSTOLIC BLOOD PRESSURE: 134 MMHG | DIASTOLIC BLOOD PRESSURE: 86 MMHG

## 2021-01-29 LAB
APTT BLD: 33.3 SECONDS (ref 24.2–38.5)
BASOPHILS # BLD AUTO: 0 10^3/UL (ref 0–0.2)
BASOPHILS NFR BLD AUTO: 0.1 % (ref 0–1)
CHLAMYDIA DNA AMPLIFICATION: NEGATIVE
EOSINOPHIL # BLD AUTO: 0 10^3/UL (ref 0–0.5)
EOSINOPHIL NFR BLD AUTO: 0 % (ref 0–3)
HCT VFR BLD AUTO: 39.3 % (ref 36–47)
HGB BLD-MCNC: 12.2 G/DL (ref 12–15.5)
INR PPP: 1.02
LYMPHOCYTES # BLD AUTO: 0.6 10^3/UL (ref 1.5–5)
LYMPHOCYTES NFR BLD AUTO: 7 % (ref 24–44)
MCH RBC QN AUTO: 25.5 PG (ref 27–33)
MCHC RBC AUTO-ENTMCNC: 31 G/DL (ref 32–36.5)
MCV RBC AUTO: 82 FL (ref 80–96)
MONOCYTES # BLD AUTO: 0 10^3/UL (ref 0–0.8)
MONOCYTES NFR BLD AUTO: 0.5 % (ref 0–5)
N GONORRHOEA RRNA SPEC QL NAA+PROBE: NEGATIVE
NEUTROPHILS # BLD AUTO: 7.9 10^3/UL (ref 1.5–8.5)
NEUTROPHILS NFR BLD AUTO: 91.5 % (ref 36–66)
PLATELET # BLD AUTO: 356 10^3/UL (ref 150–450)
PROTHROMBIN TIME: 13.6 SECONDS (ref 12.5–14.3)
RBC # BLD AUTO: 4.79 10^6/UL (ref 4–5.4)
WBC # BLD AUTO: 8.6 10^3/UL (ref 4–10)

## 2021-01-29 NOTE — REPVR
PROCEDURE INFORMATION: 



Exam: US Non-obstetric Pelvis; Complete 

Exam date and time: 1/29/21  (1:17am)

Age: 30 years old 

Clinical indication:  Irregular menstruation.  Heavy vaginal bleeding. 



TECHNIQUE: 

Imaging protocol: Transabdominal pelvic nonobstetric ultrasound. Complete 

examination. Real time ultrasound with image documentation. 



COMPARISON: 

US PELVIS of 12/15/16



FINDINGS:

The LMP is reported to be:  1/18/21



The uterus is anteverted, measuring 7.6 x 3.1 x 5.0 cm in dimensions.

      No uterine mass is seen.

      The endometrium is thin (4.1 mm thickness).



The right ovary measures 3.4 x 2.9 x 3.6 cm in size.

   Simple right ovarian cyst (2.5 x 2.8 x 2.2 cm size) (2.5 cm avg. size).

The left ovary measures 3.0 x 2.6 x 3.0 cm in size.

There is no evidence of ovarian torsion on Doppler evaluation.



No free pelvic fluid is seen.   

No solid adnexal masses.



IMPRESSION:

No acute pathology.

No solid pelvic mass.  Thin endometrium.

Simple right ovarian cyst (2.5 cm size).

No evidence of ovarian torsion.



Electronically signed by: Joaquina Smith On 01/29/2021  02:26:36 AM

## 2021-02-04 ENCOUNTER — HOSPITAL ENCOUNTER (OUTPATIENT)
Dept: HOSPITAL 53 - M RAD | Age: 31
End: 2021-02-04
Attending: NURSE PRACTITIONER
Payer: COMMERCIAL

## 2021-02-04 DIAGNOSIS — M51.27: Primary | ICD-10-CM

## 2021-02-04 NOTE — REPVR
PROCEDURE INFORMATION: 

Exam: MR Lumbar Spine Without Contrast. 

Exam date and time: 2/4/2021 8:38 AM 

Age: 30 years old 

Clinical indication: Patient HX: Low back pain nki priors 8/2019; Additional 

info: Dorsalgia 



TECHNIQUE: 

Imaging protocol: Multiplanar magnetic resonance images of the lumbar spine 

without intravenous contrast. 



COMPARISON: 

MRI-Spine, L.S. without con 7/23/2019 6:47 PM 



FINDINGS: 

Vertebrae:  There is 6 mm of grade 1 retrolisthesis of L5 with respect to S1.  

Normal vertebral body alignment is otherwise preserved.  Vertebral body heights 

are within normal limits.

Spinal cord: Normal signal. No cord compression. 

L1-L2:  No significant disc disease. No significant spinal canal stenosis. No 

neural foraminal stenosis. 

L2-L3:  No significant disc disease. No significant spinal canal stenosis. No 

neural foraminal stenosis. 

L3-L4:  No significant disc disease. No significant spinal canal stenosis. No 

neural foraminal stenosis. 

L4-L5:  No significant disc disease. No significant spinal canal stenosis. No 

neural foraminal stenosis. 

L5-S1:  There is disc bulging/uncovering related to listhesis.  There is mild 

facet hypertrophy.  There is mild-to-moderate right and moderate left neural 

foraminal narrowing.  There is mild canal stenosis.  

Soft tissues: Unremarkable. 



IMPRESSION: 

Disc bulging/uncovering related to listhesis and facet hypertrophy at L5/S1 

contribute to mild-to-moderate right and moderate left neural foraminal 

narrowing as well as mild acquired canal stenosis.



Electronically signed by: Alondra Ladd On 02/04/2021  08:50:22 AM

## 2021-02-16 ENCOUNTER — HOSPITAL ENCOUNTER (OUTPATIENT)
Dept: HOSPITAL 53 - M CARPUL | Age: 31
End: 2021-02-16
Attending: NURSE PRACTITIONER
Payer: COMMERCIAL

## 2021-02-16 DIAGNOSIS — R06.00: Primary | ICD-10-CM

## 2021-02-16 PROCEDURE — 94070 EVALUATION OF WHEEZING: CPT

## 2021-02-16 PROCEDURE — 95070 INHLJ BRNCL CHALLENGE TSTG: CPT

## 2021-02-16 NOTE — PFTRPT
Visit Date: 02/16/2021

Referring Doctor: Ceci Vega

Height: 62.00  Inches  Weight: 233.00  Lbs  BSA: 2.04

Diagnosis: DYSPNEA



QUALITY: Study of excellent technical quality. 



PROCEDURE: Under protocol, methacholine was administered. At a dose of 2.5 mg or
13.875 CDUs, a 23% decline in the FEV1 was noted. PC of 0.89 is significant. 
Flow rates did return to baseline post bronchodilator administration. 

 

IMPRESSION: Positive methacholine challenge study.





MTDD

## 2021-02-17 ENCOUNTER — HOSPITAL ENCOUNTER (OUTPATIENT)
Dept: HOSPITAL 53 - M LABSMTC | Age: 31
End: 2021-02-17
Attending: ANESTHESIOLOGY
Payer: COMMERCIAL

## 2021-02-17 DIAGNOSIS — Z20.822: Primary | ICD-10-CM

## 2021-02-22 ENCOUNTER — HOSPITAL ENCOUNTER (OUTPATIENT)
Dept: HOSPITAL 53 - M PAIN | Age: 31
End: 2021-02-22
Attending: ANESTHESIOLOGY
Payer: COMMERCIAL

## 2021-02-22 DIAGNOSIS — J45.909: ICD-10-CM

## 2021-02-22 DIAGNOSIS — G47.00: ICD-10-CM

## 2021-02-22 DIAGNOSIS — F17.210: ICD-10-CM

## 2021-02-22 DIAGNOSIS — Z79.899: ICD-10-CM

## 2021-02-22 DIAGNOSIS — Z86.59: ICD-10-CM

## 2021-02-22 DIAGNOSIS — M79.18: Primary | ICD-10-CM

## 2021-02-22 DIAGNOSIS — G47.30: ICD-10-CM

## 2021-02-22 DIAGNOSIS — E66.01: ICD-10-CM

## 2021-02-22 PROCEDURE — 20553 NJX 1/MLT TRIGGER POINTS 3/>: CPT

## 2021-02-25 NOTE — ECWPNPC
PATIENT NAME: ISAIAH RUSSELL

: 1990

GENDER: FEMALE

MRN: [id number]

VISIT DATE: 2021

DISCHARGE DATE: 21 1220

VISIT LOCKED DATE TIME: 

PHYSICIAN: EYAD CHILDS MD

PHYSICIAN PAGER NO: INACTIVE

RESOURCE: EYAD CHILDS MD

 

           

           

REASON FOR APPOINTMENT

           

          1. TRIGGER POINT INJECTIONS BILATERAL THORACIC AND BILATERAL LOW

          BACK

           

HISTORY OF PRESENT ILLNESS

           

      GENERAL:

           -.

           

      FALL RISK SCREENING:

      SCREENING

           

           

          :NO FALLS REPORTED IN THE LAST YEAR

           

      PAIN SCREENING:

      PATIENT HAS A COMPLAINT OF ACUTE OR CHRONIC PAIN

           

           

          :YES

          LOCATION OF PAIN:MID BACK, LOW BACK

          INTENSITY OF PAIN (SCALE OF 1 TO 10):9

          WHAT DOES YOUR PAIN FEEL LIKE:ACHING, CONTINOUS, SHARP, TENDER,

          THROBBING, SORE

          DURATION:CONTINOUS, CONSTANT

          PAIN IS INCREASED BY:ACTIVITIES

          PAIN IS DECREASED BY:USE OF PAIN MEDICATIONS

           

      NURSING NOTE:

           -.

           

      PAIN CENTER INTAKE QUESTIONS:

      DO YOU HAVE A HISTORY OF MRSA?

           

           

          :NO

           

      DO YOU TAKE A BLOOD THINNERS?

           

           

          :NO

           

      DO YOU HAVE ANY BLEEDING DISORDERS?

           

           

          :NO

           

      ANY NEW NUMBNESS OR WEAKNESS IN YOUR LEGS OR ARMS?

           

           

          :NO

           

      ANY PACEMAKER,DEFIBRILLATOR, OR DORSAL COLUMN

          STIMULATOR?

           

           

          :NO

           

      DO YOU HAVE ANY RASHES OR OPEN SORES?

           

           

          :NO

           

      ARE YOU ALLERGIC TO IV DYE?

           

           

          :NO

           

      ARE YOU DIABETIC?

           

           

          :NO

           

      ANY NEW PROBLEMS WITH YOUR MEDICATIONS?

           

           

          :NO

           

      HAVE YOU RECEIVED A VACCINE IN THE PAST 30 DAYS?

           

           

          :NO

           

      DO YOU PLAN TO RECEIVE A VACCINE IN THE NEXT 21

          DAYS?

           

           

          :NO

           

      DO YOU TAKE ANY IMMUNOSUPPRESSIVE MEDICATIONS?

           

           

          :NO

           

      ANY HISTORY OF SEIZURES?

           

           

          :NO

           

      ANY HISTORY OF CARDIAC ISSUES OR EVENTS?

           

           

          :NO

           

      DO YOU HAVE SLEEP APNEA?

           

           

          :YES

          DO YOU WEAR A CPAP?YES

           

      ANY RECENT HEAD INJURY?

           

           

          :NO

           

      DO YOU HAVE ANY NEW INFECTIONS?

           

           

          :NO

           

      IS THERE A CHANCE YOU COULD BE PREGNANT?

           

           

          :NO

           

      ARE YOU BREAST FEEDING?

           

           

          :NO

           

      WHEN DID YOU LAST EAT?

           

           

          : - 1900

           

      WHEN DID YOU LAST DRINK?

           

           

          : - 08

           

      WHAT DID YOU LAST DRINK?

           

           

          : WATER

           

      NAME OF PERSON DRIVING YOU HOME?

           

           

          : -[employer]

           

      DO YOU HAVE ANY OTHER QUESTIONS OR CONCERNS?

           

           

          : -

           

CURRENT MEDICATIONS

           

          TAKING CLARITIN 10 MG TABLET 1 TABLET ORALLY ONCE A DAY

          TAKING SEROQUEL 100 MG TABLET 1 TABLET AT BEDTIME ORALLY ONCE A

          DAY

          TAKING BACLOFEN 20 MG TABLET 1 TABLET WITH FOOD OR MILK ORALLY

          THREE TIMES A DAY

          TAKING IBUPROFEN 200 MG TABLET 1 TABLET WITH FOOD OR MILK AS

          NEEDED ORALLY THREE TIMES A DAY

          NOT-TAKING TRAZODONE  MG TABLET 1 TABLET AT BEDTIME AS

          NEEDED ORALLY ONCE A DAY

          NOT-TAKING IBUPROFEN 800 MG TABLET 1 TABLET WITH FOOD OR MILK AS

          NEEDED ORALLY THREE TIMES A DAY

          NOT-TAKING ESCITALOPRAM OXALATE 20 MG TABLET 0.5 TABLET ORALLY

          ONCE A DAY, NOTES: [facility]

          NOT-TAKING HYDROXYZINE HCL 50 MG TABLET 1 TABLET AS NEEDED ORALLY

          TID AS NEEDED, NOTES: [facility]

          MEDICATION LIST REVIEWED AND RECONCILED WITH THE PATIENT

           

PAST MEDICAL HISTORY

           

          DEPRESSION/ANXIETY

          INSOMNIA

          ASTHMA

           

ALLERGIES

           

          SEASONALE

           

SURGICAL HISTORY

           

          TUBAL PREGNANCY- SYR 2016

           

FAMILY HISTORY

           

          FATHER: 

          MOTHER: 

          PATERNAL GRAND FATHER: UNKNOWN

          PATERNAL GRAND MOTHER: UNKNOWN

          MATERNAL GRAND FATHER: UNKNOWN

          MATERNAL GRAND MOTHER: UNKNOWN

          MOTHER PASSED AWAY- UNKNOWN MEDICAL ISSUES. LOST CONTACT WITH

          FATHER.

           

SOCIAL HISTORY

           

          GENERAL:

           

          TOBACCO USE

          ARE YOU A:CURRENT SMOKER

          ARE YOU INTERESTED IN QUITTING?NOT READY TO QUIT

          COUNSELED THE PATIENT ON SMOKING EFFECTS, EDUCATION

          [id number]/08/2020

          HOW MANY CIGARETTES A DAY DO YOU SMOKE?6-10

          HOW SOON AFTER YOU WAKE UP DO YOU SMOKE YOUR FIRST

          CIGARETTE?AFTER 60 MIN

          HOW OFTEN DO YOU SMOKE CIGARETTES?EVERY DAY

          PATIENT COUNSELED ON THE DANGERS OF TOBACCO USE AND URGED TO

          QUIT:10/06/2020

          SMOKING CESSATION INFORMATION GIVEN2020

           

           

          LATEX QUESTIONNAIRE

          LATEX ALLERGY : HAVE YOU EVER DEVELOPED ANY TYPE OF REACTION

          AFTER HANDLING LATEX PRODUCTS SUCH AS RUBBER GLOVES, CONDOMS,

          DIAPHRAGMS, BALLOONS, SOCKS, OR UNDERWEAR?NO

          LATEX ALLERGY : HAVE YOU EVER DEVELOPED ANY TYPE OF REACTION

          DURING OR AFTER DENTAL APPOINTMENT, VAGINAL/RECTAL EXAMINATION,

          SURGICAL PROCEDURE, OR ANY OTHER EXPOSURE?NO

          LATEX RISK : HAVE YOU EVER HAD ANY DIFFICULTY BREATHING OR HIVES

          AFTER EATING OR HANDLING ANY FRUITS, OR VEGETABLES; SUCH AS KIWI,

          BANANAS, STONE FRUITS, OR CHESTNUTSNO

          LATEX RISK : DO YOU HAVE A PREVIOUS PERSONAL HISTORY OF MORE THAN

          NINE SURGERIES, SPINA BIFIDA, OR REPEATED CATHERIZATIONS? NO

          LATEX RISK : ARE YOU FREQUENTLY EXPOSED TO LATEX PRODUCTS IN YOUR

          OCCUPATION?NO

          DATE ASKED : 2021

           

           

          ALCOHOL SCREENING

          DID YOU HAVE A DRINK CONTAINING ALCOHOL IN THE PAST YEAR?NO

          POINTS0

          INTERPRETATIONNEGATIVE

           

           

          RECREATIONAL DRUG USE

          DRUG USE?NO

           

           

          CAFFEINE

          CAFFEINE USE?YES 2 SODAS DAILY

           

           

          SEXUAL HX

          HAD SEX IN THE LAST 12 MONTHS (VAGINAL, ORAL, OR ANAL)?YES

          WITHMEN ONLY

          PREVENTION STRATEGIES DISCUSSED:OTHER

          USE PROTECTION?YES

          HOW OFTEN?SOME OF THE TIME

          LMP:3/19/18

          HAVE YOU EVER HAD AN STD?NO

           

           

          HIV / HEP-C SCREENING

          HIV TEST OFFERED TO PATIENT:YES

          DATE OFFERED:2018

          TEST ACCEPTED:YES

          BROCHURE PROVIDED TO PATIENTYES

           

           

          Mandaen

          [id number] NONE

           

           

          LANGUAGE

          LANGUAGES SPOKEN:ENGLISH

           

           

          EDUCATION

          LEVEL OF EDUCATION:HIGH SCHOOL

           

           

          LEARNING BARRIERS / SPECIAL NEEDS

          BARRIERS TO LEARNING?YES HAS DIFFICULTY WITH COMPREHENSION PER

          PATIENT.

          COMMENTSDOCUMENTED IN NOTES SECTION>

          HEARING IMPAIRED?NO

          VISION IMPAIRED?YES

          :CORRECTIVE LENSES

          COGNITIVELY IMPAIRED?YES

          :MENTAL RETARDATION

          READINESS TO LEARN?YES

          LEARNING PREFERENCES?YES

          LEARNING CAPABILITIES PRESENT?YES

          EMOTIONAL BARRIERS?NO

          SPECIAL DEVICES?NO

           NEEDED?NO

           

           

          DOMESTIC VIOLENCE

          STATUS:SINGLE

          DO YOU FEEL SAFE IN YOUR ENVIRONMENT?YES

           

           

          OCCUPATION: [employer].

           

           

          MARITAL STATUS: SINGLE.

           

           

          OTHERS AT HOME: EX PARTNER, NOW ROOM MATES.

           

           

          -

          PFS REFERRAL NEEDED?NO

          CLERGY REFERRAL NEEDED?NO

          PUBLIC HEALTH REFERRAL NEEDED?NO

          WAS THE PROVIDER NOTIFIED OF ANY PERTINENT INFO?YES

          HAS THE PATIENT BEEN EDUCATED REGARDING HIS/HER PLAN OF CARE?YES

          HAS THE PATIENT BEEN EDUCATED REGARDING PAIN, THE RISK FOR PAIN,

          THE IMPORTANCE OF EFFECTIVE PAIN MANAGEMENT, AND THE PAIN

          ASSESSMENT PROCESS?YES

           

           

          ADVANCE DIRECTIVE

          ADVANCE DIRECTIVE DISCUSSED WITH PATIENT:YES DOES NOT HAVE

          ADVANCED DIRECTIVES AT THIS TIME. PATIENT GIVEN INFORMATION AND

          PATIENT WILL TAKE HOME TO REVIEW.

           

VITAL SIGNS

           

          .8 LBS, HT 64 IN, BMI 40.30 INDEX, /84 MM HG, HR 87

          /MIN, RR 18 /MIN, TEMP 96.9 F, OXYGEN SAT % 100%, SAFE IN ENV?

          (Y/N) YES, NA INITIALS SC 10:55, REVIEWED BY: ALVARO BHANDARI.

           

EXAMINATION

           

      GENERAL EXAMINATION: THE PATIENT IS ALERT, ORIENTED

          TIMES THREE AND COOPERATIVE. LUNGS ARE CLEAR TO AUSCULTATION.

          HEART SHOWS REGULAR RHYTHM, NO MURMURS AND NO GALLOPS.

           

ASSESSMENTS

           

          MYALGIA, OTHER SITE - M79.18 (PRIMARY)

           

TREATMENT

           

      MYALGIA, OTHER SITE

          MEDICATION: VALIUM TAB 5MG ORALLY (DIAZEPAM)KRISHNA PENNINGTON 2021

          11:29:07 AM > VERIFIED TANI CHAO 2021 11:31:09 AM >

          ADMINISTERED

          COMPLETION OF PROCEDURAL VISIT WHEN MEETS CRITERIA

          MED: PAIN NORCO TABLET 5MG/325MG ORALLY

          HYDROCODONE/ACETAMINOPHENKRISHNA PENNINGTON 2021 11:29:46 AM >

          VERIFIED TANI CHAO 2021 11:31:35 AM > ADMINISTERED

           

           

      OTHERS

          NOTES:

           

          21 1755 ATTEMPTED PAT. PATIENT NOT HOME. STATED WE WOULD TRY

          AGAIN ANOTHER TIME. COLLIN RAUSCH RN

           

          .

           

PROCEDURES

           

      PAIN NURSING RECORD

          PROCEDURE    IN ROOM 1055, PHYSICIAN IN ROOM 1151, START 1154,

          FINISH 1156, PHYSICIAN OUT OF ROOM 1156, ECG N/A, PATIENT

          SHIELDED N/A, SAFETY STRAP N/A, PREP ALCOHOL BY DR CHILDS,

          DRESSING TEGADERM BY NAKUL CHAO RN

          LOC:    TANI CHAO 2021 11:47:50 AM > , 1. ALERT, ORIENTED

          RESP:    TANI CHAO 2021 11:47:55 AM > , 1. REGULAR, NO

          DYSPNEA

          COLOR:    TANI CHAO 2021 11:48:01 AM > , 1. PINK

          SKIN:    TANI CHAO 2021 11:48:05 AM > , 1. WARM, DRY

          POSITION:    TANI CHAO 2021 11:48:11 AM > , 5. SITTING

          VITALS:    TANI CHAO 2021 12:05:33 PM > 120/74 HR90 16

          100%R/A D/C V/S

          COMPLETION OF PROCEDURE APPOINTMENT:    POST PAIN 9, DRESSING

          SITE DRY AND INTACT, IV N/A, GAIT STEADY, TEACHING COMPLETED,

          PATIENT ACKNOWLEDGES UNDERSTANDING YES, PROCEDURE APPOINTMENT

          COMPLETED AT 1213

      PN TRIGGER POINT INJECTION WITH STEROIDS

          PRE PROCEDURE DIAGNOSIS    1. MYALGIA 2. PAIN AT BILATERAL

          THORACIC AREA AND BILATERAL LOW BACK AREA

          POST PROCEDURE DIAGNOSIS    1. MYALGIA 2. PAIN AT BILATERAL

          THORACIC AREA AND BILATERAL LOW BACK AREA

          PROCEDURE    TRIGGER POINT INJECTION AT BILATERAL THORACIC AREA

          AND BILATERAL LOW BACK AREA

          SURGEON    DR. EYAD CHILDS

          ASSISTANT    NONE

          ANESTHESIA    LOCAL

          PRE PROCEDURE NOTE    THE PATIENT HAS A HISTORY OF CHRONIC PAIN

          AT THE RIGHT AND LEFT THORACIC AREA AND RIGHT AND LEFT LOW BACK

          AREA. I EVALUATED THE PATIENT AND REVIEWED THE CHART. THERE IS

          EVIDENCE OF BANDS OF TISSUE WITH RESTRICTION OF MOVEMENT AND

          PRESENCE OF TRIGGER POINT AT THE RIGHT AND LEFT THORACIC AREA AND

          RIGHT AND LEFT LOW BACK AREA. I WENT OVER THE RISKS,

          ALTERNATIVES, AND BENEFITS ASSOCIATED WITH THIS PROCEDURE. THE

          PATIENT WOULD LIKE TO PROCEED AND GIVE CONSENT TO PERFORMED THE

          PROCEDURE. THE PATIENT DENIES UNEXPLAINABLE WEIGHT LOSS, FEVER,

          CHILLS, OR NEW CHANGES IN URINARY OR BOWEL CONTROL. THE PATIENT

          IS COVID-19 NEGATIVE

          DESCRIPTION OF PROCEDURE    THE PATIENT WAS BROUGHT TO THE

          PROCEDURE ROOM AND PLACED IN THE SITTING POSITION. THE AREA WAS

          CLEANED WITH ALCOHOL. THE PROCEDURE WAS DONE USING ASEPTIC

          STERILE TECHNIQUE. A TIMEOUT WAS PERFORMED WHERE THE CONSENTED

          SITE WAS VERIFIED WITH EVERYONE IN THE ROOM. USING A 25-GAUGE

          NEEDLE, TRIGGER POINTS WERE INJECTED AT THE RIGHT AND LEFT

          THORACIC AREA AND RIGHT AND LEFT LOW BACK AREA WITH A TOTAL OF 40

          ML OF BUPIVACAINE 0.25% AND KENALOG 40 MG. THE MEDICATIONS WERE

          VERIFIED WITH THE NURSE. THERE WAS NO EVIDENCE OF BLOOD OR

          PARESTHESIA DURING THE PROCEDURE. THE PATIENT WAS SENT TO THE

          RECOVERY ROOM. THE PATIENT WAS MOVING THE EXTREMITIES AND DOING

          WELL. THERE WERE NO COMPLICATIONS DURING THE PROCEDURE. ESTIMATED

          BLOOD LOSS WAS LESS THAN 5 ML

          POST PROCEDURE NOTE    DEPENDING ON THE RESULTS, CONSIDER WORKING

          WITH THE FACETS. THE PROCEDURE DONE WAS DISCUSSED WITH THE

          PATIENT. THE PATIENT WILL BE SEEN IN A FOLLOW UP IN THE NEXT FEW

          WEEKS. I AM LOOKING FOR LONG LASTING PAIN RELIEF FOR THE PATIENT

          WITH THIS INTERVENTION. INSTRUCTIONS WERE GIVEN, QUESTIONS WERE

          ANSWERED, AND THE PATIENT EXPRESSED UNDERSTANDING AND AGREES WITH

          THE PLAN. I, ESTER KNIGHT, DOCUMENTED THE ABOVE INFORMATION

          ACTING AS A SCRIBE FOR DR. CHILDS. I HAVE REVIEWED THE ABOVE

          DOCUMENT, WRITTEN BY ESTER KNIGHT, MEDICAL SCRIBE, AND I

          VERIFY THAT IT IS ACCURATE

           

PROCEDURE CODES

           

          67501 INJECT TRIGGER POINTS 3/>

           

DISPOSITION & COMMUNICATION

           

FOLLOW UP

           

          FOLLOW UP WITH NP (REASON: POST TRIGGER POINT INJECTIONS

          BILATERAL THORACIC AND BILATERAL LOW BACK)

           

 

ELECTRONICALLY SIGNED BY EYAD CHILDS MD, MD ON

          2021 AT 01:11 PM EST

           

           

           

 

DISCLAIMER :

THIS IS A VISIT SUMMARY EXTRACTED FROM THE [employer] CHART.

IT IS NOT A COPY OF THE [employer] PROGRESS NOTE.

MTDD

## 2021-03-08 ENCOUNTER — HOSPITAL ENCOUNTER (OUTPATIENT)
Dept: HOSPITAL 53 - M PAIN | Age: 31
End: 2021-03-08
Attending: NURSE PRACTITIONER
Payer: COMMERCIAL

## 2021-03-08 DIAGNOSIS — F41.9: ICD-10-CM

## 2021-03-08 DIAGNOSIS — M47.897: ICD-10-CM

## 2021-03-08 DIAGNOSIS — Z79.899: ICD-10-CM

## 2021-03-08 DIAGNOSIS — J45.909: ICD-10-CM

## 2021-03-08 DIAGNOSIS — F32.9: ICD-10-CM

## 2021-03-08 DIAGNOSIS — F17.210: ICD-10-CM

## 2021-03-08 DIAGNOSIS — G89.29: Primary | ICD-10-CM

## 2021-03-08 DIAGNOSIS — G47.00: ICD-10-CM

## 2021-03-13 NOTE — ECWPNPC
PATIENT NAME: ISAIAH RUSSELL

: 1990

GENDER: FEMALE

MRN: [id number]

VISIT DATE: 2021

DISCHARGE DATE: 21 1440

VISIT LOCKED DATE TIME: 

PHYSICIAN: ARTI DAVIS

PHYSICIAN PAGER NO: INACTIVE

RESOURCE: ARTI DAVIS

 

           

           

REASON FOR APPOINTMENT

           

          1. POST BILATERAL THORACIC AND LOW BACK TRIGGER POINT INJECTIONS

           

HISTORY OF PRESENT ILLNESS

           

      GENERAL:

           HERE FOR POST PROCEDURE FOLLOW-UP. THIS IS A PATIENT THAT

          USUALLY FOLLOWS WITH CHARLEY GUTIÉRREZ HERE AT THE PAIN CLINIC. HAD

          TRIGGER POINT INJECTIONS BILATERAL THORACIC AND LOW BACK ON

          2021. REPORTING NO IMPROVEMENT IN PAIN POST PROCEDURE.

          STATES SHE'S HAD THIS PROCEDURE DONE BEFORE IN THE PAST AND IT

          WASN'T HELPFUL EITHER. DR. CHILDS HAD SUGGESTED WE CONSIDER

          LUMBAR FACET INJECTIONS. TODAY I DISCUSSED LUMBAR FACET

          DIAGNOSTIC TESTING AND RADIOFREQUENCY. PATIENT WOULD LIKE TO

          PURSUE THIS.-.

           

      FALL RISK SCREENING:

      SCREENING

          : NO FALLS REPORTED IN THE LAST YEAR.

           

      PAIN SCREENING:

      PATIENT HAS A COMPLAINT OF ACUTE OR CHRONIC PAIN

           

           

          :YES

          LOCATION OF PAIN:LOW BACK

          INTENSITY OF PAIN (SCALE OF 1 TO 10):10

          WHAT DOES YOUR PAIN FEEL LIKE:ACHING, BURNING, THROBBING

          DURATION:CONTINOUS, CONSTANT, ALL DAY

          PAIN IS INCREASED BY:ACTIVITIES

          PAIN IS DECREASED BY:OTHERS LAYING DOWN

           

      NURSING NOTE:

           -.

           

      PAIN CENTER INTAKE QUESTIONS:

      DO YOU HAVE A HISTORY OF MRSA?

           

           

          :NO

           

      DO YOU TAKE A BLOOD THINNERS?

           

           

          :NO

           

      DO YOU HAVE ANY BLEEDING DISORDERS?

           

           

          :NO

           

      ANY NEW NUMBNESS OR WEAKNESS IN YOUR LEGS OR ARMS?

           

           

          :NO

           

      ANY PACEMAKER,DEFIBRILLATOR, OR DORSAL COLUMN

          STIMULATOR?

           

           

          :NO

           

      DO YOU HAVE ANY RASHES OR OPEN SORES?

           

           

          :NO

           

      ARE YOU ALLERGIC TO IV DYE?

           

           

          :NO

           

      ARE YOU DIABETIC?

           

           

          :NO

           

      ANY NEW PROBLEMS WITH YOUR MEDICATIONS?

           

           

          :NO

           

      HAVE YOU RECEIVED A VACCINE IN THE PAST 30 DAYS?

           

           

          :NO

           

      DO YOU PLAN TO RECEIVE A VACCINE IN THE NEXT 21

          DAYS?

           

           

          :NO

           

      DO YOU NEED ANY PRESCRIPTION?

           

           

          :YES BACLOFEN

           

      DO YOU TAKE ANY IMMUNOSUPPRESSIVE MEDICATIONS?

           

           

          :NO

           

      IS THERE A CHANCE YOU COULD BE PREGNANT?

           

           

          :NO

           

      ARE YOU BREAST FEEDING?

           

           

          :NO

           

CURRENT MEDICATIONS

           

          TAKING CLARITIN 10 MG TABLET 1 TABLET ORALLY ONCE A DAY

          TAKING SEROQUEL 100 MG TABLET 1 TABLET AT BEDTIME ORALLY ONCE A

          DAY

          TAKING BACLOFEN 20 MG TABLET 1 TABLET WITH FOOD OR MILK ORALLY

          THREE TIMES A DAY

          NOT-TAKING TRAZODONE  MG TABLET 1 TABLET AT BEDTIME AS

          NEEDED ORALLY ONCE A DAY

          NOT-TAKING IBUPROFEN 800 MG TABLET 1 TABLET WITH FOOD OR MILK AS

          NEEDED ORALLY THREE TIMES A DAY

          NOT-TAKING ESCITALOPRAM OXALATE 20 MG TABLET 0.5 TABLET ORALLY

          ONCE A DAY, NOTES: [facility]

          NOT-TAKING HYDROXYZINE HCL 50 MG TABLET 1 TABLET AS NEEDED ORALLY

          TID AS NEEDED, NOTES: [facility]

          NOT-TAKING IBUPROFEN 200 MG TABLET 1 TABLET WITH FOOD OR MILK AS

          NEEDED ORALLY THREE TIMES A DAY

          MEDICATION LIST REVIEWED AND RECONCILED WITH THE PATIENT

           

PAST MEDICAL HISTORY

           

          DEPRESSION/ANXIETY

          INSOMNIA

          ASTHMA

          LOW BACK PAIN

           

ALLERGIES

           

          SEASONALE

           

SOCIAL HISTORY

           

          GENERAL:

           

          TOBACCO USE

          ARE YOU A:CURRENT SMOKER

          HOW OFTEN DO YOU SMOKE CIGARETTES?EVERY DAY

          HOW SOON AFTER YOU WAKE UP DO YOU SMOKE YOUR FIRST

          CIGARETTE?AFTER 60 MIN

          HOW MANY CIGARETTES A DAY DO YOU SMOKE?6-10

          ARE YOU INTERESTED IN QUITTING?NOT READY TO QUIT

          PATIENT COUNSELED ON THE DANGERS OF TOBACCO USE AND URGED TO

          QUIT:10/06/2020

          COUNSELED THE PATIENT ON SMOKING EFFECTS, EDUCATION

          [id number]/08/2020

          SMOKING CESSATION INFORMATION GIVEN2020

           

           

          LATEX QUESTIONNAIRE

          LATEX ALLERGY : HAVE YOU EVER DEVELOPED ANY TYPE OF REACTION

          AFTER HANDLING LATEX PRODUCTS SUCH AS RUBBER GLOVES, CONDOMS,

          DIAPHRAGMS, BALLOONS, SOCKS, OR UNDERWEAR?NO

          LATEX ALLERGY : HAVE YOU EVER DEVELOPED ANY TYPE OF REACTION

          DURING OR AFTER DENTAL APPOINTMENT, VAGINAL/RECTAL EXAMINATION,

          SURGICAL PROCEDURE, OR ANY OTHER EXPOSURE?NO

          LATEX RISK : HAVE YOU EVER HAD ANY DIFFICULTY BREATHING OR HIVES

          AFTER EATING OR HANDLING ANY FRUITS, OR VEGETABLES; SUCH AS KIWI,

          BANANAS, STONE FRUITS, OR CHESTNUTSNO

          LATEX RISK : DO YOU HAVE A PREVIOUS PERSONAL HISTORY OF MORE THAN

          NINE SURGERIES, SPINA BIFIDA, OR REPEATED CATHERIZATIONS? NO

          LATEX RISK : ARE YOU FREQUENTLY EXPOSED TO LATEX PRODUCTS IN YOUR

          OCCUPATION?NO

          DATE ASKED : 2021

           

           

          ALCOHOL USE: NO.

           

           

          ALCOHOL SCREENING

          DID YOU HAVE A DRINK CONTAINING ALCOHOL IN THE PAST YEAR?NO

          POINTS0

          INTERPRETATIONNEGATIVE

           

           

          RECREATIONAL DRUG USE

          DRUG USE?NO

           

           

          CAFFEINE

          CAFFEINE USE?YES 2 SODAS DAILY

           

           

          SEXUAL HX

          HAD SEX IN THE LAST 12 MONTHS (VAGINAL, ORAL, OR ANAL)?YES

          WITHMEN ONLY

          PREVENTION STRATEGIES DISCUSSED:OTHER

          USE PROTECTION?YES

          HOW OFTEN?SOME OF THE TIME

          LMP:3/19/18

          HAVE YOU EVER HAD AN STD?NO

           

           

          HIV / HEP-C SCREENING

          HIV TEST OFFERED TO PATIENT:YES

          DATE OFFERED:2018

          TEST ACCEPTED:YES

          BROCHURE PROVIDED TO PATIENTYES

           

           

          Hoahaoism

          [id number] NONE

           

           

          LANGUAGE

          LANGUAGES SPOKEN:ENGLISH

           

           

          EDUCATION

          LEVEL OF EDUCATION:HIGH SCHOOL

           

           

          LEARNING BARRIERS / SPECIAL NEEDS

          CHANGE FROM LAST VISIT?NO

          BARRIERS TO LEARNING?YES HAS DIFFICULTY WITH COMPREHENSION PER

          PATIENT.

          COMMENTSDOCUMENTED IN NOTES SECTION>

          HEARING IMPAIRED?NO

          VISION IMPAIRED?YES

          :CORRECTIVE LENSES

          COGNITIVELY IMPAIRED?YES

          :MENTAL RETARDATION

          READINESS TO LEARN?YES

          LEARNING PREFERENCES?YES

          LEARNING CAPABILITIES PRESENT?YES

          EMOTIONAL BARRIERS?NO

          SPECIAL DEVICES?NO

           NEEDED?NO

           

           

          DOMESTIC VIOLENCE

          STATUS:SINGLE

          DO YOU FEEL SAFE IN YOUR ENVIRONMENT?YES

           

           

          OCCUPATION: [employer].

           

           

          MARITAL STATUS: SINGLE.

           

           

          OTHERS AT HOME: EX PARTNER, NOW ROOM MATES.

           

           

          -

          PFS REFERRAL NEEDED?NO

          CLERGY REFERRAL NEEDED?NO

          PUBLIC HEALTH REFERRAL NEEDED?NO

          WAS THE PROVIDER NOTIFIED OF ANY PERTINENT INFO?YES

          HAS THE PATIENT BEEN EDUCATED REGARDING HIS/HER PLAN OF CARE?YES

          HAS THE PATIENT BEEN EDUCATED REGARDING PAIN, THE RISK FOR PAIN,

          THE IMPORTANCE OF EFFECTIVE PAIN MANAGEMENT, AND THE PAIN

          ASSESSMENT PROCESS?YES

           

           

          ADVANCE DIRECTIVE

          ADVANCE DIRECTIVE DISCUSSED WITH PATIENT:YES DOES NOT HAVE

          ADVANCED DIRECTIVES AT THIS TIME. PATIENT GIVEN INFORMATION AND

          PATIENT WILL TAKE HOME TO REVIEW.

           

REVIEW OF SYSTEMS

           

      CONSTITUTIONAL:

           

          ANY RECENT FEVER    NO . CHILLS    NO . WEIGHT CHANGE OF UNKNOWN

          REASONS    NO .

           

      GASTROENTEROLOGY:

           

          NEW UNEXPLAINABLE CHANGES IN BOWEL CONTROL    NO . CONSTIPATION  

           NO .

           

      GENITOURINARY:

           

          ANY NEW CHANGE IN BLADDER CONTROL?    NO .

           

      NEUROLOGY:

           

          NEW ONSET DIZZINESS OR NEUROLOGICAL CHANGES NOT MENTIONED    NO .

          NEW NUMBNESS OR PAIN PATTERNS NOT MENTIONED AND PERTINENT TO

          TODAY'S VISIT    NO .

           

      CARDIOLOGY:

           

          NEW CHEST PRESSURE    NO . PATIENT DENIES    NO .

           

      RESPIRATORY:

           

          UNEXPLAINABLE COUGH    NO . NEW SHORTNESS OF BREATH    NO .

           

VITAL SIGNS

           

          .2 LBS, HT 64 IN, BMI 39.51 INDEX, /70 MM HG, HR 86

          /MIN, RR 18 /MIN, TEMP 97.4 F, OXYGEN SAT % 97%, NA INITIALS AW

          1408.

           

EXAMINATION

           

      GENERAL EXAMINATION:

          GENERAL AWAKE,ALERT ,PLEAASANT .

           

          PSYCH AFFECT NORMAL .

           

          LUNGS: LUNG FIELDS ARE CLEAR TO AUSCULTATION BILATERALLY. GOOD

          MOVEMENT OF AIR .

           

          HEART: S1, S2 IN A REGULAR RATE AND RHYTHM. NO SIGNIFICANT

          MURMURS, RUBS OR GALLOPS NOTED .

           

          LUMBAR: PALPATION: + FOR PAIN OVER L/S SPINE. + FOR PAIN OVER L/S

          PARASPINALS .SPECIFIC POINT TENDERNESS OVER BILAT L4/5-L5/S1

          LUMBAR FACETS WITH FACET LOADING.

           

          NEUROLOGIC EXAM: NORMAL SENSATION LIGHT TOUCH BILAT. LOWER

          EXTREMITIES .

           

          DIAGNOSTIC TESTS REVIEWED MRI L/S SPINE-21.

           

ASSESSMENTS

           

          OTHER CHRONIC PAIN - G89.29 (PRIMARY)

           

          OTHER SPONDYLOSIS, LUMBOSACRAL REGION - M47.897

           

TREATMENT

           

      OTHER CHRONIC PAIN

          PAIN PROCEDURE LOGDATE OF PROCEDURE1PROCEDURE:TRIGGER

          POINT INJECTION BILATERAL THORACIC AND BILATERAL LOW BACKAMOUNT

          OF PRE SEDATEVALIUM 5MG, NORCO 5/325MGRESULT:NO IMPROVEMENT POST

          PROCEDURE

          NOTES: BILATERAL DIAGNOSTIC LUMBAR FACET BLOCK L4-5,L5-S1.

           

PROCEDURE CODES

           

           ESTABILISHED PATIENT [facility] FACILITY CHARGE

           

DISPOSITION & COMMUNICATION

           

FOLLOW UP

           

          POST PROCEDURE WITH CHARLEY (REASON: BILATERAL DIAGNOSTIC LUMBAR

          FACET BLOCK L4-5,L5-S1)

           

 

ELECTRONICALLY SIGNED BY JUVENTINO NICOLE ON

          2021 AT 11:02 AM EST

           

           

           

 

DISCLAIMER :

THIS IS A VISIT SUMMARY EXTRACTED FROM THE [facility].

IT IS NOT A COPY OF THE [facility] PROGRESS NOTE.

RAMÓN

## 2021-03-17 ENCOUNTER — HOSPITAL ENCOUNTER (OUTPATIENT)
Dept: HOSPITAL 53 - M LABSMTC | Age: 31
End: 2021-03-17
Attending: ANESTHESIOLOGY
Payer: COMMERCIAL

## 2021-03-17 DIAGNOSIS — Z11.52: Primary | ICD-10-CM

## 2021-03-22 ENCOUNTER — HOSPITAL ENCOUNTER (OUTPATIENT)
Dept: HOSPITAL 53 - M PAIN | Age: 31
End: 2021-03-22
Attending: ANESTHESIOLOGY
Payer: COMMERCIAL

## 2021-03-22 DIAGNOSIS — F17.210: ICD-10-CM

## 2021-03-22 DIAGNOSIS — M47.816: Primary | ICD-10-CM

## 2021-03-22 DIAGNOSIS — F32.9: ICD-10-CM

## 2021-03-22 DIAGNOSIS — Z79.899: ICD-10-CM

## 2021-03-22 DIAGNOSIS — F41.9: ICD-10-CM

## 2021-03-22 DIAGNOSIS — J45.909: ICD-10-CM

## 2021-03-22 DIAGNOSIS — M47.817: ICD-10-CM

## 2021-03-22 PROCEDURE — 64494 INJ PARAVERT F JNT L/S 2 LEV: CPT

## 2021-03-22 PROCEDURE — 64493 INJ PARAVERT F JNT L/S 1 LEV: CPT

## 2021-03-22 NOTE — REP
INDICATION:

BIALTERAL LUMBAR DIAGNOSTIC FACET BLOCK.



COMPARISON:

None.



TECHNIQUE:

C-arm views of lower lumbar spine performed.



FINDINGS:

Needles are seen along the lower lumbar spine and a small amount of contrast is

injected.



IMPRESSION:

27 seconds fluoroscopy time utilized.





<Electronically signed by Dae Lemus > 03/22/21 2206

## 2021-03-23 NOTE — ECWPNPC
PATIENT NAME: ISAIAH RUSSELL

: 1990

GENDER: FEMALE

MRN: [id number]

VISIT DATE: 2021

DISCHARGE DATE: 21 1240

VISIT LOCKED DATE TIME: 

PHYSICIAN: EYAD CHILDS MD

PHYSICIAN PAGER NO: INACTIVE

RESOURCE: EYAD CHILDS MD

 

           

           

REASON FOR APPOINTMENT

           

          1. BILATERAL DIAGNOSTIC LUMBAR FACET BLOCK #1 L4-L5, L5-S1

           

HISTORY OF PRESENT ILLNESS

           

      GENERAL:

           -.

           

      FALL RISK SCREENING:

      SCREENING

          : NO FALLS REPORTED IN THE LAST YEAR.

           

      PAIN SCREENING:

      PATIENT HAS A COMPLAINT OF ACUTE OR CHRONIC PAIN

           

           

          :YES

          LOCATION OF PAIN: LOW BACK

          INTENSITY OF PAIN (SCALE OF 1 TO 10):10

          WHAT DOES YOUR PAIN FEEL LIKE:STABBING, SHOOTING, ACHING

          DURATION:CONTINOUS, AWAKENS FROM SLEEP

          PAIN IS INCREASED BY:ACTIVITIES, PROLONGED STANDING

          PAIN IS DECREASED BY:USE OF PAIN MEDICATIONS

          PLAN/GOALS/TREATMENT/INTERVENTION/FOLLOW UP:SEE PLAN

           

      NURSING NOTE:

           -.

           

      PAIN CENTER INTAKE QUESTIONS:

      DO YOU HAVE A HISTORY OF MRSA?

           

           

          :NO

           

      DO YOU TAKE A BLOOD THINNERS?

           

           

          :NO

           

      DO YOU HAVE ANY BLEEDING DISORDERS?

           

           

          :NO

           

      ANY NEW NUMBNESS OR WEAKNESS IN YOUR LEGS OR ARMS?

           

           

          :NO

           

      ANY PACEMAKER,DEFIBRILLATOR, OR DORSAL COLUMN

          STIMULATOR?

           

           

          :NO

           

      DO YOU HAVE ANY RASHES OR OPEN SORES?

           

           

          :NO

           

      ARE YOU ALLERGIC TO IV DYE?

           

           

          :NO

           

      ARE YOU DIABETIC?

           

           

          :NO

           

      ANY NEW PROBLEMS WITH YOUR MEDICATIONS?

           

           

          :NO

           

      HAVE YOU RECEIVED A VACCINE IN THE PAST 30 DAYS?

           

           

          :NO

           

      DO YOU PLAN TO RECEIVE A VACCINE IN THE NEXT 21

          DAYS?

           

           

          :NO

           

      DO YOU TAKE ANY IMMUNOSUPPRESSIVE MEDICATIONS?

           

           

          :NO

           

      ANY HISTORY OF SEIZURES?

           

           

          :NO

           

      ANY HISTORY OF CARDIAC ISSUES OR EVENTS?

           

           

          :NO

           

      DO YOU HAVE ANY KIDNEY OR LIVER DISEASE?

           

           

          :NO

           

      DO YOU HAVE SLEEP APNEA?

           

           

          :YES

          DO YOU WEAR A CPAP?YES

           

      ANY RECENT HEAD INJURY?

           

           

          :NO

           

      DO YOU HAVE ANY NEW INFECTIONS?

           

           

          :NO

           

      IS THERE A CHANCE YOU COULD BE PREGNANT?

           

           

          :NO

           

      ARE YOU BREAST FEEDING?

           

           

          :NO

           

      WHEN DID YOU LAST EAT?

           

           

          : 2021 1900

           

      WHEN DID YOU LAST DRINK?

           

           

          : 2021 0600

           

      WHAT DID YOU LAST DRINK?

           

           

          : SPRITE

           

      NAME OF PERSON DRIVING YOU HOME?

           

           

          : [employer]

           

      DO YOU HAVE ANY OTHER QUESTIONS OR CONCERNS?

           

           

          : NO

           

CURRENT MEDICATIONS

           

          TAKING CLARITIN 10 MG TABLET 1 TABLET ORALLY ONCE A DAY

          TAKING SEROQUEL 100 MG TABLET 1 TABLET AT BEDTIME ORALLY ONCE A

          DAY

          TAKING BACLOFEN 20 MG TABLET 1 TABLET WITH FOOD OR MILK ORALLY

          THREE TIMES A DAY

          TAKING REMERON 15 MG TABLET 1 TABLET ORALLY ONCE A DAY

          TAKING LISINOPRIL 10 MG TABLET 1 TABLET ORALLY ONCE A DAY, NOTES:

          2021 1900

          NOT-TAKING TRAZODONE  MG TABLET 1 TABLET AT BEDTIME AS

          NEEDED ORALLY ONCE A DAY

          NOT-TAKING IBUPROFEN 800 MG TABLET 1 TABLET WITH FOOD OR MILK AS

          NEEDED ORALLY THREE TIMES A DAY

          NOT-TAKING ESCITALOPRAM OXALATE 20 MG TABLET 0.5 TABLET ORALLY

          ONCE A DAY

          NOT-TAKING HYDROXYZINE HCL 50 MG TABLET 1 TABLET AS NEEDED ORALLY

          TID AS NEEDED

          NOT-TAKING IBUPROFEN 200 MG TABLET 1 TABLET WITH FOOD OR MILK AS

          NEEDED ORALLY THREE TIMES A DAY

          MEDICATION LIST REVIEWED AND RECONCILED WITH THE PATIENT

           

PAST MEDICAL HISTORY

           

          DEPRESSION/ANXIETY

          INSOMNIA

          ASTHMA

          LOW BACK PAIN

           

ALLERGIES

           

          SEASONALE

           

SOCIAL HISTORY

           

          GENERAL:

           

          TOBACCO USE

          ARE YOU A:CURRENT SMOKER

          ARE YOU INTERESTED IN QUITTING?NOT READY TO QUIT

          COUNSELED THE PATIENT ON SMOKING EFFECTS, EDUCATION

          [id number]/08/2020

          HOW MANY CIGARETTES A DAY DO YOU SMOKE?6-10

          HOW SOON AFTER YOU WAKE UP DO YOU SMOKE YOUR FIRST

          CIGARETTE?AFTER 60 MIN

          HOW OFTEN DO YOU SMOKE CIGARETTES?EVERY DAY

          PATIENT COUNSELED ON THE DANGERS OF TOBACCO USE AND URGED TO

          QUIT:2021

          SMOKING CESSATION INFORMATION GIVEN2021

          VAPORNO

          E-CIGARETTENO

           

           

          LATEX QUESTIONNAIRE

          LATEX ALLERGY : HAVE YOU EVER DEVELOPED ANY TYPE OF REACTION

          AFTER HANDLING LATEX PRODUCTS SUCH AS RUBBER GLOVES, CONDOMS,

          DIAPHRAGMS, BALLOONS, SOCKS, OR UNDERWEAR?NO

          LATEX ALLERGY : HAVE YOU EVER DEVELOPED ANY TYPE OF REACTION

          DURING OR AFTER DENTAL APPOINTMENT, VAGINAL/RECTAL EXAMINATION,

          SURGICAL PROCEDURE, OR ANY OTHER EXPOSURE?NO

          LATEX RISK : HAVE YOU EVER HAD ANY DIFFICULTY BREATHING OR HIVES

          AFTER EATING OR HANDLING ANY FRUITS, OR VEGETABLES; SUCH AS KIWI,

          BANANAS, STONE FRUITS, OR CHESTNUTSNO

          LATEX RISK : DO YOU HAVE A PREVIOUS PERSONAL HISTORY OF MORE THAN

          NINE SURGERIES, SPINA BIFIDA, OR REPEATED CATHERIZATIONS? NO

          LATEX RISK : ARE YOU FREQUENTLY EXPOSED TO LATEX PRODUCTS IN YOUR

          OCCUPATION?NO

          DATE ASKED : 2021

           

           

          ALCOHOL USE: NO.

           

           

          ALCOHOL SCREENING

          DID YOU HAVE A DRINK CONTAINING ALCOHOL IN THE PAST YEAR?NO

          POINTS0

          INTERPRETATIONNEGATIVE

           

           

          RECREATIONAL DRUG USE

          DRUG USE?NO

           

           

          CAFFEINE

          CAFFEINE USE?YES 2 SODAS DAILY

           

           

          SEXUAL HX

          HAD SEX IN THE LAST 12 MONTHS (VAGINAL, ORAL, OR ANAL)?YES

          WITHMEN ONLY

          PREVENTION STRATEGIES DISCUSSED:OTHER

          USE PROTECTION?YES

          HOW OFTEN?SOME OF THE TIME

          LMP:3/19/18

          HAVE YOU EVER HAD AN STD?NO

           

           

          HIV / HEP-C SCREENING

          HIV TEST OFFERED TO PATIENT:YES

          DATE OFFERED:2018

          TEST ACCEPTED:YES

          BROCHURE PROVIDED TO PATIENTYES

           

           

          Yazidi

          [id number] NONE

           

           

          LANGUAGE

          LANGUAGES SPOKEN:ENGLISH

           

           

          EDUCATION

          LEVEL OF EDUCATION:HIGH SCHOOL

           

           

          LEARNING BARRIERS / SPECIAL NEEDS

          CHANGE FROM LAST VISIT?NO

          BARRIERS TO LEARNING?YES HAS DIFFICULTY WITH COMPREHENSION PER

          PATIENT.

          COMMENTSDOCUMENTED IN NOTES SECTION>

          HEARING IMPAIRED?NO

          VISION IMPAIRED?YES

          COGNITIVELY IMPAIRED?YES

          :CORRECTIVE LENSES

          :MENTAL RETARDATION

          READINESS TO LEARN?YES

          LEARNING PREFERENCES?YES

          LEARNING CAPABILITIES PRESENT?YES

          EMOTIONAL BARRIERS?NO

          SPECIAL DEVICES?NO

           NEEDED?NO

           

           

          DOMESTIC VIOLENCE

          STATUS:SINGLE

          DO YOU FEEL SAFE IN YOUR ENVIRONMENT?YES

           

           

          OCCUPATION: DIABLED- 

           

           

          MARITAL STATUS: SINGLE.

           

           

          OTHERS AT HOME: EX PARTNER, NOW ROOM MATES.

           

           

          -

          PFS REFERRAL NEEDED?NO

          CLERGY REFERRAL NEEDED?NO

          PUBLIC HEALTH REFERRAL NEEDED?NO

          WAS THE PROVIDER NOTIFIED OF ANY PERTINENT INFO?YES

          HAS THE PATIENT BEEN EDUCATED REGARDING HIS/HER PLAN OF CARE?YES

          HAS THE PATIENT BEEN EDUCATED REGARDING PAIN, THE RISK FOR PAIN,

          THE IMPORTANCE OF EFFECTIVE PAIN MANAGEMENT, AND THE PAIN

          ASSESSMENT PROCESS?YES

           

           

          ADVANCE DIRECTIVE

          ADVANCE DIRECTIVE DISCUSSED WITH PATIENT:YES DOES NOT HAVE

          ADVANCED DIRECTIVES AT THIS TIME. PATIENT GIVEN INFORMATION AND

          PATIENT WILL TAKE HOME TO REVIEW.

           

VITAL SIGNS

           

           LBS, HT 64 IN, BMI 39.99 INDEX, /78 MM HG, HR 95

          /MIN, RR 18 /MIN, TEMP 97.7 F, OXYGEN SAT % 98%, SAFE IN ENV?

          (Y/N) YES, NA INITIALS SC 11:22, REVIEWED BY: LESLEY CARRENO RN.

           

EXAMINATION

           

      GENERAL EXAMINATION: THE PATIENT IS ALERT, ORIENTED

          TIMES THREE AND COOPERATIVE. LUNGS ARE CLEAR TO AUSCULTATION.

          HEART SHOWS REGULAR RHYTHM, NO MURMURS AND NO GALLOPS.

           

ASSESSMENTS

           

          SPONDYLOSIS WITHOUT MYELOPATHY OR RADICULOPATHY, LUMBAR REGION -

          M47.816 (PRIMARY)

           

          SPONDYLOSIS WITHOUT MYELOPATHY OR RADICULOPATHY, LUMBOSACRAL

          REGION - M47.817

           

TREATMENT

           

      SPONDYLOSIS WITHOUT MYELOPATHY OR RADICULOPATHY,

          LUMBAR REGION

          [facility] FACET BLOCK (PAIN)[id number]

          SALINE LOCKMONICA,HOLLY 3/22/2021 11:44:41 AM > #20 SL STARTED X

          2 ND ATTEMPT BY THIS WRITER IN LEFT HAND, SITE ASYMPTOMATIC,

          FLUSHES WELL. PATIENT TOLERATED WELL.

          COMPLETION OF PROCEDURAL VISIT WHEN MEETS CRITERIAHOLLY SERNA

          3/22/2021 12:41:13 PM > CRITERIA MET

           

           

      OTHERS

          NOTES: 3/19/21 1221 PAT COMPLETED. CALDERON HILLS RN BSN.

           

PROCEDURES

           

      PAIN NURSING RECORD

          PROCEDURE    IN ROOM 1159, PHYSICIAN IN ROOM 1213, START 1216,

          FINISH 1225, PHYSICIAN OUT OF ROOM 1227, OUT OF ROOM 1231, ECG

          NORMAL SINUS, PATIENT SHIELDED YES, SAFETY STRAP YES, PREP

          CHLOROPREP BY LESLEY CARRENO RN, DRESSING TEGADERM BY DR CHILDS

          LOC:    HOLLY SERNA 3/22/2021 12:06:08 PM > , 1. ALERT,

          ORIENTED

          RESP:    HOLLY SERNA 3/22/2021 12:06:12 PM > , 1. REGULAR, NO

          DYSPNEA

          COLOR:    HOLLY SERNA 3/22/2021 12:06:16 PM > , 1. PINK

          SKIN:    HOLLY SERNA 3/22/2021 12:06:19 PM > , 1. WARM, DRY

          POSITION:    HOLLY SERNA 3/22/2021 12:06:23 PM > , 1. PRONE

          VITALS:    HOLLY SERNA 3/22/2021 12:06:27 PM > 127/76-80-18-99%

          , HOLLY SERNA 3/22/2021 12:13:19 PM > 125/81-84-18-99%

          2021 1237 125/40-79-%

          NOTES    ISI CARRENO RN

          COMPLETION OF PROCEDURE APPOINTMENT:    POST PAIN 4 BILATERAL LOW

          BACK, DRESSING SITE DRY AND INTACT BILATERAL LOW BACK, IV

          DISCONTINUED, SITE CLEAR, CATHETER INTACT DRY STERILE 2X2 APLLIED

          TO SITE WITH PAPER TAPE, GAIT STEADY, TEACHING COMPLETED, PATIENT

          ACKNOWLEDGES UNDERSTANDING YES PATIENT VERBALIZES UNDERSTANDING

          OF POST PROCEDURE INSTRUCTIONS REVIEWED, PROCEDURE APPOINTMENT

          COMPLETED AT 1240 BY: ISI CARRENO RN

      PN LUMBAR FACET BLOCK DIAGNOSTIC

          PRE PROCEDURE DIAGNOSIS    LUMBAR SPONDYLOSIS, LUMBOSACRAL

          SPONDYLOSIS

          POST PROCEDURE DIAGNOSIS    LUMBAR SPONDYLOSIS, LUMBOSACRAL

          SPONDYLOSIS

          PROCEDURE    BILATERAL L4-L4 AND BILATERAL L5-S1 FACET BLOCK

          DIAGNOSTIC NUMBER 1

          SURGEON    DR. EYAD CHILDS

          ASSISTANT    NONE

          ANESTHESIA    LOCAL

          PRE PROCEDURE NOTE    THE PATIENT WITH HISTORY OF CHRONIC LOW

          BACK PAIN. I EVALUATED THE PATIENT AND REVIEWED THE CHART. I WENT

          OVER THE RISKS, ALTERNATIVES, AND BENEFITS ASSOCIATED WITH THIS

          PROCEDURE. THE PATIENT WOULD LIKE TO PROCEED AND GAVE CONSENT TO

          PERFORM THE PROCEDURE. AS AGREED WITH THE PATIENT, WE ARE DOING

          THIS PROCEDURE TO DETERMINE IF THE PATIENT IS A CANDIDATE FOR A

          RADIOFREQUENCY ABLATION OF THE FACETS JOINTS. THE PATIENT DENIES

          UNEXPLAINABLE WEIGHT LOSS, FEVER, CHILLS, OR NEW CHANGES IN

          URINARY OR BOWEL CONTROL. THE PATIENT IS COVID-19 NEGATIVE

          DESCRIPTION OF PROCEDURE    THE PATIENT WAS BROUGHT TO THE

          PROCEDURE ROOM AND PLACED IN THE PRONE POSITION. THE LUMBOSACRAL

          AREA WAS CLEANED WITH CHLORAPREP SOLUTION AND DRAPED ASEPTICALLY.

          THE PROCEDURE WAS DONE UNDER STERILE CONDITIONS. A TIMEOUT WAS

          PERFORMED WHERE THE CONSENTED SITE WAS VERIFIED WITH EVERYONE IN

          THE ROOM. UNDER FLUOROSCOPIC GUIDANCE, TARGETS WERE SELECTED AT

          THE INTERSECTION OF THE RIGHT AND LEFT TRANSVERSE PROCESS OF L4,

          L5 AND ALA OF S1 WITH ITS RESPECTIVE SUPERIOR ARTICULAR PROCESS

          WITH A TARGET OF THE MEDIAN BRANCHES OF L3, L4 AND THE DORSAL

          RAMI OF L5. I CONFIRMED AGAIN THE SITE OF TARGET. LIDOCAINE WAS

          USED TO NUMB THE SKIN AND THE SUBCUTANEOUS TISSUE BELOW IT.

          SPINAL NEEDLE, 22-GAUGE, WAS ADVANCED UNDER FLUOROSCOPIC GUIDANCE

          AND FOLLOWING PATIENT FEEDBACK UNTIL THE TARGETS WERE REACHED.

          POSITION OF THE NEEDLES WAS VERIFIED WITH AP AND LATERAL VIEWS.

          AFTER PROPER POSITION OF THE NEEDLES WAS ACHIEVED, ISOVUE-M DYE

          30%, 0.1 ML, WAS INJECTED AT EACH SITE SHOWING ADEQUATE SPREAD OF

          THE DYE. THEN, A SOLUTION OF 0.4 ML OF BUPIVACAINE 0.25% WAS

          INJECTED AT EACH SITE. THE MEDICATIONS WERE VERIFIED WITH THE

          NURSE. THERE WAS NO EVIDENCE OF BLOOD, PARESTHESIA OR

          CEREBROSPINAL FLUID DURING THE PROCEDURE. THE PATIENT WAS SENT TO

          THE RECOVERY ROOM. THE PATIENT WAS MOVING THE EXTREMITIES AND

          DOING WELL. THERE WERE NO COMPLICATIONS DURING THE PROCEDURE.

          ESTIMATED BLOOD LOSS WAS LESS THAN 5 ML. FLUOROSCOPY TIME WAS 26

          SECONDS

          POST PROCEDURE NOTE    FOR THE NEXT DIAGNOSTIC TEST, I WOULD LIKE

          TO USE 7 INCH SPINAL NEEDLES. THE PATIENT WILL DOCUMENT THE PAIN

          LEVEL AND RESPONSE TO THIS PROCEDURE PER PAIN DIARY. THE PATIENT

          WILL BE SEEN IN A FOLLOW UP IN THE NEXT FEW WEEKS. FURTHER

          DETERMINATION FOR THE PATIENT'S CASE WILL BE DONE AT THE NEXT

          VISIT. INSTRUCTIONS WERE GIVEN, QUESTIONS WERE ANSWERED, AND THE

          PATIENT EXPRESSED UNDERSTANDING AND AGREED WITH THE PLAN. ESTER SANTOYO DILEONARDO, DOCUMENTED THE ABOVE INFORMATION ACTING AS A

          SCRIBE FOR DR. CHILDS. I HAVE REVIEWED THE ABOVE DOCUMENT,

          WRITTEN BY ESTER KNIGHT, MEDICAL SCRIBE, AND I VERIFY THAT

          IT IS ACCURATE

           

PROCEDURE CODES

           

          33227 INJ PARAVERT F JNT L/S 1 LEV, MODIFIERS: 50

           

          [id number] INJ PARAVERT F JNT L/S 2 LEV, MODIFIERS: 50

           

DISPOSITION & COMMUNICATION

           

FOLLOW UP

           

          FOLLOW UP WITH NP (REASON: POST BILATERAL DIAGNOSTIC LUMBAR FACET

          BLOCK #1 L4-L5, L5-S1)

           

 

ELECTRONICALLY SIGNED BY EYAD CHILDS MD, MD ON

          2021 AT 04:29 PM EDT

           

           

           

 

DISCLAIMER :

THIS IS A VISIT SUMMARY EXTRACTED FROM THE [employer] CHART.

IT IS NOT A COPY OF THE [employer] PROGRESS NOTE.

RAMÓN

## 2021-03-26 ENCOUNTER — HOSPITAL ENCOUNTER (OUTPATIENT)
Dept: HOSPITAL 53 - M RAD | Age: 31
End: 2021-03-26
Attending: NURSE PRACTITIONER
Payer: COMMERCIAL

## 2021-03-26 DIAGNOSIS — R91.8: Primary | ICD-10-CM

## 2021-03-26 NOTE — REP
INDICATION:

OTHER NON SPECIFIC ABNORMAL FINDING OF LUNG FIELD.



COMPARISON:

PA and lateral chest dated 01/25/2021, outside study from [facility].



TECHNIQUE:

CT of the chest without IV contrast.



FINDINGS:

There are no infiltrates.  There are no pleural effusions.

There are no nodules or masses.

There is no mediastinal or axillary lymph node enlargement.  In the absence of IV

contrast the study is insensitive for hilar lymph node enlargement.

The thoracic aorta is unremarkable.  Cardiac size is normal.  No pericardial effusion.

The visualized unenhanced upper abdominal contents are unremarkable.



IMPRESSION:

Essentially negative CT study of the chest without IV contrast.





<Electronically signed by Dae Baumann > 03/26/21 [id number]

## 2021-04-21 ENCOUNTER — HOSPITAL ENCOUNTER (OUTPATIENT)
Dept: HOSPITAL 53 - M PAIN | Age: 31
End: 2021-04-21
Attending: NURSE PRACTITIONER
Payer: COMMERCIAL

## 2021-04-21 DIAGNOSIS — G47.00: ICD-10-CM

## 2021-04-21 DIAGNOSIS — G89.29: Primary | ICD-10-CM

## 2021-04-21 DIAGNOSIS — Z79.899: ICD-10-CM

## 2021-04-21 DIAGNOSIS — F17.210: ICD-10-CM

## 2021-04-21 DIAGNOSIS — Z86.59: ICD-10-CM

## 2021-04-21 DIAGNOSIS — J45.909: ICD-10-CM

## 2021-04-23 NOTE — ECWPNPC
PATIENT NAME: ISAIAH RUSSELL

: 1990

GENDER: FEMALE

MRN: [id number]

VISIT DATE: 2021

DISCHARGE DATE: 21 1517

VISIT LOCKED DATE TIME: 

PHYSICIAN: CHARLEY GUTIÉRREZ 

PHYSICIAN PAGER NO: INACTIVE

RESOURCE: CHARLEY GUTIÉRREZ 

 

           

           

REASON FOR APPOINTMENT

           

          1. POST BILATERAL DIAGNOSTIC LUMBAR FACET BLOCK L4-L5,L5-S1

           

HISTORY OF PRESENT ILLNESS

           

      GENERAL:

           31-YEAR-OLD FEMALE IN FOR POST BILATERAL DIAGNOSTIC LUMBAR FACET

          BLOCK FOLLOW-UP. PATIENT FEELS THE PROCEDURE DECREASED HER PAIN

          GREATER THAN 50% FOR APPROXIMATELY 6 HOURS. SHE RATES HER PAIN

          CURRENTLY AT AN 8 OUT OF 10 AND DESCRIBES IT AS ACHING AND

          THROBBING.

           

      FALL RISK SCREENING:

      SCREENING

          : NO FALLS REPORTED IN THE LAST YEAR.

           

      PAIN SCREENING:

      PATIENT HAS A COMPLAINT OF ACUTE OR CHRONIC PAIN

           

           

          :YES

          LOCATION OF PAIN:LOW BACK

          INTENSITY OF PAIN (SCALE OF 1 TO 10):8

          WHAT DOES YOUR PAIN FEEL LIKE:ACHING, THROBBING

          DURATION:CONTINOUS, CONSTANT, ALL DAY

          PAIN IS INCREASED BY:ACTIVITIES, PROLONGED STANDING

          PAIN IS DECREASED BY:OTHERS LAYING DOWN

           

      NURSING NOTE:

           -.

           

      PAIN CENTER INTAKE QUESTIONS:

      DO YOU HAVE A HISTORY OF MRSA?

           

           

          :NO

           

      DO YOU TAKE A BLOOD THINNERS?

           

           

          :NO

           

      DO YOU HAVE ANY BLEEDING DISORDERS?

           

           

          :NO

           

      ANY NEW NUMBNESS OR WEAKNESS IN YOUR LEGS OR ARMS?

           

           

          :NO

           

      ANY PACEMAKER,DEFIBRILLATOR, OR DORSAL COLUMN

          STIMULATOR?

           

           

          :NO

           

      DO YOU HAVE ANY RASHES OR OPEN SORES?

           

           

          :NO

           

      ARE YOU ALLERGIC TO IV DYE?

           

           

          :NO

           

      ARE YOU DIABETIC?

           

           

          :NO

           

      ANY NEW PROBLEMS WITH YOUR MEDICATIONS?

           

           

          :NO

           

      HAVE YOU RECEIVED A VACCINE IN THE PAST 30 DAYS?

           

           

          :NO 2ND COVID 4/10/2021

           

      DO YOU PLAN TO RECEIVE A VACCINE IN THE NEXT 21

          DAYS?

           

           

          :NO

           

      DO YOU NEED ANY PRESCRIPTION?

           

           

          :NO

           

      DO YOU TAKE ANY IMMUNOSUPPRESSIVE MEDICATIONS?

           

           

          :NO

           

      IS THERE A CHANCE YOU COULD BE PREGNANT?

           

           

          :NO

           

      ARE YOU BREAST FEEDING?

           

           

          :NO

           

CURRENT MEDICATIONS

           

          TAKING CLARITIN 10 MG TABLET 1 TABLET ORALLY ONCE A DAY

          TAKING SEROQUEL 100 MG TABLET 1 TABLET AT BEDTIME ORALLY ONCE A

          DAY

          TAKING BACLOFEN 20 MG TABLET 1 TABLET WITH FOOD OR MILK ORALLY

          THREE TIMES A DAY

          TAKING REMERON 15 MG TABLET 1 TABLET ORALLY ONCE A DAY

          TAKING LISINOPRIL 10 MG TABLET 1 TABLET ORALLY ONCE A DAY

          TAKING CHANTIX 1 TAB ORAL , NOTES: NOT SURE OF DOSE

          NOT-TAKING TRAZODONE  MG TABLET 1 TABLET AT BEDTIME AS

          NEEDED ORALLY ONCE A DAY

          NOT-TAKING IBUPROFEN 800 MG TABLET 1 TABLET WITH FOOD OR MILK AS

          NEEDED ORALLY THREE TIMES A DAY

          NOT-TAKING ESCITALOPRAM OXALATE 20 MG TABLET 0.5 TABLET ORALLY

          ONCE A DAY

          NOT-TAKING HYDROXYZINE HCL 50 MG TABLET 1 TABLET AS NEEDED ORALLY

          TID AS NEEDED

          NOT-TAKING IBUPROFEN 200 MG TABLET 1 TABLET WITH FOOD OR MILK AS

          NEEDED ORALLY THREE TIMES A DAY

          MEDICATION LIST REVIEWED AND RECONCILED WITH THE PATIENT

           

PAST MEDICAL HISTORY

           

          DEPRESSION/ANXIETY

          INSOMNIA

          ASTHMA

          LOW BACK PAIN

           

ALLERGIES

           

          SEASONALE: RUNNY NOSE - ALLERGY

           

SOCIAL HISTORY

           

          GENERAL:

           

          TOBACCO USE

          ARE YOU A:CURRENT SMOKER

          ARE YOU INTERESTED IN QUITTING?THINKING ABOUT QUITTING

          COUNSELED THE PATIENT ON SMOKING CESSATION, EDUCATION

          [id number]/21/2021

          HOW MANY CIGARETTES A DAY DO YOU SMOKE?6-10

          HOW SOON AFTER YOU WAKE UP DO YOU SMOKE YOUR FIRST

          CIGARETTE?AFTER 60 MIN

          HOW OFTEN DO YOU SMOKE CIGARETTES?EVERY DAY

          PATIENT COUNSELED ON THE DANGERS OF TOBACCO USE AND URGED TO

          QUIT:2021

          SMOKING CESSATION INFORMATION GIVEN2021

          VAPORNO

          E-CIGARETTENO

           

           

          LATEX QUESTIONNAIRE

          LATEX ALLERGY : HAVE YOU EVER DEVELOPED ANY TYPE OF REACTION

          AFTER HANDLING LATEX PRODUCTS SUCH AS RUBBER GLOVES, CONDOMS,

          DIAPHRAGMS, BALLOONS, SOCKS, OR UNDERWEAR?NO

          LATEX ALLERGY : HAVE YOU EVER DEVELOPED ANY TYPE OF REACTION

          DURING OR AFTER DENTAL APPOINTMENT, VAGINAL/RECTAL EXAMINATION,

          SURGICAL PROCEDURE, OR ANY OTHER EXPOSURE?NO

          LATEX RISK : HAVE YOU EVER HAD ANY DIFFICULTY BREATHING OR HIVES

          AFTER EATING OR HANDLING ANY FRUITS, OR VEGETABLES; SUCH AS KIWI,

          BANANAS, STONE FRUITS, OR CHESTNUTSNO

          LATEX RISK : DO YOU HAVE A PREVIOUS PERSONAL HISTORY OF MORE THAN

          NINE SURGERIES, SPINA BIFIDA, OR REPEATED CATHERIZATIONS? NO

          LATEX RISK : ARE YOU FREQUENTLY EXPOSED TO LATEX PRODUCTS IN YOUR

          OCCUPATION?NO

          DATE ASKED : 2021

           

           

          ALCOHOL USE: NO.

           

           

          ALCOHOL SCREENING

          DID YOU HAVE A DRINK CONTAINING ALCOHOL IN THE PAST YEAR?NO

          POINTS0

          INTERPRETATIONNEGATIVE

           

           

          RECREATIONAL DRUG USE

          DRUG USE?NO

           

           

          CAFFEINE

          CAFFEINE USE?YES 2 SODAS DAILY

           

           

          SEXUAL HX

          HAD SEX IN THE LAST 12 MONTHS (VAGINAL, ORAL, OR ANAL)?YES

          WITHMEN ONLY

          PREVENTION STRATEGIES DISCUSSED:OTHER

          USE PROTECTION?YES

          HOW OFTEN?SOME OF THE TIME

          LMP:3/19/18

          HAVE YOU EVER HAD AN STD?NO

           

           

          HIV / HEP-C SCREENING

          HIV TEST OFFERED TO PATIENT:YES

          DATE OFFERED:2018

          TEST ACCEPTED:YES

          BROCHURE PROVIDED TO PATIENTYES

           

           

          Pentecostal

          [id number] NONE

           

           

          LANGUAGE

          LANGUAGES SPOKEN:ENGLISH

           

           

          EDUCATION

          LEVEL OF EDUCATION:HIGH SCHOOL

           

           

          LEARNING BARRIERS / SPECIAL NEEDS

          CHANGE FROM LAST VISIT?NO

          BARRIERS TO LEARNING?YES HAS DIFFICULTY WITH COMPREHENSION PER

          PATIENT.

          COMMENTSDOCUMENTED IN NOTES SECTION>

          HEARING IMPAIRED?NO

          VISION IMPAIRED?YES

          :CORRECTIVE LENSES

          COGNITIVELY IMPAIRED?YES

          :MENTAL RETARDATION

          READINESS TO LEARN?YES

          LEARNING PREFERENCES?YES

          LEARNING CAPABILITIES PRESENT?YES

          EMOTIONAL BARRIERS?NO

          SPECIAL DEVICES?NO

           NEEDED?NO

           

           

          DOMESTIC VIOLENCE

          STATUS:SINGLE

          DO YOU FEEL SAFE IN YOUR ENVIRONMENT?YES

           

           

          OCCUPATION: DIABLED- 

           

           

          MARITAL STATUS: SINGLE.

           

           

          OTHERS AT HOME: EX PARTNER, NOW ROOM MATES.

           

           

          -

          PFS REFERRAL NEEDED?NO

          CLERGY REFERRAL NEEDED?NO

          PUBLIC HEALTH REFERRAL NEEDED?NO

          WAS THE PROVIDER NOTIFIED OF ANY PERTINENT INFO?YES

          HAS THE PATIENT BEEN EDUCATED REGARDING HIS/HER PLAN OF CARE?YES

          HAS THE PATIENT BEEN EDUCATED REGARDING PAIN, THE RISK FOR PAIN,

          THE IMPORTANCE OF EFFECTIVE PAIN MANAGEMENT, AND THE PAIN

          ASSESSMENT PROCESS?YES

           

           

          ADVANCE DIRECTIVE

          ADVANCE DIRECTIVE DISCUSSED WITH PATIENT:YES DOES NOT HAVE

          ADVANCED DIRECTIVES AT THIS TIME. PATIENT GIVEN INFORMATION AND

          PATIENT WILL TAKE HOME TO REVIEW.

           

REVIEW OF SYSTEMS

           

      CONSTITUTIONAL:

           

          ANY RECENT FEVER    NO . CHILLS    NO . WEIGHT CHANGE OF UNKNOWN

          REASONS    NO .

           

      GASTROENTEROLOGY:

           

          NEW UNEXPLAINABLE CHANGES IN BOWEL CONTROL    NO . CONSTIPATION  

           NO .

           

      GENITOURINARY:

           

          ANY NEW CHANGE IN BLADDER CONTROL?    NO .

           

      NEUROLOGY:

           

          NEW ONSET DIZZINESS OR NEUROLOGICAL CHANGES NOT MENTIONED    NO .

          NEW NUMBNESS OR PAIN PATTERNS NOT MENTIONED AND PERTINENT TO

          TODAY'S VISIT    NO .

           

      CARDIOLOGY:

           

          NEW CHEST PRESSURE    NO . PATIENT DENIES    NO .

           

      RESPIRATORY:

           

          UNEXPLAINABLE COUGH    NO . NEW SHORTNESS OF BREATH    NO .

           

VITAL SIGNS

           

           LBS, HT 64 IN, BMI 39.99 INDEX, /82 MM HG, 

          /MIN, RR 18 /MIN, TEMP 97.3 F, OXYGEN SAT % 97%, SAFE IN ENV?

          (Y/N) YEST.REED SANTOS.

           

EXAMINATION

           

      GENERAL EXAMINATION:

          GENERALNO ACUTE DISTRESS, WELL NOURISHED AND HYDRATED.

           

          PSYCHAPPROPRIATE MOOD AND AFFECT .

           

          LUNGS:CLEAR TO AUSCULTATION BILATERALLY, NO WHEEZES, RHONCHI,

          RALES.

           

          HEART:NO MURMURS, REGULAR RATE AND RHYTHM.

           

ASSESSMENTS

           

          OTHER CHRONIC PAIN - G89.29 (PRIMARY)

           

TREATMENT

           

      OTHER CHRONIC PAIN

          START CELEBREX CAPSULE, 100 MG, 1 CAPSULE WITH FOOD, ORALLY, ONCE

          A DAY, 30 DAY(S), 30

          PAIN PROCEDURE LOGDATE OF PROCEDURE3/22/2021PROCEDURE:BILATERAL

          DIAGNOSTIC LUMBAR FACET BLOCK #1 L4-L5,L5-D0IXQRFD OF PRE

          SEDATE0/0RESULT:GREATER THAN 50% RELIEF X 6 HOURS

          NOTES: 31-YEAR-OLD FEMALE IN FOR POST BILATERAL DIAGNOSTIC LUMBAR

          FACET BLOCK FOLLOW-UP. GIVEN PRESENTING SYMPTOMS RECOMMEND

          STARTING CELEBREX AND ORDERING LUMBAR DIAGNOSTIC FACET BLOCK #2.

          PATIENT HAS EXPRESSED UNDERSTANDING OF AND WAS IN AGREEMENT WITH

          TREATMENT PLAN. GIVEN TIME TO ASK QUESTIONS AND EXPRESS CONCERNS.

           

           

           

          PRINTED AND REVIEWED PRE PROCEDURE TEACHING, PATIENT VERBALIZED

          UNDERSTANDING. [employer] PRINTED NEW MEDICATION CELEBREX INFORMATION

          FOR PATIENT JASMINE SANTOS.

           

DISPOSITION & COMMUNICATION

           

FOLLOW UP

           

          POST PROCEDURE (REASON: LEFT DIAGNOSTIC LUMBAR FACET BLOCK #2

          L4-L5, L5-S1)

           

 

ELECTRONICALLY SIGNED BY JUVENTINO BAUER ON

          2021 AT 10:01 AM EDT

           

           

           

 

DISCLAIMER :

THIS IS A VISIT SUMMARY EXTRACTED FROM THE [employer] CHART.

IT IS NOT A COPY OF THE [employer] PROGRESS NOTE.

RAMÓN

## 2021-05-05 ENCOUNTER — HOSPITAL ENCOUNTER (OUTPATIENT)
Dept: HOSPITAL 53 - M LABSMTC | Age: 31
End: 2021-05-05
Attending: ANESTHESIOLOGY
Payer: COMMERCIAL

## 2021-05-05 DIAGNOSIS — Z11.52: Primary | ICD-10-CM

## 2021-05-19 ENCOUNTER — HOSPITAL ENCOUNTER (OUTPATIENT)
Dept: HOSPITAL 53 - M LABSMTC | Age: 31
End: 2021-05-19
Attending: ANESTHESIOLOGY
Payer: COMMERCIAL

## 2021-05-19 DIAGNOSIS — Z20.822: Primary | ICD-10-CM

## 2021-05-26 ENCOUNTER — HOSPITAL ENCOUNTER (OUTPATIENT)
Dept: HOSPITAL 53 - M PAIN | Age: 31
End: 2021-05-26
Attending: NURSE PRACTITIONER
Payer: COMMERCIAL

## 2021-05-26 DIAGNOSIS — J45.909: ICD-10-CM

## 2021-05-26 DIAGNOSIS — F41.9: ICD-10-CM

## 2021-05-26 DIAGNOSIS — G47.00: ICD-10-CM

## 2021-05-26 DIAGNOSIS — M47.817: Primary | ICD-10-CM

## 2021-05-26 DIAGNOSIS — M54.5: ICD-10-CM

## 2021-05-26 DIAGNOSIS — F32.9: ICD-10-CM

## 2021-05-26 DIAGNOSIS — Z79.899: ICD-10-CM

## 2021-05-28 NOTE — ECWPNPC
PATIENT NAME: ISAIAH RUSSELL

: 1990

GENDER: FEMALE

MRN: [id number]

VISIT DATE: 2021

DISCHARGE DATE: 21 1505

VISIT LOCKED DATE TIME: 

PHYSICIAN: CHARLEY GUTIÉRREZ 

PHYSICIAN PAGER NO: INACTIVE

RESOURCE: CHARLEY GUTIÉRREZ 

 

           

           

REASON FOR APPOINTMENT

           

          1. DISCUSS PROCEDURES/CARE PLAN

           

HISTORY OF PRESENT ILLNESS

           

      PAIN CENTER INTAKE QUESTIONS:

      DO YOU HAVE A HISTORY OF MRSA?

           

           

          :NO

           

      DO YOU TAKE A BLOOD THINNERS?

           

           

          :NO

           

      DO YOU HAVE ANY BLEEDING DISORDERS?

           

           

          :NO

           

      ANY NEW NUMBNESS OR WEAKNESS IN YOUR LEGS OR ARMS?

           

           

          :NO

           

      ANY PACEMAKER,DEFIBRILLATOR, OR DORSAL COLUMN

          STIMULATOR?

           

           

          :NO

           

      DO YOU HAVE ANY RASHES OR OPEN SORES?

           

           

          :NO

           

      ARE YOU ALLERGIC TO IV DYE?

           

           

          :NO

           

      ARE YOU DIABETIC?

           

           

          :NO

           

      ANY NEW PROBLEMS WITH YOUR MEDICATIONS?

           

           

          :NO

           

      HAVE YOU RECEIVED A VACCINE IN THE PAST 30 DAYS?

           

           

          :NO

           

      DO YOU PLAN TO RECEIVE A VACCINE IN THE NEXT 21

          DAYS?

           

           

          :NO

           

      DO YOU NEED ANY PRESCRIPTION?

           

           

          :NO

           

      DO YOU TAKE ANY IMMUNOSUPPRESSIVE MEDICATIONS?

           

           

          :NO

           

      DO YOU HAVE ANY KIDNEY OR LIVER DISEASE?

           

           

          :NO

           

      IS THERE A CHANCE YOU COULD BE PREGNANT?

           

           

          :NO

           

      ARE YOU BREAST FEEDING?

           

           

          :NO

           

      GENERAL:

      HPI

          31-YEAR-OLD FEMALE IN FOR CHRONIC PAIN FOLLOW-UP. SHE RATES HER

          PAIN CURRENTLY AT A 10 OUT OF 10 AND DESCRIBES IT AS CONTINUOUS,

          AND SHARP. PATIENT WAS SCHEDULED TO HAVE A DIAGNOSTIC FACET BLOCK

          PERFORMED HOWEVER THERE IS A TRANSPORTATION ISSUE AND SHE HAD TO

          CANCEL SAID APPOINTMENT..

           

           -.

           

      FALL RISK SCREENING:

      SCREENING

          : NO FALLS REPORTED IN THE LAST YEAR.

           

      PAIN SCREENING:

      PATIENT HAS A COMPLAINT OF ACUTE OR CHRONIC PAIN

           

           

          :YES

          LOCATION OF PAIN:MID BACK, LOW BACK

          INTENSITY OF PAIN (SCALE OF 1 TO 10):10

          WHAT DOES YOUR PAIN FEEL LIKE:CONTINOUS, SHARP

           

      NURSING NOTE:

           -.

           

CURRENT MEDICATIONS

           

          TAKING CELEBREX 100 MG CAPSULE 1 CAPSULE WITH FOOD ORALLY ONCE A

          DAY

          TAKING CLARITIN 10 MG TABLET 1 TABLET ORALLY ONCE A DAY

          TAKING SEROQUEL 100 MG TABLET 1 TABLET AT BEDTIME ORALLY ONCE A

          DAY

          TAKING REMERON 15 MG TABLET 1 TABLET ORALLY ONCE A DAY

          TAKING LISINOPRIL 10 MG TABLET 1 TABLET ORALLY ONCE A DAY

          TAKING CHANTIX 1 TAB ORAL , NOTES: NOT SURE OF DOSE

          TAKING BACLOFEN 20 MG TABLET 1 TABLET WITH FOOD OR MILK ORALLY

          THREE TIMES A DAY

          NOT-TAKING TRAZODONE  MG TABLET 1 TABLET AT BEDTIME AS

          NEEDED ORALLY ONCE A DAY

          NOT-TAKING IBUPROFEN 800 MG TABLET 1 TABLET WITH FOOD OR MILK AS

          NEEDED ORALLY THREE TIMES A DAY

          NOT-TAKING ESCITALOPRAM OXALATE 20 MG TABLET 0.5 TABLET ORALLY

          ONCE A DAY

          NOT-TAKING HYDROXYZINE HCL 50 MG TABLET 1 TABLET AS NEEDED ORALLY

          TID AS NEEDED

          NOT-TAKING IBUPROFEN 200 MG TABLET 1 TABLET WITH FOOD OR MILK AS

          NEEDED ORALLY THREE TIMES A DAY

          MEDICATION LIST REVIEWED AND RECONCILED WITH THE PATIENT

           

PAST MEDICAL HISTORY

           

          DEPRESSION/ANXIETY

          INSOMNIA

          ASTHMA

          LOW BACK PAIN

           

ALLERGIES

           

          SEASONALE: RUNNY NOSE - ALLERGY

           

SURGICAL HISTORY

           

          TUBAL PREGNANCY- SYR 

           

REVIEW OF SYSTEMS

           

      CONSTITUTIONAL:

           

          ANY RECENT FEVER    NO . CHILLS    NO . WEIGHT CHANGE OF UNKNOWN

          REASONS    NO .

           

      GASTROENTEROLOGY:

           

          NEW UNEXPLAINABLE CHANGES IN BOWEL CONTROL    NO . CONSTIPATION  

           NO .

           

      GENITOURINARY:

           

          ANY NEW CHANGE IN BLADDER CONTROL?    NO .

           

      NEUROLOGY:

           

          NEW ONSET DIZZINESS OR NEUROLOGICAL CHANGES NOT MENTIONED    NO .

          NEW NUMBNESS OR PAIN PATTERNS NOT MENTIONED AND PERTINENT TO

          TODAY'S VISIT    NO .

           

      CARDIOLOGY:

           

          NEW CHEST PRESSURE    NO . PATIENT DENIES    NO .

           

      RESPIRATORY:

           

          UNEXPLAINABLE COUGH    NO . NEW SHORTNESS OF BREATH    NO .

           

VITAL SIGNS

           

           LBS, HT 64 IN, BMI 41.02 INDEX, /75 MM HG, HR 95

          /MIN, RR 18 /MIN, TEMP 97.0 F, OXYGEN SAT % 98%, SAFE IN ENV?

          (Y/N) YES, NA INITIALS SC 14:31, REVIEWED BY: LUDWIG.

           

EXAMINATION

           

      GENERAL EXAMINATION:

          GENERALNO ACUTE DISTRESS, WELL NOURISHED AND HYDRATED.

           

          PSYCHAPPROPRIATE MOOD AND AFFECT .

           

          LUNGS:CLEAR TO AUSCULTATION BILATERALLY, NO WHEEZES, RHONCHI,

          RALES.

           

          HEART:NO MURMURS, REGULAR RATE AND RHYTHM.

           

ASSESSMENTS

           

          SPONDYLOSIS WITHOUT MYELOPATHY OR RADICULOPATHY, LUMBOSACRAL

          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

           

TREATMENT

           

      SPONDYLOSIS WITHOUT MYELOPATHY OR RADICULOPATHY,

          LUMBOSACRAL REGION

          NOTES: 31-YEAR-OLD FEMALE IN FOR CHRONIC PAIN FOLLOW-UP. GIVEN

          PRESENTING SYMPTOMS RECOMMEND RIGHT-SIDED DIAGNOSTIC LUMBAR FACET

          BLOCK #2 L4-L5 L5-S1 WITH POST PROCEDURAL FOLLOW-UP. PATIENT HAS

          EXPRESSED UNDERSTANDING OF AND WAS IN AGREEMENT WITH TREATMENT

          PLAN. GIVEN TIME TO ASK QUESTIONS AND EXPRESS CONCERNS.

           

PROCEDURE CODES

           

           ESTABILISHED PATIENT [facility] FACILITY CHARGE

           

DISPOSITION & COMMUNICATION

           

FOLLOW UP

           

          POST PROCEDURE (REASON: RIGHT DIAGNOSTIC LUMBAR FACET BLOCK #2

          L4-L5, L5-S1)

           

 

ELECTRONICALLY SIGNED BY JUVENTINO BAUER ON

          2021 AT 08:56 AM EDT

           

           

           

 

DISCLAIMER :

THIS IS A VISIT SUMMARY EXTRACTED FROM THE [employer] CHART.

IT IS NOT A COPY OF THE [employer] PROGRESS NOTE.

RAMÓN

## 2021-06-25 ENCOUNTER — HOSPITAL ENCOUNTER (OUTPATIENT)
Dept: HOSPITAL 53 - M LABSMTC | Age: 31
End: 2021-06-25
Attending: ANESTHESIOLOGY
Payer: COMMERCIAL

## 2021-06-25 DIAGNOSIS — Z01.812: Primary | ICD-10-CM

## 2021-06-25 DIAGNOSIS — Z20.822: ICD-10-CM

## 2021-06-30 ENCOUNTER — HOSPITAL ENCOUNTER (OUTPATIENT)
Dept: HOSPITAL 53 - M PAIN | Age: 31
End: 2021-06-30
Attending: ANESTHESIOLOGY
Payer: COMMERCIAL

## 2021-06-30 DIAGNOSIS — J45.909: ICD-10-CM

## 2021-06-30 DIAGNOSIS — Z79.899: ICD-10-CM

## 2021-06-30 DIAGNOSIS — G47.00: ICD-10-CM

## 2021-06-30 DIAGNOSIS — F17.210: ICD-10-CM

## 2021-06-30 DIAGNOSIS — F41.9: ICD-10-CM

## 2021-06-30 DIAGNOSIS — M47.26: Primary | ICD-10-CM

## 2021-06-30 DIAGNOSIS — F32.9: ICD-10-CM

## 2021-06-30 PROCEDURE — 64493 INJ PARAVERT F JNT L/S 1 LEV: CPT

## 2021-06-30 PROCEDURE — 64494 INJ PARAVERT F JNT L/S 2 LEV: CPT

## 2021-06-30 NOTE — REP
INDICATION:

BILAT LFBD.



COMPARISON:

None.



TECHNIQUE:

Five views.  46.8 seconds of fluoroscopy time is reported.



FINDINGS:

A sequence of 5 last image hold fluoroscopically obtained spot radiograph(s) of the

lumbar spine document(s) needle position(s) and contrast injection associated with

injection procedure.



IMPRESSION:

Procedural imaging.





<Electronically signed by Carlos Jones > 06/30/21 2212

## 2021-07-01 NOTE — ECWPNPC
PATIENT NAME: ISAIAH RUSSELL

: 1990

GENDER: FEMALE

MRN: [id number]

VISIT DATE: 2021

DISCHARGE DATE: 21 1436

VISIT LOCKED DATE TIME: 

PHYSICIAN: EYAD CHILDS MD

PHYSICIAN PAGER NO: INACTIVE

RESOURCE: EYAD CHILDS MD

 

           

           

REASON FOR APPOINTMENT

           

          1. BILATERAL DIAGNOSTIC LUMBAR FACET BLOCK #2 L4-L5, L5-S1

           

HISTORY OF PRESENT ILLNESS

           

      GENERAL:

           31-YEAR-OLD FEMALE PATIENT WITH A HISTORY OF CHRONIC LOW BACK

          PAIN. THE PATIENT DESCRIBES THE PAIN AS ACHING AND SEVERE WITH A

          PAIN SCORE RANGING FROM 7-10/10 . WE DID A DIAGNOSTIC TEST

          BILATERAL AND THE PAIN WENT DOWN MORE THAN 80%. THE PAIN HAS COME

          BACK. THE PAIN IS AFFECTING HER ABILITY TO DO ACTIVITIES SUCH AS

          CLEANING HER HOUSE AND GROCERY SHOPPING.

           

      FALL RISK SCREENING:

      SCREENING

          : NO FALLS REPORTED IN THE LAST YEAR.

           

      PAIN SCREENING:

      PATIENT HAS A COMPLAINT OF ACUTE OR CHRONIC PAIN

           

           

          :YES

          LOCATION OF PAIN:UPPER BACK, LOW BACK

          INTENSITY OF PAIN (SCALE OF 1 TO 10):8

          WHAT DOES YOUR PAIN FEEL LIKE:ACHING, SHARP, THROBBING

          DURATION:CONTINOUS, CONSTANT

          PAIN IS INCREASED BY:ACTIVITIES

          PAIN IS DECREASED BY:USE OF PAIN MEDICATIONS

           

      NURSING NOTE:

           -.

           

      PAIN CENTER INTAKE QUESTIONS:

      DO YOU HAVE A HISTORY OF MRSA?

           

           

          :NO

           

      DO YOU TAKE A BLOOD THINNERS?

           

           

          :NO

           

      DO YOU HAVE ANY BLEEDING DISORDERS?

           

           

          :NO

           

      ANY NEW NUMBNESS OR WEAKNESS IN YOUR LEGS OR ARMS?

           

           

          :NO

           

      ANY PACEMAKER,DEFIBRILLATOR, OR DORSAL COLUMN

          STIMULATOR?

           

           

          :NO

           

      DO YOU HAVE ANY RASHES OR OPEN SORES?

           

           

          :NO

           

      ARE YOU ALLERGIC TO IV DYE?

           

           

          :NO

           

      ARE YOU DIABETIC?

           

           

          :NO

           

      ANY NEW PROBLEMS WITH YOUR MEDICATIONS?

           

           

          :NO

           

      HAVE YOU RECEIVED A VACCINE IN THE PAST 30 DAYS?

           

           

          :NO

           

      DO YOU PLAN TO RECEIVE A VACCINE IN THE NEXT 21

          DAYS?

           

           

          :NO

           

      DO YOU TAKE ANY IMMUNOSUPPRESSIVE MEDICATIONS?

           

           

          :NO

           

      ANY HISTORY OF SEIZURES?

           

           

          :NO

           

      ANY HISTORY OF CARDIAC ISSUES OR EVENTS?

           

           

          :NO

           

      DO YOU HAVE ANY KIDNEY OR LIVER DISEASE?

           

           

          :NO

           

      DO YOU HAVE SLEEP APNEA?

           

           

          :YES

          DO YOU WEAR A CPAP?YES

           

      ANY RECENT HEAD INJURY?

           

           

          :NO

           

      DO YOU HAVE ANY NEW INFECTIONS?

           

           

          :NO

           

      IS THERE A CHANCE YOU COULD BE PREGNANT?

           

           

          :NO

           

      ARE YOU BREAST FEEDING?

           

           

          :NO

           

      WHEN DID YOU LAST EAT?

           

           

          : 21

           

      WHEN DID YOU LAST DRINK?

           

           

          : 21 0830

           

      WHAT DID YOU LAST DRINK?

           

           

          : SPRITE

           

      NAME OF PERSON DRIVING YOU HOME?

           

           

          : [employer]

           

      DO YOU HAVE ANY OTHER QUESTIONS OR CONCERNS?

           

           

          : -

           

CURRENT MEDICATIONS

           

          TAKING CELEBREX 100 MG CAPSULE 1 CAPSULE WITH FOOD ORALLY ONCE A

          DAY

          TAKING SEROQUEL 100 MG TABLET 1 TABLET AT BEDTIME ORALLY ONCE A

          DAY

          TAKING REMERON 15 MG TABLET 1 TABLET ORALLY ONCE A DAY

          TAKING LISINOPRIL 10 MG TABLET 1 TABLET ORALLY ONCE A DAY, NOTES:

          21

          TAKING BACLOFEN 20 MG TABLET 1 TABLET WITH FOOD OR MILK ORALLY

          THREE TIMES A DAY, NOTES: 21

          TAKING FEXOFENADINE  MG TABLET 1 TABLET ORALLY ONCE A DAY

          NOT-TAKING CLARITIN 10 MG TABLET 1 TABLET ORALLY ONCE A DAY

          NOT-TAKING CHANTIX 1 TAB ORAL , NOTES: NOT SURE OF DOSE

          NOT-TAKING TRAZODONE  MG TABLET 1 TABLET AT BEDTIME AS

          NEEDED ORALLY ONCE A DAY

          NOT-TAKING IBUPROFEN 800 MG TABLET 1 TABLET WITH FOOD OR MILK AS

          NEEDED ORALLY THREE TIMES A DAY

          NOT-TAKING ESCITALOPRAM OXALATE 20 MG TABLET 0.5 TABLET ORALLY

          ONCE A DAY

          NOT-TAKING HYDROXYZINE HCL 50 MG TABLET 1 TABLET AS NEEDED ORALLY

          TID AS NEEDED

          NOT-TAKING IBUPROFEN 200 MG TABLET 1 TABLET WITH FOOD OR MILK AS

          NEEDED ORALLY THREE TIMES A DAY

          MEDICATION LIST REVIEWED AND RECONCILED WITH THE PATIENT

           

PAST MEDICAL HISTORY

           

          DEPRESSION/ANXIETY

          INSOMNIA

          ASTHMA

          LOW BACK PAIN

           

ALLERGIES

           

          SEASONALE: RUNNY NOSE - ALLERGY

           

SOCIAL HISTORY

           

          GENERAL:

           

          TOBACCO USE

          ARE YOU A:CURRENT SMOKER

          HOW OFTEN DO YOU SMOKE CIGARETTES?EVERY DAY

          HOW SOON AFTER YOU WAKE UP DO YOU SMOKE YOUR FIRST

          CIGARETTE?AFTER 60 MIN

          HOW MANY CIGARETTES A DAY DO YOU SMOKE?6-10

          ARE YOU INTERESTED IN QUITTING?THINKING ABOUT QUITTING

          PATIENT COUNSELED ON THE DANGERS OF TOBACCO USE AND URGED TO

          QUIT:2021

          COUNSELED THE PATIENT ON SMOKING CESSATION, EDUCATION

          [id number]/07/2021

          VAPORNO

          E-CIGARETTENO

          SMOKING CESSATION INFORMATION GIVEN2021

           

           

          LATEX QUESTIONNAIRE

          LATEX ALLERGY : HAVE YOU EVER DEVELOPED ANY TYPE OF REACTION

          AFTER HANDLING LATEX PRODUCTS SUCH AS RUBBER GLOVES, CONDOMS,

          DIAPHRAGMS, BALLOONS, SOCKS, OR UNDERWEAR?NO

          LATEX ALLERGY : HAVE YOU EVER DEVELOPED ANY TYPE OF REACTION

          DURING OR AFTER DENTAL APPOINTMENT, VAGINAL/RECTAL EXAMINATION,

          SURGICAL PROCEDURE, OR ANY OTHER EXPOSURE?NO

          DATE ASKED : 2021

          LATEX RISK : HAVE YOU EVER HAD ANY DIFFICULTY BREATHING OR HIVES

          AFTER EATING OR HANDLING ANY FRUITS, OR VEGETABLES; SUCH AS KIWI,

          BANANAS, STONE FRUITS, OR CHESTNUTSNO

          LATEX RISK : DO YOU HAVE A PREVIOUS PERSONAL HISTORY OF MORE THAN

          NINE SURGERIES, SPINA BIFIDA, OR REPEATED CATHERIZATIONS? NO

          LATEX RISK : ARE YOU FREQUENTLY EXPOSED TO LATEX PRODUCTS IN YOUR

          OCCUPATION?NO

           

           

          ALCOHOL USE: NO.

           

           

          ALCOHOL SCREENING

          DID YOU HAVE A DRINK CONTAINING ALCOHOL IN THE PAST YEAR?NO

          POINTS0

          INTERPRETATIONNEGATIVE

           

           

          RECREATIONAL DRUG USE

          DRUG USE?NO

           

           

          CAFFEINE

          CAFFEINE USE?YES 2 SODAS DAILY

           

           

          SEXUAL HX

          HAD SEX IN THE LAST 12 MONTHS (VAGINAL, ORAL, OR ANAL)?YES

          WITHMEN ONLY

          PREVENTION STRATEGIES DISCUSSED:OTHER

          USE PROTECTION?YES

          HOW OFTEN?SOME OF THE TIME

          LMP:3/19/18

          HAVE YOU EVER HAD AN STD?NO

           

           

          HIV / HEP-C SCREENING

          HIV TEST OFFERED TO PATIENT:YES

          DATE OFFERED:2018

          TEST ACCEPTED:YES

          BROCHURE PROVIDED TO PATIENTYES

           

           

          [place]

          [id number] NONE

           

           

          LANGUAGE

          LANGUAGES SPOKEN:ENGLISH

           

           

          EDUCATION

          LEVEL OF EDUCATION:HIGH SCHOOL

           

           

          LEARNING BARRIERS / SPECIAL NEEDS

          CHANGE FROM LAST VISIT?NO

          BARRIERS TO LEARNING?YES HAS DIFFICULTY WITH COMPREHENSION PER

          PATIENT.

          COMMENTSDOCUMENTED IN NOTES SECTION>

          HEARING IMPAIRED?NO

          VISION IMPAIRED?YES

          COGNITIVELY IMPAIRED?YES

          :CORRECTIVE LENSES

          :MENTAL RETARDATION

          READINESS TO LEARN?YES

          LEARNING PREFERENCES?YES

          LEARNING CAPABILITIES PRESENT?YES

          EMOTIONAL BARRIERS?NO

          SPECIAL DEVICES?NO

           NEEDED?NO

           

           

          DOMESTIC VIOLENCE

          STATUS:SINGLE

          DO YOU FEEL SAFE IN YOUR ENVIRONMENT?YES

           

           

          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

           

           

          MARITAL STATUS: SINGLE.

           

           

          OTHERS AT HOME: EX PARTNER, NOW ROOM MATES.

           

           

          -

          PFS REFERRAL NEEDED?NO

          CLERGY REFERRAL NEEDED?NO

          PUBLIC HEALTH REFERRAL NEEDED?NO

          WAS THE PROVIDER NOTIFIED OF ANY PERTINENT INFO?YES

          HAS THE PATIENT BEEN EDUCATED REGARDING HIS/HER PLAN OF CARE?YES

          HAS THE PATIENT BEEN EDUCATED REGARDING PAIN, THE RISK FOR PAIN,

          THE IMPORTANCE OF EFFECTIVE PAIN MANAGEMENT, AND THE PAIN

          ASSESSMENT PROCESS?YES

           

           

          ADVANCE DIRECTIVE

          ADVANCE DIRECTIVE DISCUSSED WITH PATIENT:YES DOES NOT HAVE

          ADVANCED DIRECTIVES AT THIS TIME. PATIENT GIVEN INFORMATION AND

          PATIENT WILL TAKE HOME TO REVIEW.

           

REVIEW OF SYSTEMS

           

      CONSTITUTIONAL:

           

          ANY RECENT FEVER    NO . CHILLS    NO . WEIGHT CHANGE OF UNKNOWN

          REASONS    NO .

           

      GASTROENTEROLOGY:

           

          NEW UNEXPLAINABLE CHANGES IN BOWEL CONTROL    NO . CONSTIPATION  

           NO .

           

      GENITOURINARY:

           

          ANY NEW CHANGE IN BLADDER CONTROL?    NO .

           

      NEUROLOGY:

           

          NEW ONSET DIZZINESS OR NEUROLOGICAL CHANGES NOT MENTIONED    NO .

          NEW NUMBNESS OR PAIN PATTERNS NOT MENTIONED AND PERTINENT TO

          TODAY'S VISIT    NO .

           

      CARDIOLOGY:

           

          NEW CHEST PRESSURE    NO . PATIENT DENIES    NO .

           

      RESPIRATORY:

           

          UNEXPLAINABLE COUGH    NO . NEW SHORTNESS OF BREATH    NO .

           

VITAL SIGNS

           

           LBS, HT 64 IN, BMI 41.02 INDEX, /78 MM HG, HR 93

          /MIN, RR 18 /MIN, TEMP 97.7 F, OXYGEN SAT % 98%, SAFE IN ENV?

          (Y/N) Y, NA INITIALS , REVIEWED BY: EM.

           

EXAMINATION

           

      GENERAL: THE PATIENT IS ALERT, ORIENTED TIMES THREE

          AND COOPERATIVE. LUNGS ARE CLEAR TO AUSCULTATION. HEART SHOWS

          REGULAR RHYTHM, NO MURMURS AND NO GALLOPS. THERE IS TENDERNESS IN

          THE PARASPINAL MUSCLE GROUP IN THE AREA OF THE FACET JOINTS. MRI

          OF THE LUMBAR SPINE DATED 2001 IS SHOWING SOME FACET

          ARTHROPATHY CHANGES.

           

ASSESSMENTS

           

          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

           

          LUMBAR FACET ARTHROPATHY - M47.816

           

          SPONDYLOSIS OF LUMBOSACRAL REGION, UNSPECIFIED SPINAL

          OSTEOARTHRITIS COMPLICATION STATUS - M47.817

           

          FACET ARTHROPATHY, LUMBOSACRAL - M47.817

           

TREATMENT

           

      SPONDYLOSIS WITH MYELOPATHY, LUMBAR REGION

          [facility] FACET BLOCK (PAIN)[id number]

          COMPLETION OF PROCEDURAL VISIT WHEN MEETS

          CRITERIAPATRICIA CARRANZA 2021 2:40:02 PM > CRITERIA MET

          CLINICAL NOTES: I DISCUSSED ALTERNATIVES WITH MS. RUSSELL. WE

          AGREE ON DOING A BILATERAL DIAGNOSTIC LUMBAR FACET BLOCK #2

          L4-L5, L5-S1. I AM LOOKING FOR MORE THAN 80% REDUCTION IN THE

          PAIN. THE PATIENT CURRENTLY HAS A PAIN SCORE OF 10/10 SO IT NEEDS

          TO GO DOWN TO A 1 OR A 2 IN ORDER TO MOVE FORWARD WITH

          RADIOFREQUENCY. I, ESTER KNIGHT, DOCUMENTED THE ABOVE

          INFORMATION ACTING AS A SCRIBE FOR DR. CHILDS. I HAVE REVIEWED

          THE ABOVE DOCUMENT, WRITTEN BY ESTER KNIGHT, MEDICAL

          SCRIBE, AND I VERIFY THAT IT IS ACCURATE.

           

           

      OTHERS

          NOTES: PAT DONE 2021. EM.

           

PROCEDURES

           

      PAIN NURSING RECORD

          PROCEDURE    IN ROOM 1345, PHYSICIAN IN ROOM 1401, START 1406,

          FINISH 1416, PHYSICIAN OUT OF ROOM 1417, OUT OF ROOM 1420, ECG

          NORMAL SINUS, PATIENT SHIELDED YES, SAFETY STRAP YES, PREP

          CHLOROPREP SAMIRA CARRANZA RN, DRESSING TEGADERM DR. CHILDS

          LOC:    1. ALERT, ORIENTED, PATRICIA CARRANZA 2021 2:05:53 PM

          >

          RESP:    1. REGULAR, NO DYSPNEA, PATRICIA CARRANZA 2021

          2:05:57 PM >

          COLOR:    1. PINK, PATRICIA CARRANZA 2021 2:07:17 PM >

          SKIN:    1. WARM, DRY, PATRICIA CARRANZA 2021 2:07:20 PM >

          POSITION:    1. PRONE, YONY CARRANZABETH 2021 2:07:24 PM >

          VITALS:    124/82, 87, 16, 100%, YONY CARRANZABETH 2021

          2:02:24 PM > 137/88, 89, 16, 100%, YONY CARRANZABETH 2021

          2:15:00 PM > 113/73, 73, 16, 100%, YONY CARRANZABETH 2021

          2:20:25 PM > 130/85, 95, 16, 99%, YONY CARRANZABETH 2021

          2:30:52 PM >

          NOTES    SAMIRA CARRANZA RN, TEREPATRICIA 2021 1:12:09 PM >

          COMPLETION OF PROCEDURE APPOINTMENT:    POST PAIN 2, DRESSING

          SITE DRY AND INTACT, IV N/A, GAIT STEADY, TEACHING COMPLETED,

          PATIENT ACKNOWLEDGES UNDERSTANDING YES, PROCEDURE APPOINTMENT

          COMPLETED AT 1435

      PN LUMBAR FACET BLOCK DIAGNOSTIC

          PRE PROCEDURE DIAGNOSIS    LUMBAR SPONDYLOSIS, LUMBOSACRAL

          SPONDYLOSIS

          POST PROCEDURE DIAGNOSIS    LUMBAR SPONDYLOSIS, LUMBOSACRAL

          SPONDYLOSIS

          PROCEDURE    BILATERAL L4-L5 AND BILATERAL L5-S1 FACET BLOCK

          DIAGNOSTIC NUMBER 2

          SURGEON    DR. EYAD CHILDS

          ASSISTANT    NONE

          ANESTHESIA    LOCAL

          PRE PROCEDURE NOTE    THE PATIENT WITH HISTORY OF CHRONIC LOW

          BACK PAIN. I EVALUATED THE PATIENT AND REVIEWED THE CHART. I WENT

          OVER THE RISKS, ALTERNATIVES, AND BENEFITS ASSOCIATED WITH THIS

          PROCEDURE. THE PATIENT WOULD LIKE TO PROCEED AND GAVE CONSENT TO

          PERFORM THE PROCEDURE. AS AGREED WITH THE PATIENT, WE ARE DOING

          THIS PROCEDURE TO DETERMINE IF THE PATIENT IS A CANDIDATE FOR A

          RADIOFREQUENCY ABLATION OF THE FACETS JOINTS. THE PATIENT DENIES

          UNEXPLAINABLE WEIGHT LOSS, FEVER, CHILLS, OR NEW CHANGES IN

          URINARY OR BOWEL CONTROL. THE PATIENT IS COVID-19 NEGATIVE.

          DESCRIPTION OF PROCEDURE    THE PATIENT WAS BROUGHT TO THE

          PROCEDURE ROOM AND PLACED IN THE PRONE POSITION. THE LUMBOSACRAL

          AREA WAS CLEANED WITH CHLORAPREP SOLUTION AND DRAPED ASEPTICALLY.

          THE PROCEDURE WAS DONE UNDER STERILE CONDITIONS. A TIMEOUT WAS

          PERFORMED WHERE THE CONSENTED SITE WAS VERIFIED WITH EVERYONE IN

          THE ROOM. UNDER FLUOROSCOPIC GUIDANCE, TARGETS WERE SELECTED AT

          THE INTERSECTION OF THE RIGHT AND LEFT TRANSVERSE PROCESS OF L4,

          L5 AND ALA OF S1 WITH ITS RESPECTIVE SUPERIOR ARTICULAR PROCESS

          WITH A TARGET OF THE MEDIAN BRANCHES OF L3, L4 AND THE DORSAL

          RAMI OF L5. I CONFIRMED AGAIN THE SITE OF TARGET. LIDOCAINE WAS

          USED TO NUMB THE SKIN AND THE SUBCUTANEOUS TISSUE BELOW IT.

          SPINAL NEEDLE, 22-GAUGE, WAS ADVANCED UNDER FLUOROSCOPIC GUIDANCE

          AND FOLLOWING PATIENT FEEDBACK UNTIL THE TARGETS WERE REACHED.

          POSITION OF THE NEEDLES WAS VERIFIED WITH AP AND LATERAL VIEWS.

          AFTER PROPER POSITION OF THE NEEDLES WAS ACHIEVED, ISOVUE-M DYE

          30%, 0.1 ML, WAS INJECTED AT EACH SITE SHOWING ADEQUATE SPREAD OF

          THE DYE. THEN, A SOLUTION OF 0.4 ML OF BUPIVACAINE 0.25% WAS

          INJECTED AT EACH SITE. THE MEDICATIONS WERE VERIFIED WITH THE

          NURSE. THERE WAS NO EVIDENCE OF BLOOD, PARESTHESIA OR

          CEREBROSPINAL FLUID DURING THE PROCEDURE. THE PATIENT WAS SENT TO

          THE RECOVERY ROOM. THE PATIENT WAS MOVING THE EXTREMITIES AND

          DOING WELL. THERE WERE NO COMPLICATIONS DURING THE PROCEDURE.

          ESTIMATED BLOOD LOSS WAS LESS THAN 5 ML. FLUOROSCOPY TIME WAS 46

          SECONDS

          POST PROCEDURE NOTE    THE PATIENT WILL DOCUMENT THE PAIN LEVEL

          AND RESPONSE TO THIS PROCEDURE PER PAIN DIARY. THE PATIENT WILL

          BE SEEN IN A FOLLOW UP IN THE NEXT FEW WEEKS. FURTHER

          DETERMINATION FOR THE PATIENT'S CASE WILL BE DONE AT THE NEXT

          VISIT. INSTRUCTIONS WERE GIVEN, QUESTIONS WERE ANSWERED, AND THE

          PATIENT EXPRESSED UNDERSTANDING AND AGREED WITH THE PLAN. I,

          ESTER KNIGHT, DOCUMENTED THE ABOVE INFORMATION ACTING AS A

          SCRIBE FOR DR. CHILDS. I HAVE REVIEWED THE ABOVE DOCUMENT,

          WRITTEN BY ESTER KNIGHT, MEDICAL SCRIBE, AND I VERIFY THAT

          IT IS ACCURATE

           

PROCEDURE CODES

           

          63285 INJ PARAVERT F JNT L/S 1 LEV, MODIFIERS: 50

           

          79124 INJ PARAVERT F JNT L/S 2 LEV, MODIFIERS: 50

           

DISPOSITION & COMMUNICATION

           

FOLLOW UP

           

          FOLLOW UP WITH NP (REASON: POST BILATERAL DIAGNOSTIC LUMBAR FACET

          BLOCK L4-L5, L5-S1)

           

 

ELECTRONICALLY SIGNED BY EYAD CHILDS MD, MD ON

          2021 AT 04:10 PM EDT

           

           

           

 

DISCLAIMER :

THIS IS A VISIT SUMMARY EXTRACTED FROM THE [employer] CHART.

IT IS NOT A COPY OF THE [employer] PROGRESS NOTE.

RAMÓN

## 2021-07-06 ENCOUNTER — HOSPITAL ENCOUNTER (OUTPATIENT)
Dept: HOSPITAL 53 - M PAIN | Age: 31
End: 2021-07-06
Attending: NURSE PRACTITIONER
Payer: COMMERCIAL

## 2021-07-06 DIAGNOSIS — J45.909: ICD-10-CM

## 2021-07-06 DIAGNOSIS — F32.9: ICD-10-CM

## 2021-07-06 DIAGNOSIS — F17.210: ICD-10-CM

## 2021-07-06 DIAGNOSIS — M54.5: ICD-10-CM

## 2021-07-06 DIAGNOSIS — Z79.899: ICD-10-CM

## 2021-07-06 DIAGNOSIS — G89.29: Primary | ICD-10-CM

## 2021-07-06 DIAGNOSIS — M47.817: ICD-10-CM

## 2021-07-06 DIAGNOSIS — F41.9: ICD-10-CM

## 2021-07-09 NOTE — ECWPNPC
PATIENT NAME: ISAIAH RUSSELL

: 1990

GENDER: FEMALE

MRN: [id number]

VISIT DATE: 2021

DISCHARGE DATE: 21 1345

VISIT LOCKED DATE TIME: 

PHYSICIAN: CHARLEY GUTIÉRREZ 

PHYSICIAN PAGER NO: INACTIVE

RESOURCE: CHARLEY GUTIÉRREZ 

 

           

           

REASON FOR APPOINTMENT

           

          1. POST RIGHT DIAGNOSTIC LUMBAR FACET BLOCK #2 L4-L5, L5-S1

           

HISTORY OF PRESENT ILLNESS

           

      GENERAL:

      HPI

          31-YEAR-OLD FEMALE IN FOR POST RIGHT DIAGNOSTIC LUMBAR FACET

          BLOCK #2 L4-L5 L5-S1. PATIENT FEELS THE PROCEDURE WAS SUCCESSFUL

          OVERALL RATING HER PAIN PREPROCEDURE AT A 10 OUT OF 10 AND

          POSTPROCEDURE AT A 1-2 OUT OF 10. SHE FURTHER STATES THE

          PROCEDURE CONTINUES TO HELP HER TODAY RATING HER PAIN CURRENTLY

          AT A 1 OUT OF 10..

           

           -.

           

      FALL RISK SCREENING:

      SCREENING

          : NO FALLS REPORTED IN THE LAST YEAR.

           

      PAIN SCREENING:

      PATIENT HAS A COMPLAINT OF ACUTE OR CHRONIC PAIN

           

           

          :YES

          LOCATION OF PAIN:LOW BACK

          INTENSITY OF PAIN (SCALE OF 1 TO 10):1

          WHAT DOES YOUR PAIN FEEL LIKE:ACHING, BURNING, CONTINOUS

          DURATION:CONTINOUS, CONSTANT, ALL DAY, ONLY WITH SPECIFIC

          ACTIVITIES

          PAIN IS INCREASED BY:ACTIVITIES

          PAIN IS DECREASED BY:SITTING RESTING

           

      NURSING NOTE:

           -.

           

      PAIN CENTER INTAKE QUESTIONS:

      DO YOU HAVE A HISTORY OF MRSA?

           

           

          :NO

           

      DO YOU TAKE A BLOOD THINNERS?

           

           

          :NO

           

      DO YOU HAVE ANY BLEEDING DISORDERS?

           

           

          :NO

           

      ANY NEW NUMBNESS OR WEAKNESS IN YOUR LEGS OR ARMS?

           

           

          :NO

           

      ANY PACEMAKER,DEFIBRILLATOR, OR DORSAL COLUMN

          STIMULATOR?

           

           

          :NO

           

      DO YOU HAVE ANY RASHES OR OPEN SORES?

           

           

          :NO

           

      ARE YOU ALLERGIC TO IV DYE?

           

           

          :NO

           

      ARE YOU DIABETIC?

           

           

          :NO

           

      ANY NEW PROBLEMS WITH YOUR MEDICATIONS?

           

           

          :NO

           

      HAVE YOU RECEIVED A VACCINE IN THE PAST 30 DAYS?

           

           

          :NO

           

      DO YOU PLAN TO RECEIVE A VACCINE IN THE NEXT 21

          DAYS?

           

           

          :NO

           

      DO YOU NEED ANY PRESCRIPTION?

           

           

          :NO

           

      DO YOU TAKE ANY IMMUNOSUPPRESSIVE MEDICATIONS?

           

           

          :NO

           

      DO YOU HAVE ANY KIDNEY OR LIVER DISEASE?

           

           

          :NO

           

      IS THERE A CHANCE YOU COULD BE PREGNANT?

           

           

          :NO

           

      ARE YOU BREAST FEEDING?

           

           

          :NO

           

CURRENT MEDICATIONS

           

          TAKING CELEBREX 100 MG CAPSULE 1 CAPSULE WITH FOOD ORALLY ONCE A

          DAY

          TAKING SEROQUEL 100 MG TABLET 1 TABLET AT BEDTIME ORALLY ONCE A

          DAY

          TAKING REMERON 15 MG TABLET 1 TABLET ORALLY ONCE A DAY

          TAKING LISINOPRIL 10 MG TABLET 1 TABLET ORALLY ONCE A DAY

          TAKING BACLOFEN 20 MG TABLET 1 TABLET WITH FOOD OR MILK ORALLY

          THREE TIMES A DAY

          TAKING FEXOFENADINE  MG TABLET 1 TABLET ORALLY ONCE A DAY

          NOT-TAKING CLARITIN 10 MG TABLET 1 TABLET ORALLY ONCE A DAY

          NOT-TAKING CHANTIX 1 TAB ORAL , NOTES: NOT SURE OF DOSE

          NOT-TAKING TRAZODONE  MG TABLET 1 TABLET AT BEDTIME AS

          NEEDED ORALLY ONCE A DAY

          NOT-TAKING IBUPROFEN 800 MG TABLET 1 TABLET WITH FOOD OR MILK AS

          NEEDED ORALLY THREE TIMES A DAY

          NOT-TAKING ESCITALOPRAM OXALATE 20 MG TABLET 0.5 TABLET ORALLY

          ONCE A DAY

          NOT-TAKING HYDROXYZINE HCL 50 MG TABLET 1 TABLET AS NEEDED ORALLY

          TID AS NEEDED

          NOT-TAKING IBUPROFEN 200 MG TABLET 1 TABLET WITH FOOD OR MILK AS

          NEEDED ORALLY THREE TIMES A DAY

          MEDICATION LIST REVIEWED AND RECONCILED WITH THE PATIENT

           

PAST MEDICAL HISTORY

           

          DEPRESSION/ANXIETY

          INSOMNIA

          ASTHMA

          LOW BACK PAIN

           

ALLERGIES

           

          SEASONALE: RUNNY NOSE - ALLERGY

           

SOCIAL HISTORY

           

          GENERAL:

           

          TOBACCO USE

          ARE YOU A:CURRENT SMOKER

          ARE YOU INTERESTED IN QUITTING?THINKING ABOUT QUITTING

          COUNSELED THE PATIENT ON SMOKING CESSATION, EDUCATION

          [id number]/06/2021

          HOW MANY CIGARETTES A DAY DO YOU SMOKE?6-10

          HOW SOON AFTER YOU WAKE UP DO YOU SMOKE YOUR FIRST

          CIGARETTE?AFTER 60 MIN

          HOW OFTEN DO YOU SMOKE CIGARETTES?EVERY DAY

          PATIENT COUNSELED ON THE DANGERS OF TOBACCO USE AND URGED TO

          QUIT:2021

          SMOKING CESSATION INFORMATION GIVEN2021

          VAPORNO

          E-CIGARETTENO

           

           

          LATEX QUESTIONNAIRE

          LATEX ALLERGY : HAVE YOU EVER DEVELOPED ANY TYPE OF REACTION

          AFTER HANDLING LATEX PRODUCTS SUCH AS RUBBER GLOVES, CONDOMS,

          DIAPHRAGMS, BALLOONS, SOCKS, OR UNDERWEAR?NO

          LATEX ALLERGY : HAVE YOU EVER DEVELOPED ANY TYPE OF REACTION

          DURING OR AFTER DENTAL APPOINTMENT, VAGINAL/RECTAL EXAMINATION,

          SURGICAL PROCEDURE, OR ANY OTHER EXPOSURE?NO

          LATEX RISK : HAVE YOU EVER HAD ANY DIFFICULTY BREATHING OR HIVES

          AFTER EATING OR HANDLING ANY FRUITS, OR VEGETABLES; SUCH AS KIWI,

          BANANAS, STONE FRUITS, OR CHESTNUTSNO

          LATEX RISK : DO YOU HAVE A PREVIOUS PERSONAL HISTORY OF MORE THAN

          NINE SURGERIES, SPINA BIFIDA, OR REPEATED CATHERIZATIONS? NO

          LATEX RISK : ARE YOU FREQUENTLY EXPOSED TO LATEX PRODUCTS IN YOUR

          OCCUPATION?NO

          DATE ASKED : 2021

           

           

          ALCOHOL USE: NO.

           

           

          ALCOHOL SCREENING

          DID YOU HAVE A DRINK CONTAINING ALCOHOL IN THE PAST YEAR?NO

          POINTS0

          INTERPRETATIONNEGATIVE

           

           

          RECREATIONAL DRUG USE

          DRUG USE?NO

           

           

          CAFFEINE

          CAFFEINE USE?YES 2 SODAS DAILY

           

           

          SEXUAL HX

          HAD SEX IN THE LAST 12 MONTHS (VAGINAL, ORAL, OR ANAL)?YES

          WITHMEN ONLY

          PREVENTION STRATEGIES DISCUSSED:OTHER

          USE PROTECTION?YES

          HOW OFTEN?SOME OF THE TIME

          LMP:3/19/18

          HAVE YOU EVER HAD AN STD?NO

           

           

          HIV / HEP-C SCREENING

          HIV TEST OFFERED TO PATIENT:YES

          DATE OFFERED:2018

          TEST ACCEPTED:YES

          BROCHURE PROVIDED TO PATIENTYES

           

           

          [employer]

          [id number] NONE

           

           

          LANGUAGE

          LANGUAGES SPOKEN:ENGLISH

           

           

          EDUCATION

          LEVEL OF EDUCATION:HIGH SCHOOL

           

           

          LEARNING BARRIERS / SPECIAL NEEDS

          CHANGE FROM LAST VISIT?NO

          BARRIERS TO LEARNING?YES HAS DIFFICULTY WITH COMPREHENSION PER

          PATIENT.

          COMMENTSDOCUMENTED IN NOTES SECTION>

          HEARING IMPAIRED?NO

          VISION IMPAIRED?YES

          :CORRECTIVE LENSES

          COGNITIVELY IMPAIRED?YES

          :MENTAL RETARDATION

          READINESS TO LEARN?YES

          LEARNING PREFERENCES?YES

          LEARNING CAPABILITIES PRESENT?YES

          EMOTIONAL BARRIERS?NO

          SPECIAL DEVICES?NO

           NEEDED?NO

           

           

          DOMESTIC VIOLENCE

          STATUS:SINGLE

          DO YOU FEEL SAFE IN YOUR ENVIRONMENT?YES

           

           

          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

           

           

          MARITAL STATUS: SINGLE.

           

           

          OTHERS AT HOME: EX PARTNER, NOW ROOM MATES.

           

           

          -

          PFS REFERRAL NEEDED?NO

          CLERGY REFERRAL NEEDED?NO

          PUBLIC HEALTH REFERRAL NEEDED?NO

          WAS THE PROVIDER NOTIFIED OF ANY PERTINENT INFO?YES

          HAS THE PATIENT BEEN EDUCATED REGARDING HIS/HER PLAN OF CARE?YES

          HAS THE PATIENT BEEN EDUCATED REGARDING PAIN, THE RISK FOR PAIN,

          THE IMPORTANCE OF EFFECTIVE PAIN MANAGEMENT, AND THE PAIN

          ASSESSMENT PROCESS?YES

           

           

          ADVANCE DIRECTIVE

          ADVANCE DIRECTIVE DISCUSSED WITH PATIENT:YES DOES NOT HAVE

          ADVANCED DIRECTIVES AT THIS TIME. PATIENT GIVEN INFORMATION AND

          PATIENT WILL TAKE HOME TO REVIEW.

           

REVIEW OF SYSTEMS

           

      CONSTITUTIONAL:

           

          ANY RECENT FEVER    NO . CHILLS    NO . WEIGHT CHANGE OF UNKNOWN

          REASONS    NO .

           

      GASTROENTEROLOGY:

           

          NEW UNEXPLAINABLE CHANGES IN BOWEL CONTROL    NO . CONSTIPATION  

           NO .

           

      GENITOURINARY:

           

          ANY NEW CHANGE IN BLADDER CONTROL?    NO .

           

      NEUROLOGY:

           

          NEW ONSET DIZZINESS OR NEUROLOGICAL CHANGES NOT MENTIONED    NO .

          NEW NUMBNESS OR PAIN PATTERNS NOT MENTIONED AND PERTINENT TO

          TODAY'S VISIT    NO .

           

      CARDIOLOGY:

           

          NEW CHEST PRESSURE    NO . PATIENT DENIES    NO .

           

      RESPIRATORY:

           

          UNEXPLAINABLE COUGH    NO . NEW SHORTNESS OF BREATH    NO .

           

VITAL SIGNS

           

           LBS, HT 64 IN, BMI 40.50 INDEX, /90 MM HG, 

          /MIN, RR 18 /MIN, TEMP 97.8 F, OXYGEN SAT % 100%, SAFE IN ENV?

          (Y/N) YEST.REED SANTOS.

           

EXAMINATION

           

      GENERAL EXAMINATION:

          GENERALNO ACUTE DISTRESS, WELL NOURISHED AND HYDRATED.

           

          PSYCHAPPROPRIATE MOOD AND AFFECT .

           

          LUNGS:CLEAR TO AUSCULTATION BILATERALLY, NO WHEEZES, RHONCHI,

          RALES.

           

          HEART:NO MURMURS, REGULAR RATE AND RHYTHM.

           

          BACK:PATIENT DOES ENDORSE INCREASED PAIN WITH FACET LOADING.

           

ASSESSMENTS

           

          OTHER CHRONIC PAIN - G89.29 (PRIMARY)

           

          SPONDYLOSIS WITHOUT MYELOPATHY OR RADICULOPATHY, LUMBOSACRAL

          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

           

TREATMENT

           

      OTHER CHRONIC PAIN

          PAIN PROCEDURE LOGDATE OF PROCEDURE1PROCEDURE:BILATERAL

          DIAGNOSTIC LUMBAR FACET BLOCK #2 L4-L5,L5-L9JHZIED OF PRE

          SEDATE0/0RESULT:PREPROCEDURE 10 OUT OF 10 POST PROCEDURE 1-2 OUT

          OF 10. CONTINUES TO HELP HER TODAY.

           

           

      SPONDYLOSIS WITHOUT MYELOPATHY OR RADICULOPATHY,

          LUMBOSACRAL REGION

          MED: PAIN NORCO TABLET 5MG/325MG ORALLY HYDROCODONE/ACETAMINOPHEN

          (ORDERED FOR 07/15/2021)

          MEDICATION: PAIN VALIUM TAB 5MG ORALLY (DIAZEPAM) (ORDERED FOR

          07/15/2021)

          NOTES: 31-YEAR-OLD FEMALE IN FOR POST DIAGNOSTIC FACET BLOCK

          FOLLOW-UP. GIVEN PRESENTING SYMPTOMS RECOMMEND RIGHT LUMBAR COOL

          RADIOFREQUENCY L4-L5 L5-S1 WITH POSTPROCEDURAL FOLLOW-UP. PATIENT

          HAS EXPRESSED UNDERSTANDING OF AND WAS IN AGREEMENT WITH

          TREATMENT PLAN. GIVEN TIME TO ASK QUESTIONS AND EXPRESS CONCERNS.

           

PROCEDURE CODES

           

           ESTABILISHED PATIENT [facility] FACILITY CHARGE

           

DISPOSITION & COMMUNICATION

           

FOLLOW UP

           

          POST PROCEDURE (REASON: RIGHT COOL RADIO FREQUENCY OF THE LUMBAR

          SPINE L4-L5,L5-S1)

           

 

ELECTRONICALLY SIGNED BY JUVENTINO BAUER ON

          2021 AT 08:27 AM EDT

           

           

           

 

DISCLAIMER :

THIS IS A VISIT SUMMARY EXTRACTED FROM THE [facility] CHART.

IT IS NOT A COPY OF THE [facility] PROGRESS NOTE.

RAMÓN

## 2021-08-05 ENCOUNTER — HOSPITAL ENCOUNTER (OUTPATIENT)
Dept: HOSPITAL 53 - M LABSMTC | Age: 31
End: 2021-08-05
Attending: ANESTHESIOLOGY
Payer: COMMERCIAL

## 2021-08-05 DIAGNOSIS — Z20.822: ICD-10-CM

## 2021-08-05 DIAGNOSIS — Z01.818: Primary | ICD-10-CM

## 2021-08-10 ENCOUNTER — HOSPITAL ENCOUNTER (OUTPATIENT)
Dept: HOSPITAL 53 - M PAIN | Age: 31
End: 2021-08-10
Attending: ANESTHESIOLOGY
Payer: COMMERCIAL

## 2021-08-10 DIAGNOSIS — J45.909: ICD-10-CM

## 2021-08-10 DIAGNOSIS — Z86.59: ICD-10-CM

## 2021-08-10 DIAGNOSIS — E66.01: ICD-10-CM

## 2021-08-10 DIAGNOSIS — F17.210: ICD-10-CM

## 2021-08-10 DIAGNOSIS — G47.30: ICD-10-CM

## 2021-08-10 DIAGNOSIS — M47.816: ICD-10-CM

## 2021-08-10 DIAGNOSIS — Z79.899: ICD-10-CM

## 2021-08-10 DIAGNOSIS — M47.817: Primary | ICD-10-CM

## 2021-08-10 PROCEDURE — 64636 DESTROY L/S FACET JNT ADDL: CPT

## 2021-08-10 PROCEDURE — 64635 DESTROY LUMB/SAC FACET JNT: CPT

## 2021-08-10 NOTE — REP
INDICATION:

RIGHT LUMBAR COOL RADIOFREQUENCY.



COMPARISON:

None.



TECHNIQUE:

Five views.  141.3 seconds of fluoroscopy time is reported.



FINDINGS:

A sequence of 5 last image hold fluoroscopically obtained spot radiograph(s) of the

lumbar spine document(s) needle position(s) and contrast injection associated with

injection procedure.



IMPRESSION:

Procedural imaging.





<Electronically signed by Carlos Jones > 08/10/21 [id number]

## 2021-08-14 NOTE — ECWPNPC
PATIENT NAME: ISAIAH RUSSELL

: 1990

GENDER: FEMALE

MRN: [id number]

VISIT DATE: 08/10/2021

DISCHARGE DATE: 08/10/21 1750

VISIT LOCKED DATE TIME: 

PHYSICIAN: EYAD CHILDS MD

PHYSICIAN PAGER NO: INACTIVE

RESOURCE: EYAD CHILDS MD

 

           

           

REASON FOR APPOINTMENT

           

          1. RIGHT LUMBAR COOL RADIOFREQUENCY L4-L5,L5-S1

           

HISTORY OF PRESENT ILLNESS

           

      GENERAL:

           -.

           

      FALL RISK SCREENING:

      SCREENING

          : NO FALLS REPORTED IN THE LAST YEAR.

           

      PAIN SCREENING:

      PATIENT HAS A COMPLAINT OF ACUTE OR CHRONIC PAIN

           

           

          :YES

          LOCATION OF PAIN:MID BACK, LOW BACK

          INTENSITY OF PAIN (SCALE OF 1 TO 10):9

          WHAT DOES YOUR PAIN FEEL LIKE:ACHING, CONTINOUS, STABBING

          DURATION:CONTINOUS

          PAIN IS INCREASED BY:ACTIVITIES

          PAIN IS DECREASED BY:OTHERS NOTHING HELPS PAIN AT THIS TIME

           

      NURSING NOTE:

           -.

           

      PAIN CENTER INTAKE QUESTIONS:

      DO YOU HAVE A HISTORY OF MRSA?

           

           

          :NO

           

      DO YOU TAKE A BLOOD THINNERS?

           

           

          :NO

           

      DO YOU HAVE ANY BLEEDING DISORDERS?

           

           

          :NO

           

      ANY NEW NUMBNESS OR WEAKNESS IN YOUR LEGS OR ARMS?

           

           

          :NO

           

      ANY PACEMAKER,DEFIBRILLATOR, OR DORSAL COLUMN

          STIMULATOR?

           

           

          :NO

           

      DO YOU HAVE ANY RASHES OR OPEN SORES?

           

           

          :NO

           

      ARE YOU ALLERGIC TO IV DYE?

           

           

          :NO

           

      ARE YOU DIABETIC?

           

           

          :NO

           

      ANY NEW PROBLEMS WITH YOUR MEDICATIONS?

           

           

          :NO

           

      HAVE YOU RECEIVED A VACCINE IN THE PAST 30 DAYS?

           

           

          :NO

           

      DO YOU PLAN TO RECEIVE A VACCINE IN THE NEXT 21

          DAYS?

           

           

          :NO

           

      DO YOU TAKE ANY IMMUNOSUPPRESSIVE MEDICATIONS?

           

           

          :NO

           

      ANY HISTORY OF SEIZURES?

           

           

          :NO

           

      ANY HISTORY OF CARDIAC ISSUES OR EVENTS?

           

           

          :NO

           

      DO YOU HAVE ANY KIDNEY OR LIVER DISEASE?

           

           

          :NO

           

      DO YOU HAVE SLEEP APNEA?

           

           

          :YES

          DO YOU WEAR A CPAP?YES

           

      ANY RECENT HEAD INJURY?

           

           

          :NO

           

      DO YOU HAVE ANY NEW INFECTIONS?

           

           

          :NO

           

      IS THERE A CHANCE YOU COULD BE PREGNANT?

           

           

          :NO

           

      ARE YOU BREAST FEEDING?

           

           

          :NO

           

      WHEN DID YOU LAST EAT?

           

           

          : 2021 2300

           

      WHEN DID YOU LAST DRINK?

           

           

          : 08/10/2021 0930

           

      WHAT DID YOU LAST DRINK?

           

           

          : WATER

           

      NAME OF PERSON DRIVING YOU HOME?

           

           

          : [employer]

           

      DO YOU HAVE ANY OTHER QUESTIONS OR CONCERNS?

           

           

          : NO

           

CURRENT MEDICATIONS

           

          TAKING CELEBREX 100 MG CAPSULE 1 CAPSULE WITH FOOD ORALLY ONCE A

          DAY

          TAKING SEROQUEL 100 MG TABLET 1 TABLET AT BEDTIME ORALLY ONCE A

          DAY

          TAKING REMERON 15 MG TABLET 1 TABLET ORALLY ONCE A DAY

          TAKING LISINOPRIL 10 MG TABLET 1 TABLET ORALLY ONCE A DAY

          TAKING BACLOFEN 20 MG TABLET 1 TABLET WITH FOOD OR MILK ORALLY

          THREE TIMES A DAY

          TAKING FEXOFENADINE  MG TABLET 1 TABLET ORALLY ONCE A DAY

          NOT-TAKING CLARITIN 10 MG TABLET 1 TABLET ORALLY ONCE A DAY

          NOT-TAKING CHANTIX 1 TAB ORAL , NOTES: NOT SURE OF DOSE

          NOT-TAKING TRAZODONE  MG TABLET 1 TABLET AT BEDTIME AS

          NEEDED ORALLY ONCE A DAY

          NOT-TAKING IBUPROFEN 800 MG TABLET 1 TABLET WITH FOOD OR MILK AS

          NEEDED ORALLY THREE TIMES A DAY

          NOT-TAKING ESCITALOPRAM OXALATE 20 MG TABLET 0.5 TABLET ORALLY

          ONCE A DAY

          NOT-TAKING HYDROXYZINE HCL 50 MG TABLET 1 TABLET AS NEEDED ORALLY

          TID AS NEEDED

          NOT-TAKING IBUPROFEN 200 MG TABLET 1 TABLET WITH FOOD OR MILK AS

          NEEDED ORALLY THREE TIMES A DAY

          MEDICATION LIST REVIEWED AND RECONCILED WITH THE PATIENT

           

PAST MEDICAL HISTORY

           

          DEPRESSION/ANXIETY

          INSOMNIA

          ASTHMA

          LOW BACK PAIN

           

ALLERGIES

           

          SEASONALE: RUNNY NOSE - ALLERGY

           

SOCIAL HISTORY

           

          GENERAL:

           

          TOBACCO USE

          ARE YOU A:CURRENT SMOKER

          ARE YOU INTERESTED IN QUITTING?THINKING ABOUT QUITTING

          COUNSELED THE PATIENT ON SMOKING CESSATION, EDUCATION

          [id number]/06/2021

          HOW MANY CIGARETTES A DAY DO YOU SMOKE?6-10

          HOW SOON AFTER YOU WAKE UP DO YOU SMOKE YOUR FIRST

          CIGARETTE?AFTER 60 MIN

          HOW OFTEN DO YOU SMOKE CIGARETTES?EVERY DAY

          PATIENT COUNSELED ON THE DANGERS OF TOBACCO USE AND URGED TO

          QUIT:08/10/2021

          SMOKING CESSATION INFORMATION [id number]

          VAPORNO

          E-CIGARETTENO

           

           

          LATEX QUESTIONNAIRE

          LATEX ALLERGY : HAVE YOU EVER DEVELOPED ANY TYPE OF REACTION

          AFTER HANDLING LATEX PRODUCTS SUCH AS RUBBER GLOVES, CONDOMS,

          DIAPHRAGMS, BALLOONS, SOCKS, OR UNDERWEAR?NO

          LATEX ALLERGY : HAVE YOU EVER DEVELOPED ANY TYPE OF REACTION

          DURING OR AFTER DENTAL APPOINTMENT, VAGINAL/RECTAL EXAMINATION,

          SURGICAL PROCEDURE, OR ANY OTHER EXPOSURE?NO

          LATEX RISK : HAVE YOU EVER HAD ANY DIFFICULTY BREATHING OR HIVES

          AFTER EATING OR HANDLING ANY FRUITS, OR VEGETABLES; SUCH AS KIWI,

          BANANAS, STONE FRUITS, OR CHESTNUTSNO

          LATEX RISK : DO YOU HAVE A PREVIOUS PERSONAL HISTORY OF MORE THAN

          NINE SURGERIES, SPINA BIFIDA, OR REPEATED CATHERIZATIONS? NO

          LATEX RISK : ARE YOU FREQUENTLY EXPOSED TO LATEX PRODUCTS IN YOUR

          OCCUPATION?NO

          DATE ASKED : 08/10/2021

           

           

          ALCOHOL USE: NO.

           

           

          ALCOHOL SCREENING

          DID YOU HAVE A DRINK CONTAINING ALCOHOL IN THE PAST YEAR?NO

          POINTS0

          INTERPRETATIONNEGATIVE

           

           

          RECREATIONAL DRUG USE

          DRUG USE?NO

           

           

          CAFFEINE

          CAFFEINE USE?YES 2 SODAS DAILY

           

           

          SEXUAL HX

          HAD SEX IN THE LAST 12 MONTHS (VAGINAL, ORAL, OR ANAL)?YES

          WITHMEN ONLY

          USE PROTECTION?YES

          HOW OFTEN?SOME OF THE TIME

          PREVENTION STRATEGIES DISCUSSED:OTHER

          HAVE YOU EVER HAD AN STD?NO

          LMP:3/19/18

           

           

          HIV / HEP-C SCREENING

          HIV TEST OFFERED TO PATIENT:YES

          DATE OFFERED:2018

          TEST ACCEPTED:YES

          BROCHURE PROVIDED TO PATIENTYES

           

           

          [employer]

          [id number] NONE

           

           

          LANGUAGE

          LANGUAGES SPOKEN:ENGLISH

           

           

          EDUCATION

          LEVEL OF EDUCATION:HIGH SCHOOL

           

           

          LEARNING BARRIERS / SPECIAL NEEDS

          CHANGE FROM LAST VISIT?NO

          BARRIERS TO LEARNING?YES HAS DIFFICULTY WITH COMPREHENSION PER

          PATIENT.

          COMMENTSDOCUMENTED IN NOTES SECTION>

          HEARING IMPAIRED?NO

          VISION IMPAIRED?YES

          :CORRECTIVE LENSES

          COGNITIVELY IMPAIRED?YES

          :MENTAL RETARDATION

          READINESS TO LEARN?YES

          LEARNING PREFERENCES?YES

          LEARNING CAPABILITIES PRESENT?YES

          EMOTIONAL BARRIERS?NO

          SPECIAL DEVICES?NO

           NEEDED?NO

           

           

          DOMESTIC VIOLENCE

          STATUS:SINGLE

          DO YOU FEEL SAFE IN YOUR ENVIRONMENT?YES

           

           

          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

           

           

          MARITAL STATUS: SINGLE.

           

           

          OTHERS AT HOME: EX PARTNER, NOW ROOM MATES.

           

           

          -

          PFS REFERRAL NEEDED?NO

          CLERGY REFERRAL NEEDED?NO

          PUBLIC HEALTH REFERRAL NEEDED?NO

          WAS THE PROVIDER NOTIFIED OF ANY PERTINENT INFO?YES

          HAS THE PATIENT BEEN EDUCATED REGARDING HIS/HER PLAN OF CARE?YES

          HAS THE PATIENT BEEN EDUCATED REGARDING PAIN, THE RISK FOR PAIN,

          THE IMPORTANCE OF EFFECTIVE PAIN MANAGEMENT, AND THE PAIN

          ASSESSMENT PROCESS?YES

           

           

          ADVANCE DIRECTIVE

          ADVANCE DIRECTIVE DISCUSSED WITH PATIENT:YES DOES NOT HAVE

          ADVANCED DIRECTIVES AT THIS TIME. PATIENT GIVEN INFORMATION AND

          PATIENT WILL TAKE HOME TO REVIEW.

           

VITAL SIGNS

           

          .8 LBS, WT-.5 KG, HT 64 IN, BMI 40.30 INDEX, BP

          122/79 MM HG, HR 95 /MIN, RR 18 /MIN, TEMP 98.6 F, OXYGEN SAT %

          97%, SAFE IN ENV? (Y/N) YES, NA INITIALS AW 1259, REVIEWED BY: LESLEY CARRENO RN.

           

EXAMINATION

           

      GENERAL: THE PATIENT IS ALERT, ORIENTED TIMES THREE

          AND COOPERATIVE. LUNGS ARE CLEAR TO AUSCULTATION. HEART SHOWS

          REGULAR RHYTHM, NO MURMURS AND NO GALLOPS.

           

ASSESSMENTS

           

          SPONDYLOSIS WITHOUT MYELOPATHY OR RADICULOPATHY, LUMBOSACRAL

          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

           

          SPONDYLOSIS WITHOUT MYELOPATHY OR RADICULOPATHY, LUMBAR REGION -

          M47.816

           

TREATMENT

           

      SPONDYLOSIS WITHOUT MYELOPATHY OR RADICULOPATHY,

          LUMBOSACRAL REGION

          START CARISOPRODOL TABLET, 350 MG, 1 TABLET AS NEEDED, ORALLY FOR

          SPASMS AND PAIN, EVERY 8 HOURS AS NEEDED MDD3, 3 DAYS, 8, REFILLS

          0

          [facility] FACET BLOCK (PAIN)0678096

          COMPLETION OF PROCEDURAL VISIT WHEN MEETS [id number]

          MED: PAIN NORCO TABLET 5MG/325MG ORALLY

          HYDROCODONE/[id number],ELIZABETH 8/10/2021

          1:40:35 PM > VERIFIED HOLLY SERNA 8/10/2021 1:44:05 PM >

          ADMINISTERED

          MEDICATION: PAIN VALIUM TAB 5MG ORALLY

          (DIAZEPAM)[id number] CARRANZA 8/10/2021 1:40:49 PM > VERIFIED

          HOLLY SERNA 8/10/2021 1:44:22 PM > ADMINISTERED

          CLINICAL NOTES: FOR POST-PROCEDURE PAIN- ISTOP [id number] CHECKED.

           

PROCEDURES

           

      PAIN NURSING RECORD

          PROCEDURE    IN ROOM 1520, PHYSICIAN IN ROOM 1538, START 1541,

          FINISH 1623, PHYSICIAN OUT OF ROOM 1624, OUT OF ROOM 1635, ECG

          NORMAL SINUS, PATIENT SHIELDED YES, SAFETY STRAP YES, PREP

          CHLOROPREP, DRESSING TEGADERM DR. CHILDS

          LOC:    1. ALERT, ORIENTED, PATRICIA CARRANZA 8/10/2021 3:43:52 PM

          >

          RESP:    1. REGULAR, NO DYSPNEA, PATRICIA CARRANZA 8/10/2021

          3:43:55 PM >

          COLOR:    1. PINK, PATRICIA CARRANZA 8/10/2021 3:43:59 PM >

          SKIN:    1. WARM, DRY, PATRICIA CARRANZA 8/10/2021 3:44:02 PM >

          POSITION:    1. PRONE, PATRICIA CARRANZA 8/10/2021 3:32:40 PM >

          VITALS:    1400 P91 0299% /67 AW 1215 P88 0299% /69 AW

          14 PATRICIA CARRANZA 8/10/2021 5:01:22 PM > , 28 P88 0299 BP

          115/57 AW KIKA MCKEON 8/10/2021 2:45:45 PM > HR 86 02 100% BP

          112/61 1500 P80 0299% R18 /69 AW 1518 P83 02 100% R18

          /64 121/78, 79, 16, 98%, PATRICIA CARRANZA 8/10/2021 3:33:02

          PM > 126/81, 80, 16, 99%, PATRICIA CARRANZA 8/10/2021 3:44:22 PM >

          134/67, 67, 16, 100%, PATRICIA CARRANZA 8/10/2021 4:01:26 PM >

          132/74, 80, 16, 99%, PATRICIA CARRANZA 8/10/2021 4:15:04 PM >

          131/78, 84, 16, 98%, PATRICIA CARRANZA 8/10/2021 4:30:54 PM >

          130/81, 91, 16, 97%, PATRICIA CARRANZA 8/10/2021 4:45:29 PM >

          CHARLI CARRANZA RN, PATRICIA CARRANZA 8/10/2021 3:33:13 PM >

          COMPLETION OF PROCEDURE APPOINTMENT:    POST PAIN 5, DRESSING

          SITE DRY AND INTACT, IV N/A, GAIT STEADY, TEACHING COMPLETED,

          PATIENT ACKNOWLEDGES UNDERSTANDING YES, PROCEDURE APPOINTMENT

          COMPLETED AT 1700

          :    POST PROCEDURE, PT WAS TEARFUL, PT CONSOLED. IN RECOVERY

          BAY, REVIEWED POST PROCEDURE INSTRUCTIONS, DISCUSSED PAIN MEDS.

          PT STATED SHE TAKES BACLOFEN AND IT'S NOT WORKING, THEN STARTED

          CRYING AGAIN. DISCUSSED WITH DR CHILDS, ISTOP GIVEN TO HIM, HE

          STATED HE WOULD PRESCRIBE SOMA Q 8 HRS X 3 DAYS MDD 2. INFORMED

          PT OF THE ABOVE, PT EXPRESSED UNDERSTANDING.TERE ELIZABETH

          8/10/2021 5:06:40 PM >

      PN RADIOFREQUENCY

          DATE OF PROCEDURE    8/10/2021

          THERMO LESION RADIOFREQUENCY > 80 DEGREES    : COOL - AVANOS

          SYSTEM SET AT 60* WITH TISSUE TARGET TEMP > 80* OR MORE. STRAIGHT

          NEEDLE

          SIDE:    : RIGHT

          LEVELS:    : L4-L5, L5-S1

          NEEDLE/CATHETER/GAUGE:    : 17

          CANULA LENGTH:    : 150 MM

          ACTIVE TIP:    : 4 MM

          GROUNDING PAD PLACED ON AFFECTED SIDE (MUSCULAR AREA):    :

          LUMBAR (POSTERIOR UPPER THIGH)

          1 ST LEVEL:    : L3, INITAL POSTIVE SENSORY RESPONE (50 HZ) 0.2,

          MOTOR RESPONSE (2 HZ-UP TO 3 VOLTS) 3.0 , PRE-LOCAL IMPEDENCE

          READING OHMS 291 , POST-LOCAL IMPEDENCE READING OHMS 240 , DURING

          RF IMPEDENCE READING OHMS 274

          2 ND LEVEL:    : L4, INITIAL POSITIVE SENSORY RESPONSE (50 HZ)

          0.8, MOTOR RESPONSE (2 HZ- UP TO 3 VOLTS) 3.0 , PRE-LOCAL

          IMPEDENCE READING OHMS 190 , POST-LOCAL IMEPEDENCE READING OHMS

          220 , DURING RF IMPEDENCE READING OHMS 229

          3 RD LEVEL:    : L5, INITIAL POSITIVE SENSORY RESPONSE (50 HZ)

          1.5, MOTOR RESPONSE (2HZ- UP TO 3 VOLTS) 3.0 , PRE- LOCAL

          IMPEDENCE READING OHMS 200 , POST-LOCAL IMPEDENCE READING OHMS

          180 , DURING RF IMPEDENCE READING OHMS 156

          PRE PROCEDURE DIAGNOSES    1. LUMBAR SPONDYLOSIS. 2. LUMBOSACRAL

          SPONDYLOSIS

          POST PROCEDURE DIAGNOSES    1. LUMBAR SPONDYLOSIS. 2. LUMBOSACRAL

          SPONDYLOSIS

          PROCEDURE    RIGHT L4-L5 AND RIGHT L5-S1 LUMBAR FACET

          RADIOFREQUENCY AVANOS COOL RADIOFREQUENCY

          SURGEON    DR. EYAD CHILDS

          ASSISTANT    NONE

          ANESTHESIA    LOCAL

          PRE PROCEDURE REPORT    THE PATIENT HAS HISTORY OF CHRONIC LOW

          BACK PAIN. I EVALUATED THE PATIENT AND REVIEWED THE CHART. I WENT

          OVER THE RISKS, ALTERNATIVES, AND BENEFITS ASSOCIATED WITH THIS

          PROCEDURE. THE PATIENT WOULD LIKE TO PROCEED AND GAVE CONSENT TO

          PERFORM THE PROCEDURE. THE PATIENT DENIES UNEXPLAINABLE WEIGHT

          LOSS, FEVER, CHILLS OR NEW CHANGES IN URINARY OR BOWEL CONTROL.

          THE PATIENT IS COVID-19 NEGATIVE

          DESCRIPTION OF PROCEDURE    THE PATIENT WAS BROUGHT TO THE

          PROCEDURE ROOM AND PLACED IN THE PRONE POSITION. A TIMEOUT WAS

          PERFORMED WHERE THE CONSENTED SITE WAS VERIFIED WITH EVERYONE IN

          THE ROOM. THE LUMBOSACRAL AREA WAS CLEANED WITH CHLORAPREP

          SOLUTION AND DRAPED ASEPTICALLY. THE PROCEDURE WAS DONE UNDER

          STERILE CONDITIONS. UNDER FLUOROSCOPIC GUIDANCE, TARGETS WERE

          SELECTED AT THE INTERSECTION OF THE RIGHT TRANSVERSE PROCESS OF

          L4, L5 AND ALA OF S1 WITH ITS RESPECTIVE SUPERIOR ARTICULAR

          PROCESS. I CONFIRMED AGAIN THE SITE OF TARGET. LIDOCAINE WAS USED

          TO NUMB THE SKIN AND THE SUBCUTANEOUS TISSUE BELOW IT.

          RADIOFREQUENCY CANNULAS, 17-GAUGE, 150 MM LONG WITH 4 MM ACTIVE

          TIP, WERE ADVANCED UNDER FLUOROSCOPIC GUIDANCE AND FOLLOWING

          PATIENT FEEDBACK UNTIL THE TARGET AREA WAS REACHED. POSITION OF

          THE CANNULA WAS VERIFIED WITH AP AND LATERAL VIEWS. AFTER PROPER

          POSITION OF THE CANNULA WAS ACHIEVED, WE WORKED WITH THE RIGHT

          SELECTED MEDIAN BRANCHES OF L3, L4 AND THE DORSAL RAMI OF L5. WE

          MEASURED THE CORRESPONDING IMPEDANCES AND MOTOR RESPONSES AS

          INDICATED IN THE RADIOFREQUENCY WORK SHEET. POSITION OF THE

          CANNULA WAS VERIFIED AGAIN WITH AP AND LATERAL VIEWS. LIDOCAINE

          1%, 2 ML, WAS INJECTED AT EACH LEVEL. RADIOFREQUENCY WAS DONE AT

          EACH LEVEL USING THE [employer] SYSTEM-- COOLED RF-- WITH A SETTING

          AT THE MACHINE OF 60 DEGREES WITH A TARGET TISSUE TEMPERATURE OF

          80 TO 90 DEGREES FOR A MINIMUM  SECONDS. AFTER

          RADIOFREQUENCY WAS DONE, THE PATIENT RECEIVED BUPIVACAINE

          0.125%,1 ML, WITH DEXAMETHASONE 3 MG AT EACH SITE. THERE WAS NO

          EVIDENCE OF BLOOD, PARESTHESIA OR CEREBROSPINAL FLUID DURING THE

          PROCEDURE. THE PATIENT WAS SENT TO THE RECOVERY ROOMS. THE

          PATIENT WAS MOVING THE EXTREMITIES AND DOING WELL. EBL LESS THAN

          5 ML. THERE WERE NO COMPLICATIONS DURING THE PROCEDURE.

          FLUOROSCOPY TIME WAS 2 MINUTE 21 SECONDS

          POST PROCEDURE NOTE    THE PATIENT WILL BE SEEN IN A FOLLOW UP IN

          THE NEXT FEW WEEKS. INSTRUCTIONS WERE GIVEN, QUESTIONS WERE

          ANSWERED, AND THE PATIENT EXPRESSED UNDERSTANDING AND AGREES WITH

          THE PLAN. . I, ESTER KNIGHT, DOCUMENTED THE ABOVE

          INFORMATION ACTING AS A SCRIBE FOR DR. CHILDS. I HAVE REVIEWED

          THE ABOVE DOCUMENT, WRITTEN BY DAMIAN CORTEZ, AND I

          VERIFY THAT IT IS ACCURATE

           

VISIT CODES

           

PROCEDURE CODES

           

          46945 DESTROY LUMB/SAC FACET JNT, MODIFIERS: RT

           

          82131 DESTROY L/S FACET JNT ADDL, MODIFIERS: RT

           

DISPOSITION & COMMUNICATION

           

FOLLOW UP

           

          FOLLOW UP WITH NP (REASON: POST RIGHT LUMBAR COOL RADIOFREQUENCY

          L4-L5, L5-S1)

           

 

ELECTRONICALLY SIGNED BY EYAD CHILDS MD, MD ON

          2021 AT 02:10 PM EDT

           

           

           

 

DISCLAIMER :

THIS IS A VISIT SUMMARY EXTRACTED FROM THE [employer] CHART.

IT IS NOT A COPY OF THE [employer] PROGRESS NOTE.

RAMÓN

## 2021-09-03 ENCOUNTER — HOSPITAL ENCOUNTER (OUTPATIENT)
Dept: HOSPITAL 53 - M PLALAB | Age: 31
End: 2021-09-03
Attending: NURSE PRACTITIONER
Payer: COMMERCIAL

## 2021-09-03 DIAGNOSIS — Z13.1: ICD-10-CM

## 2021-09-03 DIAGNOSIS — Z00.00: Primary | ICD-10-CM

## 2021-09-03 DIAGNOSIS — Z13.220: ICD-10-CM

## 2021-09-03 DIAGNOSIS — F41.8: ICD-10-CM

## 2021-09-03 LAB
ALBUMIN SERPL BCG-MCNC: 3.1 GM/DL (ref 3.2–5.2)
ALT SERPL W P-5'-P-CCNC: 25 U/L (ref 12–78)
BASOPHILS # BLD AUTO: 0 10^3/UL (ref 0–0.2)
BASOPHILS NFR BLD AUTO: 0.6 % (ref 0–1)
BILIRUB SERPL-MCNC: 0.2 MG/DL (ref 0.2–1)
BUN SERPL-MCNC: 8 MG/DL (ref 7–18)
CALCIUM SERPL-MCNC: 8.5 MG/DL (ref 8.5–10.1)
CHLORIDE SERPL-SCNC: 107 MEQ/L (ref 98–107)
CHOLEST SERPL-MCNC: 188 MG/DL (ref ?–200)
CHOLEST/HDLC SERPL: 6.06 {RATIO} (ref ?–5)
CO2 SERPL-SCNC: 25 MEQ/L (ref 21–32)
CREAT SERPL-MCNC: 0.59 MG/DL (ref 0.55–1.3)
EOSINOPHIL # BLD AUTO: 0.3 10^3/UL (ref 0–0.5)
EOSINOPHIL NFR BLD AUTO: 3.9 % (ref 0–3)
EST. AVERAGE GLUCOSE BLD GHB EST-MCNC: 131 MG/DL (ref 60–110)
GFR SERPL CREATININE-BSD FRML MDRD: > 60 ML/MIN/{1.73_M2} (ref 60–?)
GLUCOSE SERPL-MCNC: 91 MG/DL (ref 70–100)
HCT VFR BLD AUTO: 36.3 % (ref 36–47)
HDLC SERPL-MCNC: 31 MG/DL (ref 40–?)
HGB BLD-MCNC: 11.5 G/DL (ref 12–15.5)
LDLC SERPL CALC-MCNC: 126 MG/DL (ref ?–100)
LYMPHOCYTES # BLD AUTO: 2.5 10^3/UL (ref 1.5–5)
LYMPHOCYTES NFR BLD AUTO: 34.6 % (ref 24–44)
MCH RBC QN AUTO: 26.7 PG (ref 27–33)
MCHC RBC AUTO-ENTMCNC: 31.7 G/DL (ref 32–36.5)
MCV RBC AUTO: 84.2 FL (ref 80–96)
MONOCYTES # BLD AUTO: 0.4 10^3/UL (ref 0–0.8)
MONOCYTES NFR BLD AUTO: 6.1 % (ref 2–8)
NEUTROPHILS # BLD AUTO: 3.9 10^3/UL (ref 1.5–8.5)
NEUTROPHILS NFR BLD AUTO: 54.5 % (ref 36–66)
NONHDLC SERPL-MCNC: 157 MG/DL
PLATELET # BLD AUTO: 264 10^3/UL (ref 150–450)
POTASSIUM SERPL-SCNC: 4.4 MEQ/L (ref 3.5–5.1)
PROT SERPL-MCNC: 7.1 GM/DL (ref 6.4–8.2)
RBC # BLD AUTO: 4.31 10^6/UL (ref 4–5.4)
SODIUM SERPL-SCNC: 137 MEQ/L (ref 136–145)
T4 FREE SERPL-MCNC: 0.84 NG/DL (ref 0.76–1.46)
TRIGL SERPL-MCNC: 155 MG/DL (ref ?–150)
TSH SERPL DL<=0.005 MIU/L-ACNC: 1.86 UIU/ML (ref 0.36–3.74)
WBC # BLD AUTO: 7.2 10^3/UL (ref 4–10)

## 2021-09-15 ENCOUNTER — HOSPITAL ENCOUNTER (OUTPATIENT)
Dept: HOSPITAL 53 - M PAIN | Age: 31
End: 2021-09-15
Attending: ANESTHESIOLOGY
Payer: COMMERCIAL

## 2021-09-15 DIAGNOSIS — G89.29: Primary | ICD-10-CM

## 2021-09-15 DIAGNOSIS — F41.9: ICD-10-CM

## 2021-09-15 DIAGNOSIS — F32.9: ICD-10-CM

## 2021-09-15 DIAGNOSIS — M79.18: ICD-10-CM

## 2021-09-15 DIAGNOSIS — J45.909: ICD-10-CM

## 2021-09-15 DIAGNOSIS — F17.210: ICD-10-CM

## 2021-09-15 DIAGNOSIS — G47.00: ICD-10-CM

## 2021-09-15 DIAGNOSIS — M54.5: ICD-10-CM

## 2021-09-15 DIAGNOSIS — M47.816: ICD-10-CM

## 2021-09-24 ENCOUNTER — HOSPITAL ENCOUNTER (OUTPATIENT)
Dept: HOSPITAL 53 - M PAIN | Age: 31
End: 2021-09-24
Attending: ANESTHESIOLOGY
Payer: COMMERCIAL

## 2021-09-24 DIAGNOSIS — F17.210: ICD-10-CM

## 2021-09-24 DIAGNOSIS — J45.909: ICD-10-CM

## 2021-09-24 DIAGNOSIS — Z86.59: ICD-10-CM

## 2021-09-24 DIAGNOSIS — Z79.899: ICD-10-CM

## 2021-09-24 DIAGNOSIS — G89.29: ICD-10-CM

## 2021-09-24 DIAGNOSIS — M47.816: Primary | ICD-10-CM

## 2021-10-06 ENCOUNTER — HOSPITAL ENCOUNTER (OUTPATIENT)
Dept: HOSPITAL 53 - M PAIN | Age: 31
End: 2021-10-06
Attending: ANESTHESIOLOGY
Payer: COMMERCIAL

## 2021-10-06 DIAGNOSIS — E11.9: ICD-10-CM

## 2021-10-06 DIAGNOSIS — M54.50: ICD-10-CM

## 2021-10-06 DIAGNOSIS — Z79.84: ICD-10-CM

## 2021-10-06 DIAGNOSIS — F32.9: ICD-10-CM

## 2021-10-06 DIAGNOSIS — J45.909: ICD-10-CM

## 2021-10-06 DIAGNOSIS — M79.18: ICD-10-CM

## 2021-10-06 DIAGNOSIS — F41.9: ICD-10-CM

## 2021-10-06 DIAGNOSIS — Z87.891: ICD-10-CM

## 2021-10-06 DIAGNOSIS — Z79.899: ICD-10-CM

## 2021-10-06 DIAGNOSIS — G89.29: Primary | ICD-10-CM

## 2021-10-29 ENCOUNTER — HOSPITAL ENCOUNTER (OUTPATIENT)
Dept: HOSPITAL 53 - M RAD | Age: 31
End: 2021-10-29
Attending: NURSE PRACTITIONER
Payer: COMMERCIAL

## 2021-10-29 DIAGNOSIS — J45.40: Primary | ICD-10-CM

## 2021-10-29 NOTE — REP
INDICATION:

MODERATE PERSISTENT ASTHMA, UNCOMPLICATED.



COMPARISON:

01/25/2021 from an outside institution



FINDINGS:

The superior mediastinal structures are midline.  The cardiac silhouette is

unremarkable in size, shape, and position.  The diaphragmatic surfaces of the lungs

are regular, and the costophrenic angles are clear.  The pulmonary fields are clear.

The imaged osseous structures are intact.





IMPRESSION:

There is no acute cardiopulmonary disease.





<Electronically signed by Eliu King > 10/29/21 9001

## 2021-11-17 ENCOUNTER — HOSPITAL ENCOUNTER (OUTPATIENT)
Dept: HOSPITAL 53 - M PAIN | Age: 31
End: 2021-11-17
Attending: ANESTHESIOLOGY
Payer: COMMERCIAL

## 2021-11-17 DIAGNOSIS — Z79.84: ICD-10-CM

## 2021-11-17 DIAGNOSIS — Z87.891: ICD-10-CM

## 2021-11-17 DIAGNOSIS — Z79.899: ICD-10-CM

## 2021-11-17 DIAGNOSIS — M79.18: ICD-10-CM

## 2021-11-17 DIAGNOSIS — M54.50: Primary | ICD-10-CM

## 2021-11-17 DIAGNOSIS — E11.9: ICD-10-CM

## 2021-11-24 ENCOUNTER — HOSPITAL ENCOUNTER (OUTPATIENT)
Dept: HOSPITAL 53 - M PLALAB | Age: 31
End: 2021-11-24
Attending: NURSE PRACTITIONER
Payer: COMMERCIAL

## 2021-11-24 DIAGNOSIS — E78.5: ICD-10-CM

## 2021-11-24 DIAGNOSIS — R73.09: Primary | ICD-10-CM

## 2021-11-24 DIAGNOSIS — I10: ICD-10-CM

## 2021-11-24 LAB
ALBUMIN SERPL BCG-MCNC: 3.4 GM/DL (ref 3.2–5.2)
ALT SERPL W P-5'-P-CCNC: 17 U/L (ref 12–78)
BILIRUB SERPL-MCNC: 0.2 MG/DL (ref 0.2–1)
BUN SERPL-MCNC: 11 MG/DL (ref 7–18)
CALCIUM SERPL-MCNC: 9 MG/DL (ref 8.5–10.1)
CHLORIDE SERPL-SCNC: 107 MEQ/L (ref 98–107)
CHOLEST SERPL-MCNC: 209 MG/DL (ref ?–200)
CHOLEST/HDLC SERPL: 6.53 {RATIO} (ref ?–5)
CO2 SERPL-SCNC: 26 MEQ/L (ref 21–32)
CREAT SERPL-MCNC: 0.64 MG/DL (ref 0.55–1.3)
EST. AVERAGE GLUCOSE BLD GHB EST-MCNC: 128 MG/DL (ref 60–110)
GFR SERPL CREATININE-BSD FRML MDRD: > 60 ML/MIN/{1.73_M2} (ref 60–?)
GLUCOSE SERPL-MCNC: 110 MG/DL (ref 70–100)
HDLC SERPL-MCNC: 32 MG/DL (ref 40–?)
LDLC SERPL CALC-MCNC: 124 MG/DL (ref ?–100)
NONHDLC SERPL-MCNC: 177 MG/DL
POTASSIUM SERPL-SCNC: 4.1 MEQ/L (ref 3.5–5.1)
PROT SERPL-MCNC: 7.7 GM/DL (ref 6.4–8.2)
SODIUM SERPL-SCNC: 139 MEQ/L (ref 136–145)
TRIGL SERPL-MCNC: 265 MG/DL (ref ?–150)

## 2021-11-30 ENCOUNTER — HOSPITAL ENCOUNTER (OUTPATIENT)
Dept: HOSPITAL 53 - M PAIN | Age: 31
End: 2021-11-30
Attending: ANESTHESIOLOGY
Payer: COMMERCIAL

## 2021-11-30 DIAGNOSIS — J45.909: ICD-10-CM

## 2021-11-30 DIAGNOSIS — M51.16: Primary | ICD-10-CM

## 2021-11-30 DIAGNOSIS — Z79.899: ICD-10-CM

## 2021-11-30 DIAGNOSIS — F32.A: ICD-10-CM

## 2021-11-30 DIAGNOSIS — Z87.891: ICD-10-CM

## 2021-11-30 DIAGNOSIS — Z79.84: ICD-10-CM

## 2021-11-30 DIAGNOSIS — G47.00: ICD-10-CM

## 2021-11-30 DIAGNOSIS — M48.061: ICD-10-CM

## 2021-11-30 DIAGNOSIS — R73.03: ICD-10-CM

## 2021-11-30 DIAGNOSIS — F41.9: ICD-10-CM

## 2021-12-14 ENCOUNTER — HOSPITAL ENCOUNTER (OUTPATIENT)
Dept: HOSPITAL 53 - M PAIN | Age: 31
End: 2021-12-14
Attending: ANESTHESIOLOGY
Payer: COMMERCIAL

## 2021-12-14 DIAGNOSIS — J45.909: ICD-10-CM

## 2021-12-14 DIAGNOSIS — F32.A: ICD-10-CM

## 2021-12-14 DIAGNOSIS — M48.061: Primary | ICD-10-CM

## 2021-12-14 DIAGNOSIS — M51.16: ICD-10-CM

## 2021-12-14 DIAGNOSIS — F41.9: ICD-10-CM

## 2021-12-14 DIAGNOSIS — Z79.899: ICD-10-CM

## 2021-12-14 DIAGNOSIS — G47.00: ICD-10-CM

## 2021-12-14 DIAGNOSIS — Z87.891: ICD-10-CM

## 2021-12-23 ENCOUNTER — HOSPITAL ENCOUNTER (OUTPATIENT)
Dept: HOSPITAL 53 - M PT | Age: 31
LOS: 8 days | End: 2021-12-31
Attending: NURSE PRACTITIONER
Payer: COMMERCIAL

## 2021-12-23 DIAGNOSIS — M47.16: Primary | ICD-10-CM

## 2022-01-04 ENCOUNTER — HOSPITAL ENCOUNTER (OUTPATIENT)
Dept: HOSPITAL 53 - M PT | Age: 32
LOS: 27 days | End: 2022-01-31
Attending: NURSE PRACTITIONER
Payer: COMMERCIAL

## 2022-01-04 DIAGNOSIS — M47.16: Primary | ICD-10-CM

## 2022-01-21 ENCOUNTER — HOSPITAL ENCOUNTER (OUTPATIENT)
Dept: HOSPITAL 53 - M PAIN | Age: 32
End: 2022-01-21
Attending: ANESTHESIOLOGY
Payer: COMMERCIAL

## 2022-01-21 DIAGNOSIS — M48.061: Primary | ICD-10-CM

## 2022-01-21 DIAGNOSIS — M54.50: ICD-10-CM

## 2022-01-21 DIAGNOSIS — F41.9: ICD-10-CM

## 2022-01-21 DIAGNOSIS — F32.A: ICD-10-CM

## 2022-01-21 DIAGNOSIS — Z79.899: ICD-10-CM

## 2022-01-21 DIAGNOSIS — E11.9: ICD-10-CM

## 2022-01-21 DIAGNOSIS — Z79.84: ICD-10-CM

## 2022-01-21 DIAGNOSIS — J45.909: ICD-10-CM

## 2022-01-21 DIAGNOSIS — Z87.891: ICD-10-CM

## 2022-01-26 ENCOUNTER — HOSPITAL ENCOUNTER (OUTPATIENT)
Dept: HOSPITAL 53 - M LABSMTC | Age: 32
End: 2022-01-26
Attending: ANESTHESIOLOGY
Payer: COMMERCIAL

## 2022-01-26 DIAGNOSIS — Z11.52: ICD-10-CM

## 2022-01-26 DIAGNOSIS — Z01.818: Primary | ICD-10-CM

## 2022-01-31 ENCOUNTER — HOSPITAL ENCOUNTER (OUTPATIENT)
Dept: HOSPITAL 53 - M PAIN | Age: 32
End: 2022-01-31
Attending: ANESTHESIOLOGY
Payer: COMMERCIAL

## 2022-01-31 DIAGNOSIS — J45.909: ICD-10-CM

## 2022-01-31 DIAGNOSIS — G47.00: ICD-10-CM

## 2022-01-31 DIAGNOSIS — Z79.899: ICD-10-CM

## 2022-01-31 DIAGNOSIS — M51.17: Primary | ICD-10-CM

## 2022-01-31 DIAGNOSIS — F41.9: ICD-10-CM

## 2022-01-31 DIAGNOSIS — Z79.1: ICD-10-CM

## 2022-01-31 DIAGNOSIS — Z87.891: ICD-10-CM

## 2022-01-31 DIAGNOSIS — F32.A: ICD-10-CM

## 2022-01-31 PROCEDURE — 62323 NJX INTERLAMINAR LMBR/SAC: CPT

## 2022-02-10 ENCOUNTER — HOSPITAL ENCOUNTER (OUTPATIENT)
Dept: HOSPITAL 53 - M PLALAB | Age: 32
End: 2022-02-10
Attending: PHYSICIAN ASSISTANT
Payer: COMMERCIAL

## 2022-02-10 DIAGNOSIS — E11.9: ICD-10-CM

## 2022-02-10 DIAGNOSIS — R30.0: ICD-10-CM

## 2022-02-10 DIAGNOSIS — R10.11: Primary | ICD-10-CM

## 2022-02-10 DIAGNOSIS — E55.9: ICD-10-CM

## 2022-02-10 LAB
25(OH)D3 SERPL-MCNC: 21.5 NG/ML (ref 30–100)
ALBUMIN SERPL BCG-MCNC: 3.3 GM/DL (ref 3.2–5.2)
ALT SERPL W P-5'-P-CCNC: 16 U/L (ref 12–78)
AMYLASE SERPL-CCNC: 44 U/L (ref 25–115)
APPEARANCE UR: (no result)
BACTERIA UR QL AUTO: (no result)
BILIRUB SERPL-MCNC: 0.1 MG/DL (ref 0.2–1)
BILIRUB UR QL STRIP.AUTO: NEGATIVE
BUN SERPL-MCNC: 9 MG/DL (ref 7–18)
CALCIUM SERPL-MCNC: 9.5 MG/DL (ref 8.5–10.1)
CHLORIDE SERPL-SCNC: 105 MEQ/L (ref 98–107)
CHOLEST SERPL-MCNC: 175 MG/DL (ref ?–200)
CHOLEST/HDLC SERPL: 5.83 {RATIO} (ref ?–5)
CO2 SERPL-SCNC: 26 MEQ/L (ref 21–32)
CREAT SERPL-MCNC: 0.65 MG/DL (ref 0.55–1.3)
EST. AVERAGE GLUCOSE BLD GHB EST-MCNC: 128 MG/DL (ref 60–110)
GFR SERPL CREATININE-BSD FRML MDRD: > 60 ML/MIN/{1.73_M2} (ref 60–?)
GLUCOSE SERPL-MCNC: 108 MG/DL (ref 70–100)
GLUCOSE UR QL STRIP.AUTO: NEGATIVE MG/DL
HCT VFR BLD AUTO: 34.3 % (ref 36–47)
HDLC SERPL-MCNC: 30 MG/DL (ref 40–?)
HGB BLD-MCNC: 10.6 G/DL (ref 12–15.5)
HGB UR QL STRIP.AUTO: NEGATIVE
KETONES UR QL STRIP.AUTO: NEGATIVE MG/DL
LDLC SERPL CALC-MCNC: 120 MG/DL (ref ?–100)
LEUKOCYTE ESTERASE UR QL STRIP.AUTO: NEGATIVE
LIPASE SERPL-CCNC: 129 U/L (ref 73–393)
MCH RBC QN AUTO: 25.2 PG (ref 27–33)
MCHC RBC AUTO-ENTMCNC: 30.9 G/DL (ref 32–36.5)
MCV RBC AUTO: 81.5 FL (ref 80–96)
MUCOUS THREADS URNS QL MICRO: (no result)
NITRITE UR QL STRIP.AUTO: POSITIVE
NONHDLC SERPL-MCNC: 145 MG/DL
PH UR STRIP.AUTO: 5 UNITS (ref 5–9)
PLATELET # BLD AUTO: 299 10^3/UL (ref 150–450)
POTASSIUM SERPL-SCNC: 4.5 MEQ/L (ref 3.5–5.1)
PROT SERPL-MCNC: 7.4 GM/DL (ref 6.4–8.2)
PROT UR QL STRIP.AUTO: NEGATIVE MG/DL
RBC # BLD AUTO: 4.21 10^6/UL (ref 4–5.4)
RBC # UR AUTO: 1 /HPF (ref 0–3)
SODIUM SERPL-SCNC: 136 MEQ/L (ref 136–145)
SP GR UR STRIP.AUTO: 1.02 (ref 1–1.03)
SQUAMOUS #/AREA URNS AUTO: 29 /HPF (ref 0–6)
TRIGL SERPL-MCNC: 125 MG/DL (ref ?–150)
TSH SERPL DL<=0.005 MIU/L-ACNC: 1.76 UIU/ML (ref 0.36–3.74)
UROBILINOGEN UR QL STRIP.AUTO: 0.2 MG/DL (ref 0–2)
WBC # BLD AUTO: 7.8 10^3/UL (ref 4–10)
WBC #/AREA URNS AUTO: 7 /HPF (ref 0–3)

## 2022-04-19 ENCOUNTER — HOSPITAL ENCOUNTER (OUTPATIENT)
Dept: HOSPITAL 53 - M RAD | Age: 32
End: 2022-04-19
Attending: NURSE PRACTITIONER
Payer: COMMERCIAL

## 2022-04-19 DIAGNOSIS — M25.562: ICD-10-CM

## 2022-04-19 DIAGNOSIS — R91.8: Primary | ICD-10-CM

## 2022-04-19 DIAGNOSIS — M25.561: Primary | ICD-10-CM

## 2022-04-19 DIAGNOSIS — Z87.891: ICD-10-CM

## 2022-05-03 ENCOUNTER — HOSPITAL ENCOUNTER (OUTPATIENT)
Dept: HOSPITAL 53 - M WHC | Age: 32
End: 2022-05-03
Attending: PLASTIC SURGERY
Payer: COMMERCIAL

## 2022-05-03 DIAGNOSIS — N62: ICD-10-CM

## 2022-05-03 DIAGNOSIS — N63.14: ICD-10-CM

## 2022-05-03 DIAGNOSIS — N64.59: ICD-10-CM

## 2022-05-03 DIAGNOSIS — R92.8: Primary | ICD-10-CM

## 2022-05-03 PROCEDURE — 76642 ULTRASOUND BREAST LIMITED: CPT

## 2022-05-03 PROCEDURE — 77066 DX MAMMO INCL CAD BI: CPT

## 2022-05-04 ENCOUNTER — HOSPITAL ENCOUNTER (OUTPATIENT)
Dept: HOSPITAL 53 - M PAIN | Age: 32
End: 2022-05-04
Attending: FAMILY MEDICINE
Payer: COMMERCIAL

## 2022-05-04 DIAGNOSIS — Z79.899: ICD-10-CM

## 2022-05-04 DIAGNOSIS — G89.29: ICD-10-CM

## 2022-05-04 DIAGNOSIS — Z86.59: ICD-10-CM

## 2022-05-04 DIAGNOSIS — M51.16: Primary | ICD-10-CM

## 2022-05-04 DIAGNOSIS — Z87.891: ICD-10-CM

## 2022-05-04 DIAGNOSIS — J45.909: ICD-10-CM

## 2022-05-04 DIAGNOSIS — E11.9: ICD-10-CM

## 2022-05-09 ENCOUNTER — HOSPITAL ENCOUNTER (OUTPATIENT)
Dept: HOSPITAL 53 - M PLALAB | Age: 32
End: 2022-05-09
Attending: SURGERY
Payer: COMMERCIAL

## 2022-05-09 DIAGNOSIS — N64.52: Primary | ICD-10-CM

## 2022-05-09 LAB
PROLACTIN SERPL-MCNC: 3 NG/ML
T4 FREE SERPL-MCNC: 0.89 NG/DL (ref 0.76–1.46)
TSH SERPL DL<=0.005 MIU/L-ACNC: 1.8 UIU/ML (ref 0.36–3.74)

## 2022-06-09 ENCOUNTER — HOSPITAL ENCOUNTER (OUTPATIENT)
Dept: HOSPITAL 53 - M LABSMTC | Age: 32
End: 2022-06-09
Attending: ANESTHESIOLOGY
Payer: COMMERCIAL

## 2022-06-09 DIAGNOSIS — Z01.812: Primary | ICD-10-CM

## 2022-06-09 DIAGNOSIS — Z11.52: ICD-10-CM

## 2022-06-14 ENCOUNTER — HOSPITAL ENCOUNTER (OUTPATIENT)
Dept: HOSPITAL 53 - M SDC | Age: 32
Discharge: HOME | End: 2022-06-14
Attending: SURGERY
Payer: COMMERCIAL

## 2022-06-14 VITALS — HEIGHT: 64 IN | WEIGHT: 220 LBS | BODY MASS INDEX: 37.56 KG/M2

## 2022-06-14 VITALS — SYSTOLIC BLOOD PRESSURE: 129 MMHG | DIASTOLIC BLOOD PRESSURE: 57 MMHG

## 2022-06-14 DIAGNOSIS — K21.9: ICD-10-CM

## 2022-06-14 DIAGNOSIS — E66.9: ICD-10-CM

## 2022-06-14 DIAGNOSIS — Z87.891: ICD-10-CM

## 2022-06-14 DIAGNOSIS — G47.33: ICD-10-CM

## 2022-06-14 DIAGNOSIS — E11.9: ICD-10-CM

## 2022-06-14 DIAGNOSIS — J45.909: ICD-10-CM

## 2022-06-14 DIAGNOSIS — N61.1: Primary | ICD-10-CM

## 2022-06-14 LAB — B-HCG SERPL QL: NEGATIVE

## 2022-06-14 PROCEDURE — 84703 CHORIONIC GONADOTROPIN ASSAY: CPT

## 2022-06-14 PROCEDURE — 88307 TISSUE EXAM BY PATHOLOGIST: CPT

## 2022-06-14 PROCEDURE — 86901 BLOOD TYPING SEROLOGIC RH(D): CPT

## 2022-06-14 PROCEDURE — 36415 COLL VENOUS BLD VENIPUNCTURE: CPT

## 2022-06-14 PROCEDURE — 86850 RBC ANTIBODY SCREEN: CPT

## 2022-06-14 PROCEDURE — 19101 BIOPSY OF BREAST OPEN: CPT

## 2022-06-14 PROCEDURE — 93005 ELECTROCARDIOGRAM TRACING: CPT

## 2022-06-14 PROCEDURE — 86900 BLOOD TYPING SEROLOGIC ABO: CPT

## 2023-08-17 ENCOUNTER — HOSPITAL ENCOUNTER (OUTPATIENT)
Dept: HOSPITAL 53 - M SOG | Age: 33
End: 2023-08-17
Attending: ORTHOPAEDIC SURGERY
Payer: COMMERCIAL

## 2023-08-17 DIAGNOSIS — M25.561: Primary | ICD-10-CM

## 2024-08-13 ENCOUNTER — HOSPITAL ENCOUNTER (EMERGENCY)
Dept: HOSPITAL 53 - M ED | Age: 34
Discharge: LEFT BEFORE DISCHARGE | End: 2024-08-13
Payer: COMMERCIAL

## 2024-08-13 VITALS — HEIGHT: 64 IN | BODY MASS INDEX: 40.87 KG/M2 | WEIGHT: 239.42 LBS

## 2024-08-13 VITALS — TEMPERATURE: 97.8 F | OXYGEN SATURATION: 98 % | SYSTOLIC BLOOD PRESSURE: 138 MMHG | DIASTOLIC BLOOD PRESSURE: 89 MMHG

## 2024-08-13 DIAGNOSIS — F17.290: ICD-10-CM

## 2024-08-13 DIAGNOSIS — Z53.9: ICD-10-CM

## 2024-08-13 DIAGNOSIS — N93.9: Primary | ICD-10-CM

## 2024-08-13 DIAGNOSIS — F41.9: ICD-10-CM

## 2024-08-13 DIAGNOSIS — J45.909: ICD-10-CM

## 2024-08-13 DIAGNOSIS — Z79.899: ICD-10-CM

## 2024-08-13 DIAGNOSIS — Z79.84: ICD-10-CM

## 2024-08-13 DIAGNOSIS — Z79.51: ICD-10-CM

## 2024-08-13 DIAGNOSIS — F32.A: ICD-10-CM

## 2024-08-13 DIAGNOSIS — I10: ICD-10-CM

## 2024-08-13 DIAGNOSIS — Z91.09: ICD-10-CM

## 2024-08-13 DIAGNOSIS — K21.9: ICD-10-CM

## 2024-08-13 LAB
B-HCG SERPL QL: NEGATIVE
BASOPHILS # BLD AUTO: 0 10^3/UL (ref 0–0.2)
BASOPHILS NFR BLD AUTO: 0.4 % (ref 0–1)
BUN SERPL-MCNC: 11 MG/DL (ref 9–23)
CALCIUM SERPL-MCNC: 8.5 MG/DL (ref 8.5–10.1)
CHLORIDE SERPL-SCNC: 106 MMOL/L (ref 98–107)
CO2 SERPL-SCNC: 24 MMOL/L (ref 20–31)
CREAT SERPL-MCNC: 0.53 MG/DL (ref 0.55–1.3)
EOSINOPHIL # BLD AUTO: 0.3 10^3/UL (ref 0–0.5)
EOSINOPHIL NFR BLD AUTO: 3.9 % (ref 0–3)
GFR SERPL CREATININE-BSD FRML MDRD: > 60 ML/MIN/{1.73_M2} (ref 60–?)
GLUCOSE SERPL-MCNC: 120 MG/DL (ref 60–100)
HCT VFR BLD AUTO: 30.4 % (ref 36–47)
HGB BLD-MCNC: 10 G/DL (ref 12–15.5)
LYMPHOCYTES # BLD AUTO: 1.6 10^3/UL (ref 1.5–5)
LYMPHOCYTES NFR BLD AUTO: 20.2 % (ref 24–44)
MCH RBC QN AUTO: 27.4 PG (ref 27–33)
MCHC RBC AUTO-ENTMCNC: 32.9 G/DL (ref 32–36.5)
MCV RBC AUTO: 83.3 FL (ref 80–96)
MONOCYTES # BLD AUTO: 0.3 10^3/UL (ref 0–0.8)
MONOCYTES NFR BLD AUTO: 3.9 % (ref 2–8)
NEUTROPHILS # BLD AUTO: 5.8 10^3/UL (ref 1.5–8.5)
NEUTROPHILS NFR BLD AUTO: 71.2 % (ref 36–66)
PLATELET # BLD AUTO: 289 10^3/UL (ref 150–450)
POTASSIUM SERPL-SCNC: 4.1 MMOL/L (ref 3.5–5.1)
RBC # BLD AUTO: 3.65 10^6/UL (ref 4–5.4)
SODIUM SERPL-SCNC: 135 MMOL/L (ref 136–145)
WBC # BLD AUTO: 8.1 10^3/UL (ref 4–10)

## 2024-11-26 ENCOUNTER — HOSPITAL ENCOUNTER (INPATIENT)
Dept: HOSPITAL 53 - M ED | Age: 34
LOS: 2 days | Discharge: HOME | DRG: 754 | End: 2024-11-28
Attending: STUDENT IN AN ORGANIZED HEALTH CARE EDUCATION/TRAINING PROGRAM | Admitting: PSYCHIATRY & NEUROLOGY
Payer: MEDICAID

## 2024-11-26 VITALS — WEIGHT: 235.89 LBS | BODY MASS INDEX: 45.71 KG/M2 | HEIGHT: 60.3 IN

## 2024-11-26 DIAGNOSIS — F90.9: ICD-10-CM

## 2024-11-26 DIAGNOSIS — F32.A: Primary | ICD-10-CM

## 2024-11-26 DIAGNOSIS — Z56.0: ICD-10-CM

## 2024-11-26 DIAGNOSIS — Z62.810: ICD-10-CM

## 2024-11-26 DIAGNOSIS — Z79.899: ICD-10-CM

## 2024-11-26 DIAGNOSIS — F43.10: ICD-10-CM

## 2024-11-26 LAB
ALBUMIN SERPL BCG-MCNC: 3.1 G/DL (ref 3.2–5.2)
ALP SERPL-CCNC: 85 U/L (ref 35–104)
ALT SERPL W P-5'-P-CCNC: 20 U/L (ref 7–40)
AMPHETAMINES UR QL SCN: NEGATIVE
AST SERPL-CCNC: 21 U/L (ref ?–34)
B-HCG SERPL QL: NEGATIVE
BARBITURATES UR QL SCN: NEGATIVE
BENZODIAZ UR QL SCN: NEGATIVE
BILIRUB CONJ SERPL-MCNC: < 0.1 MG/DL (ref ?–0.4)
BILIRUB SERPL-MCNC: < 0.2 MG/DL (ref 0.3–1.2)
BUN SERPL-MCNC: 7 MG/DL (ref 9–23)
BZE UR QL SCN: NEGATIVE
CALCIUM SERPL-MCNC: 9.1 MG/DL (ref 8.5–10.1)
CANNABINOIDS UR QL SCN: NEGATIVE
CHLORIDE SERPL-SCNC: 108 MMOL/L (ref 98–107)
CO2 SERPL-SCNC: 24 MMOL/L (ref 20–31)
CREAT SERPL-MCNC: 0.47 MG/DL (ref 0.55–1.3)
ETHANOL SERPL-MCNC: 0 % (ref 0–0.01)
GFR SERPL CREATININE-BSD FRML MDRD: > 60 ML/MIN/{1.73_M2} (ref 60–?)
GLUCOSE SERPL-MCNC: 163 MG/DL (ref 60–100)
HCT VFR BLD AUTO: 30.4 % (ref 36–47)
HGB BLD-MCNC: 9.7 G/DL (ref 12–15.5)
MCH RBC QN AUTO: 25.5 PG (ref 27–33)
MCHC RBC AUTO-ENTMCNC: 31.9 G/DL (ref 32–36.5)
MCV RBC AUTO: 80 FL (ref 80–96)
METHADONE UR QL SCN: NEGATIVE
OPIATES UR QL SCN: NEGATIVE
PCP UR QL SCN: NEGATIVE
PLATELET # BLD AUTO: 316 10^3/UL (ref 150–450)
POTASSIUM SERPL-SCNC: 4.1 MMOL/L (ref 3.5–5.1)
PROT SERPL-MCNC: 7.3 G/DL (ref 5.7–8.2)
RBC # BLD AUTO: 3.8 10^6/UL (ref 4–5.4)
SALICYLATES SERPL-MCNC: < 3 MG/DL (ref ?–30)
SODIUM SERPL-SCNC: 137 MMOL/L (ref 136–145)
TSH SERPL DL<=0.005 MIU/L-ACNC: 2.06 UIU/ML (ref 0.55–4.78)
WBC # BLD AUTO: 5.7 10^3/UL (ref 4–10)

## 2024-11-26 SDOH — ECONOMIC STABILITY - INCOME SECURITY: UNEMPLOYMENT, UNSPECIFIED: Z56.0

## 2024-11-27 VITALS — DIASTOLIC BLOOD PRESSURE: 88 MMHG | SYSTOLIC BLOOD PRESSURE: 127 MMHG | OXYGEN SATURATION: 99 % | TEMPERATURE: 98.4 F

## 2024-11-27 VITALS — TEMPERATURE: 97.9 F | OXYGEN SATURATION: 99 % | DIASTOLIC BLOOD PRESSURE: 65 MMHG | SYSTOLIC BLOOD PRESSURE: 141 MMHG

## 2024-11-27 LAB
CHOLEST SERPL-MCNC: 189 MG/DL (ref ?–200)
CHOLEST/HDLC SERPL: 5.96 {RATIO} (ref ?–5)
EST. AVERAGE GLUCOSE BLD GHB EST-MCNC: 151 MG/DL (ref 60–110)
HDLC SERPL-MCNC: 31.7 MG/DL (ref 40–?)
LDLC SERPL CALC-MCNC: 109.3 MG/DL (ref ?–100)
NONHDLC SERPL-MCNC: 157.3 MG/DL
TRIGL SERPL-MCNC: 240 MG/DL (ref ?–150)

## 2024-11-27 RX ADMIN — DEXTROAMPHETAMINE SACCHARATE, AMPHETAMINE ASPARTATE MONOHYDRATE, DEXTROAMPHETAMINE SULFATE, AMPHETAMINE SULFATE SCH MG: 1.25; 1.25; 1.25; 1.25 CAPSULE, EXTENDED RELEASE ORAL at 09:32

## 2024-11-27 RX ADMIN — INSULIN LISPRO SCH UNITS: 100 INJECTION, SOLUTION INTRAVENOUS; SUBCUTANEOUS at 20:27

## 2024-11-27 RX ADMIN — INSULIN LISPRO SCH UNITS: 100 INJECTION, SOLUTION INTRAVENOUS; SUBCUTANEOUS at 17:19

## 2024-11-28 VITALS — DIASTOLIC BLOOD PRESSURE: 58 MMHG | OXYGEN SATURATION: 97 % | TEMPERATURE: 97.4 F | SYSTOLIC BLOOD PRESSURE: 120 MMHG

## 2024-11-28 VITALS — SYSTOLIC BLOOD PRESSURE: 120 MMHG | DIASTOLIC BLOOD PRESSURE: 58 MMHG

## 2024-11-28 RX ADMIN — LISINOPRIL SCH MG: 2.5 TABLET ORAL at 09:34

## 2024-11-28 RX ADMIN — METFORMIN HYDROCHLORIDE SCH MG: 500 TABLET ORAL at 09:33

## 2025-01-20 ENCOUNTER — HOSPITAL ENCOUNTER (OUTPATIENT)
Dept: HOSPITAL 53 - M LAB REF | Age: 35
End: 2025-01-20
Payer: MEDICAID

## 2025-01-20 DIAGNOSIS — E11.8: Primary | ICD-10-CM

## 2025-01-20 DIAGNOSIS — E66.3: ICD-10-CM

## 2025-01-20 DIAGNOSIS — I10: ICD-10-CM

## 2025-01-20 DIAGNOSIS — Z11.9: ICD-10-CM

## 2025-01-20 LAB
BUN SERPL-MCNC: 8 MG/DL (ref 9–23)
CALCIUM SERPL-MCNC: 9 MG/DL (ref 8.5–10.1)
CHLORIDE SERPL-SCNC: 103 MMOL/L (ref 98–107)
CHOLEST SERPL-MCNC: 194 MG/DL (ref ?–200)
CHOLEST/HDLC SERPL: 5.44 {RATIO} (ref ?–5)
CO2 SERPL-SCNC: 25 MMOL/L (ref 20–31)
CREAT SERPL-MCNC: 0.55 MG/DL (ref 0.55–1.3)
CREAT UR-MCNC: 129.9 MG/DL
EST. AVERAGE GLUCOSE BLD GHB EST-MCNC: 160 MG/DL (ref 60–110)
GFR SERPL CREATININE-BSD FRML MDRD: > 60 ML/MIN/{1.73_M2} (ref 60–?)
GLUCOSE SERPL-MCNC: 118 MG/DL (ref 60–100)
HDLC SERPL-MCNC: 35.6 MG/DL (ref 40–?)
LDLC SERPL CALC-MCNC: 109.4 MG/DL (ref ?–100)
MICROALBUMIN UR-MCNC: 47 MG/L
MICROALBUMIN/CREAT UR: 36.1 MCG/MG (ref 0–30)
NONHDLC SERPL-MCNC: 158.4 MG/DL
POTASSIUM SERPL-SCNC: 4.4 MMOL/L (ref 3.5–5.1)
SODIUM SERPL-SCNC: 136 MMOL/L (ref 136–145)
TRIGL SERPL-MCNC: 245 MG/DL (ref ?–150)

## 2025-07-23 ENCOUNTER — HOSPITAL ENCOUNTER (OUTPATIENT)
Dept: HOSPITAL 53 - M LAB REF | Age: 35
End: 2025-07-23
Attending: PHYSICIAN ASSISTANT
Payer: COMMERCIAL

## 2025-07-23 DIAGNOSIS — N39.0: Primary | ICD-10-CM

## 2025-07-23 LAB
AMORPH SED URNS QL MICRO: (no result)
AMORPH URATE CRY URNS QL MICRO: (no result)
APPEARANCE UR: (no result)
BACTERIA UR QL AUTO: (no result)
BILIRUB UR QL STRIP.AUTO: NEGATIVE
CAOX CRY URNS QL MICRO: (no result)
COLOR UR AUTO: YELLOW
GLUCOSE UR QL STRIP.AUTO: NEGATIVE MG/DL
GRAN CASTS URNS QL MICRO: (no result) /LPF
HGB UR QL STRIP.AUTO: (no result)
KETONES UR QL STRIP.AUTO: NEGATIVE MG/DL
LEUKOCYTE ESTERASE UR QL STRIP.AUTO: (no result)
MUCOUS THREADS URNS QL MICRO: (no result)
NITRITE UR QL STRIP.AUTO: POSITIVE
PH UR STRIP.AUTO: 6 UNITS (ref 5–9)
PROT UR QL STRIP.AUTO: (no result) MG/DL
RBC # UR AUTO: 14 /HPF (ref 0–3)
RENAL EPI CELLS #/AREA URNS HPF: (no result) /HPF
SP GR UR STRIP.AUTO: 1.01 (ref 1–1.03)
SQUAMOUS #/AREA URNS AUTO: 9 /HPF (ref 0–6)
TRANS CELLS #/AREA URNS HPF: (no result) /HPF
TRI-PHOS CRY URNS QL MICRO: (no result)
UROBILINOGEN UR QL STRIP.AUTO: 0.2 MG/DL (ref 0–2)
WAXY CASTS #/AREA UR COMP ASSIST: (no result) /LPF
WBC #/AREA URNS AUTO: (no result) /HPF (ref 0–3)
WBC #/AREA URNS AUTO: (no result) /HPF (ref ?–1)
YEAST UR QL AUTO: (no result)